# Patient Record
Sex: FEMALE | Race: BLACK OR AFRICAN AMERICAN
[De-identification: names, ages, dates, MRNs, and addresses within clinical notes are randomized per-mention and may not be internally consistent; named-entity substitution may affect disease eponyms.]

---

## 2017-10-05 LAB
ANION GAP SERPL CALC-SCNC: 10 MMOL/L (ref 5–15)
APPEARANCE UR: CLEAR
APTT BLD: 31 SEC (ref 23–33)
B-HCG SERPL QL: NEGATIVE
BACTERIA #/AREA URNS HPF: (no result) /HPF
BASOPHILS NFR BLD AUTO: 1.3 % (ref 0–2)
CALCIUM SERPL-MCNC: 9.6 MG/DL (ref 8.6–10.2)
CHLORIDE SERPL-SCNC: 101 MEQ/L (ref 98–107)
CO2 SERPL-SCNC: 27 MEQ/L (ref 20–30)
CREAT SERPL-MCNC: 0.8 MG/DL (ref 0.5–0.9)
EOSINOPHIL NFR BLD AUTO: 1.5 % (ref 0–3)
ERYTHROCYTE [DISTWIDTH] IN BLOOD BY AUTOMATED COUNT: 13.3 % (ref 11.6–14.8)
GFR SERPLBLD BASED ON 1.73 SQ M-ARVRAT: > 60 ML/MIN (ref 60–?)
HEMOLYSIS: 1
INR PPP: 1.1 (ref 0.9–1.1)
KETONES UR QL STRIP: NEGATIVE
LEUKOCYTE ESTERASE UR QL STRIP: NEGATIVE
LYMPHOCYTES NFR BLD AUTO: 31.1 % (ref 20–45)
MCH RBC QN AUTO: 31 PG (ref 27–31)
MCHC RBC AUTO-ENTMCNC: 32.7 G/DL (ref 32–36)
MCV RBC AUTO: 95 FL (ref 80–99)
MONOCYTES NFR BLD AUTO: 6.2 % (ref 1–10)
NEUTROPHILS NFR BLD AUTO: 60 % (ref 45–75)
NITRITE UR QL STRIP: NEGATIVE
PH UR STRIP: 6.5 [PH] (ref 4.5–8)
PLATELET # BLD: 251 K/UL (ref 150–450)
PMV BLD AUTO: 9.4 FL (ref 6.5–10.1)
POTASSIUM SERPL-SCNC: 4 MEQ/L (ref 3.4–4.9)
PROT UR QL STRIP: NEGATIVE
PROTHROMBIN TIME: 11.6 SEC (ref 9.3–11.5)
RBC # BLD AUTO: 4.09 M/UL (ref 4.2–5.4)
RBC #/AREA URNS HPF: (no result) /HPF (ref 0–2)
SODIUM SERPL-SCNC: 138 MEQ/L (ref 135–145)
SP GR UR STRIP: 1 (ref 1–1.03)
SQUAMOUS #/AREA URNS LPF: (no result) /LPF
UROBILINOGEN UR-MCNC: NORMAL MG/DL (ref 0–1)
WBC # BLD AUTO: 11.2 K/UL (ref 4.8–10.8)
WBC #/AREA URNS HPF: (no result) /HPF (ref 0–2)

## 2017-10-05 NOTE — DIAGNOSTIC IMAGING REPORT
Indication: Cough



Comparison:  None



2 views of the chest obtained.



Findings: Cardiomediastinal silhouette and pulmonary vascularity are within normal

limits for age. The diaphragmatic contour is smooth and costophrenic angles are

sharp. No pleural effusions are identified. The bones are unremarkable.



Impression: No acute disease

## 2017-10-11 NOTE — PRE-OP HX & PHY REPO 2 SIG
DATE OF ADMISSION:  10/12/2017



SCHEDULED FOR SURGERY:  10/12/2017



HISTORY OF PRESENT ILLNESS:  The patient is a 32-year-old 

female in overall good health with a recently noted right breast mass,

biopsy-proven invasive ductal carcinoma.  Her mother has a history of

breast cancer.  Core needle biopsies of the mass in the right upper breast

revealed both invasive ductal carcinoma and ductal carcinoma in situ.  A

mass at 3 o'clock in the right breast needle biopsy revealed sclerosing

adenosis and axillary lymph node needle biopsy was negative for metastatic

carcinoma.  The patient is scheduled to undergo right breast partial

mastectomy and right axillary lymph node biopsy.



PAST MEDICAL HISTORY:  None.



MEDICATIONS:  None.



ALLERGIES:  None.



OPERATIONS:   x2.  She is para 2,  2.



PHYSICAL EXAMINATION:

GENERAL:  The patient is well developed and well nourished.

HEENT:  Within normal limits.

LUNGS:  Clear.

HEART:  Regular rhythm.

BREASTS:  Examination of the breasts reveals left breast is unremarkable.

In the right breast, there is a 4 x 5 cm palpable mass in the right upper

breast between 11 and 2 o'clock at the site of the invasive carcinoma and

DCIS.  There is nodularity at 3 o'clock in the medial breast, which is

benign on needle biopsy.  There is no palpable axillary or supraclavicular

lymphadenopathy.

ABDOMEN:  Negative by primary care physician recently.

PELVIC:  Negative by primary care physician recently.

RECTAL:  Negative by primary care physician recently.

EXTREMITIES:  Without edema.

NEUROLOGIC:  Physiologic.



IMPRESSION:  Invasive ductal carcinoma of the right breast with associated

ductal carcinoma in situ.



PLAN:  The patient had receptor studies revealing that her HER-2/ros status

is negative.  I have had a full discussion with the patient regarding the

nature of her condition, the medical and surgical treatment options and

the planned surgery including right breast partial mastectomy with right

axillary lymph node biopsy.  I have discussed the risks including

bleeding, infection, need for additional surgery based on final pathology,

possible need for chemotherapy if lymph nodes are positive and need

for radiation to the remaining breast.  I have also discussed

postoperative changes that can occur, including scarring and alteration of

the contour or appearance of the breast, etc.  The patient understands and

agrees to proceed.









  ______________________________________________

  Sumit Herrera M.D.





DR:  DS/PM

D:  10/11/2017 10:44

T:  10/11/2017 13:08

JOB#:  [id number]

CC:



KOFFI

## 2017-10-12 ENCOUNTER — HOSPITAL ENCOUNTER (INPATIENT)
Dept: HOSPITAL 72 - SUR | Age: 32
LOS: 1 days | Discharge: HOME | DRG: 363 | End: 2017-10-13
Payer: MEDICAID

## 2017-10-12 VITALS — DIASTOLIC BLOOD PRESSURE: 93 MMHG | SYSTOLIC BLOOD PRESSURE: 136 MMHG

## 2017-10-12 VITALS — SYSTOLIC BLOOD PRESSURE: 136 MMHG | DIASTOLIC BLOOD PRESSURE: 94 MMHG

## 2017-10-12 VITALS — SYSTOLIC BLOOD PRESSURE: 139 MMHG | DIASTOLIC BLOOD PRESSURE: 85 MMHG

## 2017-10-12 VITALS — HEIGHT: 67 IN | BODY MASS INDEX: 31.39 KG/M2 | WEIGHT: 200 LBS

## 2017-10-12 VITALS — DIASTOLIC BLOOD PRESSURE: 84 MMHG | SYSTOLIC BLOOD PRESSURE: 134 MMHG

## 2017-10-12 VITALS — SYSTOLIC BLOOD PRESSURE: 148 MMHG | DIASTOLIC BLOOD PRESSURE: 67 MMHG

## 2017-10-12 VITALS — SYSTOLIC BLOOD PRESSURE: 142 MMHG | DIASTOLIC BLOOD PRESSURE: 88 MMHG

## 2017-10-12 VITALS — SYSTOLIC BLOOD PRESSURE: 116 MMHG | DIASTOLIC BLOOD PRESSURE: 86 MMHG

## 2017-10-12 DIAGNOSIS — D05.11: Primary | ICD-10-CM

## 2017-10-12 DIAGNOSIS — Z80.3: ICD-10-CM

## 2017-10-12 DIAGNOSIS — Z23: ICD-10-CM

## 2017-10-12 PROCEDURE — 85610 PROTHROMBIN TIME: CPT

## 2017-10-12 PROCEDURE — 71020: CPT

## 2017-10-12 PROCEDURE — 94003 VENT MGMT INPAT SUBQ DAY: CPT

## 2017-10-12 PROCEDURE — 84703 CHORIONIC GONADOTROPIN ASSAY: CPT

## 2017-10-12 PROCEDURE — 0HBT0ZZ EXCISION OF RIGHT BREAST, OPEN APPROACH: ICD-10-PCS

## 2017-10-12 PROCEDURE — 85730 THROMBOPLASTIN TIME PARTIAL: CPT

## 2017-10-12 PROCEDURE — 07B50ZX EXCISION OF RIGHT AXILLARY LYMPHATIC, OPEN APPROACH, DIAGNOSTIC: ICD-10-PCS

## 2017-10-12 PROCEDURE — 80048 BASIC METABOLIC PNL TOTAL CA: CPT

## 2017-10-12 PROCEDURE — 81003 URINALYSIS AUTO W/O SCOPE: CPT

## 2017-10-12 PROCEDURE — 81025 URINE PREGNANCY TEST: CPT

## 2017-10-12 PROCEDURE — 90630: CPT

## 2017-10-12 PROCEDURE — 36415 COLL VENOUS BLD VENIPUNCTURE: CPT

## 2017-10-12 PROCEDURE — 85025 COMPLETE CBC W/AUTO DIFF WBC: CPT

## 2017-10-12 PROCEDURE — 94150 VITAL CAPACITY TEST: CPT

## 2017-10-12 RX ADMIN — DIPHENHYDRAMINE HYDROCHLORIDE PRN MG: 50 INJECTION INTRAMUSCULAR; INTRAVENOUS at 18:50

## 2017-10-12 NOTE — IMMEDIATE POST-OP EVALUATION
Immediate Post-Op Evalulation


Immediate Post-Op Evalulation


Procedure:  R breast partial mastectomy and axillary l/n dissection


Date of Evaluation:  Oct 12, 2017


Time of Evaluation:  14:35


IV Fluids:  1200


Blood Products:  none


Estimated Blood Loss:  100


Urinary Output:  none


Blood Pressure Systolic:  136


Blood Pressure Diastolic:  87


Pulse Rate:  89


Respiratory Rate:  22


O2 Sat by Pulse Oximetry:  99


Temperature (Fahrenheit):  97.8


Pain Score (1-10):  2


Nausea:  No


Vomiting:  No


Complications


none


Patient Status:  reacts, patent, extubated, none


Hydration Status:  adequate











OPAL WALTON M.D. Oct 12, 2017 14:36

## 2017-10-12 NOTE — ANETHESIA PREOPERATIVE EVAL
Anesthesia Pre-op PMH/ROS


General


Date of Evaluation:  Oct 12, 2017


Time of Evaluation:  11:59


Anesthesiologist:  Misty


ASA Score:  ASA 2


Mallampati Score


Class I : Soft palate, uvula, fauces, pillars visible


Class II: Soft palate, uvula, fauces visible


Class III: Soft palate, base of uvula visible


Class IV: Only hard plate visible


Mallampati Classification:  Class II


Surgeon:  Sharon


Diagnosis:  R breast CA


Surgical Procedure:  R breast partial mastectomy and axillary l/n dissection


Anesthesia History:  none


Family History:  no anesthesia problems


Allergies:  


Coded Allergies:  


     No Known Allergies (Unverified , 10/12/17)


Medications:  see eMAR





Past Medical History


Cardiovascular:  Denies: HTN, CAD, MI, valve dz, arrhythmia, other


Pulmonary:  Denies: asthma, COPD, DOMING[place], other


Gastrointestinal/Genitourinary:  Reports: GERD - mild, 


   Denies: CRI, ESRD, other


Neurologic/Psychiatric:  Denies: dementia, CVA, depression/anxiety, TIA, other


Endocrine:  Denies: DM, hypothyroidism, steroids, other


HEENT:  Denies: cataract (L), cataract (R), glaucoma, [place] (L), [place] (R), other


Hematology/Immune:  Denies: anemia, DVT, bleeding disorder, other


Musculoskeletal/Integumentary:  Denies: OA, RA, DJD, DDD, edema, other


Other:  other - overweight


PMH Narrative:


as above


PSxH Narrative:


C section x 3





Anesthesia Pre-op Phys. Exam


Physician Exam





Last Vital Signs








  Date Time  Temp Pulse Resp B/P (MAP) Pulse Ox O2 Delivery O2 Flow Rate FiO2


 


10/12/17 10:20 97.7 71 17 116/86 98 Room Air  








Constitutional:  NAD


Neurologic:  CN 2-12 intact


Cardiovascular:  RRR, no M/R/G


Respiratory:  CTA


Gastrointestinal:  S/NT/ND





Airway Exam


Mallampati Score:  Class II


MO:  full


Neck:  flexible


ROM:  full


Teeth:  intact


Dentures:  no upper, no lower





Anesthesia Pre-op A/P


Labs


see chart


Urine Pregnancy Test











Test


  10/12/17


09:55


 


Urine HCG, Qualitative Negative  











Risk Assessment & Plan


Assessment:


ASA 2


Plan:


GA with ETT ponv prevention


Status Change Before Surgery:  No





Pre-Antibiotics


Drug:  Ancef 2 gr.


Given Within 1 Hr of Incision:  Yes


Time Given:  12:28











OPAL WALTON M.D. Oct 12, 2017 12:55

## 2017-10-12 NOTE — BRIEF OPERATIVE NOTE
Immediate Post Operative Note


Operative Note


Pre-op Diagnosis:


invasive ductal carcinoma right breast


Procedure:


right breast partial mastectomy and right axillary lymph node biopsy


Post-op Diagnosis:


same


Post-op Diagnosis:  same as pre-op


Findings:  consistent w/pre-op dx studies


Surgeon:  venice


Anesthesiologist:  rolf


Anesthesia:  general


Specimen:  yes - breast mass and enlarged axillary nodes


Complications:  none


Condition:  stable


Fluids:  see anesthesia record


Drains:  MARINA


Implant(s) used?:  No











DAVION YOU Oct 12, 2017 14:30

## 2017-10-12 NOTE — OPERATIVE NOTE - DICTATED
DATE OF OPERATION:  10/12/2017



SURGEON:  Sumit Herrera M.D.



ASSISTANT:  None.



ANESTHESIOLOGIST:  Fabian Jay M.D.



TYPE OF ANESTHESIA:  General by LMA.



PREOPERATIVE DIAGNOSIS:  Infiltrating ductal carcinoma of the right breast



POSTOPERATIVE DIAGNOSIS:  Infiltrating ductal carcinoma of the right breast



OPERATION PERFORMED:  Right breast partial mastectomy with right axillary

lymph node biopsy.



DESCRIPTION OF PROCEDURE:  The patient was taken to the operating room and

under general anesthesia with sequential compression device stockings in

place and having received intravenous antibiotics, she was prepped and

draped in the usual fashion including the right upper extremity in the field

covered by an impervious stockinette.  The mass was located in the upper

midline of the breast.  A transverse curvilinear incision was made

achieving hemostasis with cautery.  Circumferential flaps were dissected.

Tissue was dissected down to the chest wall and included the pectoralis

fascia as the deep margin.  A wide excision partial mastectomy was

performed with the specimen oriented with 3 different suture markers

anterior, superior, and medial.  The specimen was taken off the field and

inspected by pathology who felt that the margins were clear.  The field

was irrigated and hemostasis was achieved with cautery.  The incision was

closed with interrupted inverted 3-0 Vicryl subcutaneous deep dermal

sutures followed by continuous 4-0 Monocryl subcuticular suture.  Then,

attention was directed to the right axilla where a transverse curvilinear

right axillary incision was made achieving hemostasis with cautery and

incising in the clavipectoral fascia.  Initial dissection failed to reveal

any suspicious lymph nodes but there were 2 very large Anaya's nodes,

which were resected with the axillary dissection.  Hemostasis was achieved

with cautery, medium Hemoclips, and 2-0 Vicryl ties as appropriate.  The

specimen was given off the field for pathology.  The field was copiously

irrigated and hemostasis was secure.  The axillary vein, thoracodorsal

vessels and nerves were preserved.  Through a separate stab incision

inferior, a 19 mm round Gume-Nelson was placed into the axilla and

sutured to the skin with 2-0 silk.  After ascertaining that hemostasis was

secure, the clavipectoral fascia was closed with interrupted 3-0 Vicryl,

subcutaneous tissues closed with interrupted 3-0 Vicryl, and skin closed

with continuous 4-0 Monocryl subcuticular suture.  Tincture of benzoin and

half-inch Steri-Strips were applied to both sites followed by dry sterile

dressings.  Final sponge and needle counts were correct.  A post-breast

surgery vest was applied over the dressing.  The patient tolerated the

procedure well and left the operating room in good condition.









  ______________________________________________

  Sumit Herrera M.D.





DR:  LEONARD

D:  10/12/2017 15:18

T:  10/12/2017 21:41

JOB#:  [id number]

CC:



KOFFI

## 2017-10-12 NOTE — PRE-PROCEDURE NOTE/ATTESTATION
Pre-Procedure Note/Attestation


Complete Prior to Procedure


Planned Procedure:  right


Procedure Narrative:


right breast partial mastectomy and right axillary lymph node biopsy





Indications for Procedure


Pre-Operative Diagnosis:


invasive ductal carcinoma right breast





Attestation


I attest that I discussed the nature of the procedure; its benefits; risks and 

complications; and alternatives (and the risks and benefits of such alternatives

), prior to the procedure, with the patient (or the patient's legal 

representative).





I attest that, if there was a reasonable possibility of needing a blood 

transfusion, the patient (or the patient's legal representative) was given the 

[facility] standardized written summary, pursuant 

to the Florin Thoreau Blood Safety Act ([place] Health and Safety Code # 1645, as 

amended).





I attest that I re-evaluated the patient just prior to the surgery and that 

there has been no change in the patient's H&P, except as documented below:none











DAVION YOU Oct 12, 2017 10:59

## 2017-10-13 VITALS — DIASTOLIC BLOOD PRESSURE: 78 MMHG | SYSTOLIC BLOOD PRESSURE: 130 MMHG

## 2017-10-13 VITALS — DIASTOLIC BLOOD PRESSURE: 83 MMHG | SYSTOLIC BLOOD PRESSURE: 138 MMHG

## 2017-10-13 VITALS — SYSTOLIC BLOOD PRESSURE: 116 MMHG | DIASTOLIC BLOOD PRESSURE: 75 MMHG

## 2017-10-13 VITALS — SYSTOLIC BLOOD PRESSURE: 126 MMHG | DIASTOLIC BLOOD PRESSURE: 83 MMHG

## 2017-10-13 RX ADMIN — DIPHENHYDRAMINE HYDROCHLORIDE PRN MG: 50 INJECTION INTRAMUSCULAR; INTRAVENOUS at 05:38

## 2017-10-13 RX ADMIN — DIPHENHYDRAMINE HYDROCHLORIDE PRN MG: 50 INJECTION INTRAMUSCULAR; INTRAVENOUS at 10:31

## 2017-10-13 NOTE — 48 HOUR POST ANESTHESIA EVAL
Post Anesthesia Evaluation


Procedure:  R breast partial mastectomy and axillary l/n dissection


Date of Evaluation:  Oct 13, 2017


Time of Evaluation:  16:04


Blood Pressure Systolic:  116


0:  75


Pulse Rate:  68


Respiratory Rate:  20


Temperature (Fahrenheit):  97.6


O2 Sat by Pulse Oximetry:  98


Airway:  patent


Nausea:  No


Vomiting:  No


Pain Intensity:  2


Hydration Status:  adequate


Cardiopulmonary Status:


stable


Mental Status/LOC:  patient returned to baseline


Follow-up Care/Observations:


n/a


Post-Anesthesia Complications:


none


Follow-up care needed:  ready to discharge











OPAL WALTON M.D. Oct 13, 2017 16:05

## 2017-10-13 NOTE — GENERAL PROGRESS NOTE
Progress Note


Progress Note


AVSS  Required Dilaudid SQ overnight for pain relief. Better this AM.  Emotional

/crying at times


Breast and axillary incisions are clean with intact steristrips and no 

ecchymosis or swelling


MARINA 25cc serosang


Imp. doing well


Plan; discharge 


         teach re care of MARINA drain


        instructions/limitations discussed and supplies provided


        Rx Norco 5/325 #20


        F/U in [facility] on 10/20 - call DAVION Marshall Oct 13, 2017 09:09

## 2017-10-17 NOTE — DISCHARGE SUMMARY
Discharge Summary


Hospital Course


Date of Admission


Oct 12, 2017 at 15:21


Date of Discharge


Oct 13, 2017 at 11:30


Admitting Diagnosis





Invasive ductal carcinoma of the right breast with associated ductal carcinoma 

in situ.


RENETTA Varela is a 32 year old female who was admitted on Oct 12, 2017 at 15:

21 for invasive ductal carcinoma of the right breast with associated


ductal carcinoma in situ.Patient was admitted for surgery


Procedures


s/p 10/12/17 by dr Herrera


 


Right breast partial mastectomy with right axillary lymph node biopsy.


Hospital Course


s/p surgery


pain management, 


breast and axillary incisions  clean with intact steri-strips , no ecchymosis 

or swelling 


MARINA drain output closely monitored 


patient was taught care of MARINA drain  


pain addressed and controlled  


ambulated, tolerated diet, voided 


instructions/limitations/supplies proved


scripts for analgesics provided 


fup with surgeon as outpatient as advised


 

















FINAL DIAGNOSIS


Invasive ductal carcinoma of the right breast with associated ductal carcinoma 

in situ.


s/p Right breast partial mastectomy with right axillary lymph node biopsy.





Discharge Medications


Continued Medications:  


Hydrocodone/Acetaminophen 5-325* (Hydrocodone/Acetaminophen 5-325*) 1 Each 

Tablet


1 TAB ORAL Q4H PRN for For Pain, #20 TAB 0 Refills











Discharge


Condition Upon Discharge:  stable


Discharge Disposition


Patient was discharged to Home (01)


Discharge Diagnoses:  





Discharge Instructions


Discharge Instructions


Special Instructions


I have been assigned to complete a D/C Summary on this account. I was not 

involved in the patient management











Perla Bright NP (Vanchtein) Oct 17, 2017 12:50

## 2017-12-24 ENCOUNTER — HOSPITAL ENCOUNTER (EMERGENCY)
Dept: HOSPITAL 72 - EMR | Age: 32
Discharge: HOME | End: 2017-12-24
Payer: MEDICAID

## 2017-12-24 VITALS — DIASTOLIC BLOOD PRESSURE: 59 MMHG | SYSTOLIC BLOOD PRESSURE: 104 MMHG

## 2017-12-24 VITALS — BODY MASS INDEX: 29.82 KG/M2 | HEIGHT: 67 IN | WEIGHT: 190 LBS

## 2017-12-24 VITALS — SYSTOLIC BLOOD PRESSURE: 104 MMHG | DIASTOLIC BLOOD PRESSURE: 59 MMHG

## 2017-12-24 VITALS — DIASTOLIC BLOOD PRESSURE: 80 MMHG | SYSTOLIC BLOOD PRESSURE: 114 MMHG

## 2017-12-24 DIAGNOSIS — Z79.899: ICD-10-CM

## 2017-12-24 DIAGNOSIS — J04.0: ICD-10-CM

## 2017-12-24 DIAGNOSIS — C50.919: ICD-10-CM

## 2017-12-24 DIAGNOSIS — J02.9: Primary | ICD-10-CM

## 2017-12-24 DIAGNOSIS — E87.6: ICD-10-CM

## 2017-12-24 DIAGNOSIS — R11.10: ICD-10-CM

## 2017-12-24 LAB
ANION GAP SERPL CALC-SCNC: 11 MMOL/L (ref 5–15)
BASOPHILS NFR BLD AUTO: 1 % (ref 0–2)
CALCIUM SERPL-MCNC: 8.4 MG/DL (ref 8.5–10.1)
CHLORIDE SERPL-SCNC: 99 MMOL/L (ref 98–107)
CO2 SERPL-SCNC: 25 MMOL/L (ref 21–32)
CREAT SERPL-MCNC: 0.8 MG/DL (ref 0.55–1.3)
EOSINOPHIL NFR BLD AUTO: 1.4 % (ref 0–3)
ERYTHROCYTE [DISTWIDTH] IN BLOOD BY AUTOMATED COUNT: 13 % (ref 11.6–14.8)
GFR SERPLBLD BASED ON 1.73 SQ M-ARVRAT: > 60 ML/MIN (ref 60–?)
LYMPHOCYTES NFR BLD AUTO: 24 % (ref 20–45)
MCH RBC QN AUTO: 28.9 PG (ref 27–31)
MCHC RBC AUTO-ENTMCNC: 32.5 G/DL (ref 32–36)
MCV RBC AUTO: 89 FL (ref 80–99)
MONOCYTES NFR BLD AUTO: 6.4 % (ref 1–10)
NEUTROPHILS NFR BLD AUTO: 67.3 % (ref 45–75)
PLATELET # BLD: 184 K/UL (ref 150–450)
PMV BLD AUTO: 9.6 FL (ref 6.5–10.1)
POTASSIUM SERPL-SCNC: 2.9 MMOL/L (ref 3.5–5.1)
RBC # BLD AUTO: 3.97 M/UL (ref 4.2–5.4)
SODIUM SERPL-SCNC: 135 MMOL/L (ref 136–145)
WBC # BLD AUTO: 5.8 K/UL (ref 4.8–10.8)

## 2017-12-24 PROCEDURE — 80048 BASIC METABOLIC PNL TOTAL CA: CPT

## 2017-12-24 PROCEDURE — 99284 EMERGENCY DEPT VISIT MOD MDM: CPT

## 2017-12-24 PROCEDURE — 71010: CPT

## 2017-12-24 PROCEDURE — 36415 COLL VENOUS BLD VENIPUNCTURE: CPT

## 2017-12-24 PROCEDURE — 96361 HYDRATE IV INFUSION ADD-ON: CPT

## 2017-12-24 PROCEDURE — 85025 COMPLETE CBC W/AUTO DIFF WBC: CPT

## 2017-12-24 PROCEDURE — 96365 THER/PROPH/DIAG IV INF INIT: CPT

## 2017-12-24 NOTE — DIAGNOSTIC IMAGING REPORT
Indication: Dyspnea

 

Technique: XRAY Chest 1v

 

Comparison: PA and lateral views of the chest 10/5/2017

 

Findings: Heart size and mediastinal contours are within normal limits. Interval

placement of left chest wall yessica catheter, catheter tip in the region of the

cavoatrial junction. There is no focal consolidation, pneumothorax or pleural

effusion. Osseous structures demonstrate no acute abnormality. Surgical clips noted

in the right axilla. Abdominal shield in place.

 

Impression: No radiographic evidence of acute cardiopulmonary disease.

## 2017-12-24 NOTE — EMERGENCY ROOM REPORT
History of Present Illness


General


Chief Complaint:  Upper Respiratory Illness


Source:  Patient





Present Illness


HPI


Patient present with complaints of sore throat and feeling dehydrated


Patient vomited several times over the course of the night patient last had 

chemotherapy for breast cancer last Tuesday





Patient feels Congested





Had mild cough as well


Sore throat is 3/10 patient also feels that she has lost her voice and feels a 

scratchy feeling





Denies any posterior neck pain denies any photophobia


Allergies:  


Coded Allergies:  


     No Known Allergies (Unverified , 10/12/17)





Patient History


Past Medical History:  see triage record


Pertinent Family History:  none


Reviewed Nursing Documentation:  PMH: Agreed, PSxH: Agreed





Nursing Documentation-PMH


Hx Cardiac Problems:  No


Hx Pacemaker:  No


Hx Asthma:  No


Hx COPD:  No


Hx Diabetes:  No


Hx Cancer:  Yes - breast ca on chemo


Hx Gastrointestinal Problems:  No


Hx Dialysis:  No


Hx Neurological Problems:  No


Hx Cerebrovascular Accident:  No


Hx Seizures:  No





Review of Systems


All Other Systems:  negative except mentioned in HPI





Physical Exam





Vital Signs








  Date Time  Temp Pulse Resp B/P (MAP) Pulse Ox O2 Delivery O2 Flow Rate FiO2


 


12/24/17 09:54 98.1 86 16 130/8 9 Room Air  








Sp02 EP Interpretation:  reviewed, normal


General Appearance:  well appearing, no apparent distress


Head:  normocephalic, atraumatic


Eyes:  bilateral eye PERRL, bilateral eye EOMI


ENT:  hearing grossly normal, TMs + canals normal, uvula midline, pharyngeal 

erythema


Neck:  full range of motion, supple, no meningismus, no bony tend


Respiratory:  lungs clear, normal breath sounds, no rhonchi, no respiratory 

distress, no retraction, no accessory muscle use


Cardiovascular #1:  normal peripheral pulses, regular rate, rhythm, no edema, 

no gallop, no JVD, no murmur, other - Port-A-Cath in place left upper chest


Gastrointestinal:  normal bowel sounds, non tender, soft, no mass, no 

organomegaly, non-distended, no guarding, no hernia, no pulsatile mass, no 

rebound


Genitourinary:  no CVA tenderness


Musculoskeletal:  normal inspection


Neurologic:  oriented x3, responsive, CNs III-XII nml as tested, motor strength/

tone normal, sensory intact


Psychiatric:  mood/affect normal


Skin:  normal color, no rash, warm/dry, palpation normal


Lymphatic:  normal inspection, no adenopathy





Medical Decision Making


Diagnostic Impression:  


 Primary Impression:  


 Pharyngitis


 Additional Impressions:  


 Laryngitis


 Hypokalemia


 Chemotherapy-induced vomiting


ER Course


Multiple differentials considered


Patient has complex requiring blood work and imaging





Chest x-ray was negative patient's potassium level was low which was replaced 

patient was provided with hydration feel significantly better





There is likely a component of chemotherapy reaction as well however given the 

patient's resolution and improvement we will have initial conservative 

outpatient trial





Labs








Test


  12/24/17


10:53


 


White Blood Count


  5.8 K/UL


(4.8-10.8)


 


Red Blood Count


  3.97 M/UL


(4.20-5.40)


 


Hemoglobin


  11.5 G/DL


(12.0-16.0)


 


Hematocrit


  35.4 %


(37.0-47.0)


 


Mean Corpuscular Volume 89 FL (80-99) 


 


Mean Corpuscular Hemoglobin


  28.9 PG


(27.0-31.0)


 


Mean Corpuscular Hemoglobin


Concent 32.5 G/DL


(32.0-36.0)


 


Red Cell Distribution Width


  13.0 %


(11.6-14.8)


 


Platelet Count


  184 K/UL


(150-450)


 


Mean Platelet Volume


  9.6 FL


(6.5-10.1)


 


Neutrophils (%) (Auto)


  67.3 %


(45.0-75.0)


 


Lymphocytes (%) (Auto)


  24.0 %


(20.0-45.0)


 


Monocytes (%) (Auto)


  6.4 %


(1.0-10.0)


 


Eosinophils (%) (Auto)


  1.4 %


(0.0-3.0)


 


Basophils (%) (Auto)


  1.0 %


(0.0-2.0)


 


Sodium Level


  135 MMOL/L


(136-145)


 


Potassium Level


  2.9 MMOL/L


(3.5-5.1)


 


Chloride Level


  99 MMOL/L


()


 


Carbon Dioxide Level


  25 MMOL/L


(21-32)


 


Anion Gap


  11 mmol/L


(5-15)


 


Blood Urea Nitrogen 7 mg/dL (7-18) 


 


Creatinine


  0.8 MG/DL


(0.55-1.30)


 


Estimat Glomerular Filtration


Rate > 60 mL/min


(>60)


 


Glucose Level


  101 MG/DL


()


 


Calcium Level


  8.4 MG/DL


(8.5-10.1)








Rhythm Strip Diag. Results


EP Interpretation:  yes


Rate:  66


Rhythm:  NSR, no PVC's, no ectopy





Chest X-Ray Diagnostic Results


Chest X-Ray Diagnostic Results :  


   Chest X-Ray Ordered:  Yes


   # of Views/Limited/Complete:  1 View


   Indication:  Shortness of Breath


   EP Interpretation:  Yes


   Interpretation:  no consolidation, no effusion, no pneumothorax, no acute 

cardiopulmonary disease


   Impression:  No acute disease


   Electronically Signed by:  Yesy Adams DO





Last Vital Signs








  Date Time  Temp Pulse Resp B/P (MAP) Pulse Ox O2 Delivery O2 Flow Rate FiO2


 


12/24/17 09:54 98.1 86 16 130/8 9 Room Air  








Status:  improved


Disposition:  HOME, SELF-CARE


Condition:  Improved


Scripts


Acetaminophen With Codeine (T#3) (TYLENOL #3 TAB*) Y Tab


1 TAB ORAL Q8H Y for For Pain, #10 TAB


   Prov: YESY ADAMS D.O.         12/24/17 


Prednisone* (PREDNISONE*) 20 Mg Tablet


20 MG ORAL BID, #6 TAB


   Prov: YESY ADAMS D.O.         12/24/17 


Amoxicillin* (AMOXIL*) 500 Mg Capsule


500 MG ORAL THREE TIMES A DAY, #21 CAP


   Prov: YESY ADAMS D.O.         12/24/17





Additional Instructions:  


Patient is provided with the discharge instructions notified to follow up with 

primary doctor in the next 2-3 days otherwise return to the er with any 

worsening symptoms.


Please note that this report is being documented using DRAGON technology.  This 

can lead to erroneous entry secondary to incorrect interpretation by the 

dictating instrument.











YESY ADAMS D.O. Dec 24, 2017 10:19

## 2018-01-19 ENCOUNTER — HOSPITAL ENCOUNTER (INPATIENT)
Dept: HOSPITAL 72 - EMR | Age: 33
LOS: 13 days | Discharge: HOME HEALTH | DRG: 711 | End: 2018-02-01
Payer: MEDICAID

## 2018-01-19 VITALS — SYSTOLIC BLOOD PRESSURE: 133 MMHG | DIASTOLIC BLOOD PRESSURE: 83 MMHG

## 2018-01-19 VITALS — SYSTOLIC BLOOD PRESSURE: 121 MMHG | DIASTOLIC BLOOD PRESSURE: 81 MMHG

## 2018-01-19 VITALS — SYSTOLIC BLOOD PRESSURE: 118 MMHG | DIASTOLIC BLOOD PRESSURE: 86 MMHG

## 2018-01-19 VITALS — DIASTOLIC BLOOD PRESSURE: 78 MMHG | SYSTOLIC BLOOD PRESSURE: 118 MMHG

## 2018-01-19 VITALS — HEIGHT: 67 IN | WEIGHT: 200 LBS | BODY MASS INDEX: 31.39 KG/M2

## 2018-01-19 VITALS — DIASTOLIC BLOOD PRESSURE: 65 MMHG | SYSTOLIC BLOOD PRESSURE: 113 MMHG

## 2018-01-19 DIAGNOSIS — E87.6: ICD-10-CM
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
## 2018-01-19 DIAGNOSIS — R94.5: ICD-10-CM

## 2018-01-19 DIAGNOSIS — I82.C12: ICD-10-CM

## 2018-01-19 DIAGNOSIS — I82.B12: ICD-10-CM

## 2018-01-19 DIAGNOSIS — T80.211A: Primary | ICD-10-CM

## 2018-01-19 DIAGNOSIS — N39.0: ICD-10-CM

## 2018-01-19 DIAGNOSIS — T45.1X5A: ICD-10-CM

## 2018-01-19 DIAGNOSIS — D61.810: ICD-10-CM

## 2018-01-19 DIAGNOSIS — R11.2: ICD-10-CM

## 2018-01-19 DIAGNOSIS — T82.868A: ICD-10-CM

## 2018-01-19 DIAGNOSIS — D64.81: ICD-10-CM

## 2018-01-19 DIAGNOSIS — E86.0: ICD-10-CM

## 2018-01-19 DIAGNOSIS — E87.1: ICD-10-CM

## 2018-01-19 DIAGNOSIS — A41.01: ICD-10-CM

## 2018-01-19 DIAGNOSIS — R19.7: ICD-10-CM

## 2018-01-19 DIAGNOSIS — C50.911: ICD-10-CM

## 2018-01-19 DIAGNOSIS — D50.9: ICD-10-CM

## 2018-01-19 DIAGNOSIS — L27.0: ICD-10-CM

## 2018-01-19 LAB
ADD MANUAL DIFF: NO
ALBUMIN SERPL-MCNC: 3.6 G/DL (ref 3.4–5)
ALBUMIN/GLOB SERPL: 0.8 {RATIO} (ref 1–2.7)
ALP SERPL-CCNC: 192 U/L (ref 46–116)
ALT SERPL-CCNC: 71 U/L (ref 12–78)
ANION GAP SERPL CALC-SCNC: 13 MMOL/L (ref 5–15)
APPEARANCE UR: (no result)
APTT PPP: YELLOW S
AST SERPL-CCNC: 116 U/L (ref 15–37)
BILIRUB SERPL-MCNC: 0.9 MG/DL (ref 0.2–1)
BUN SERPL-MCNC: 8 MG/DL (ref 7–18)
CALCIUM SERPL-MCNC: 9.4 MG/DL (ref 8.5–10.1)
CHLORIDE SERPL-SCNC: 93 MMOL/L (ref 98–107)
CO2 SERPL-SCNC: 23 MMOL/L (ref 21–32)
CREAT SERPL-MCNC: 1.1 MG/DL (ref 0.55–1.3)
ERYTHROCYTE [DISTWIDTH] IN BLOOD BY AUTOMATED COUNT: 14.2 % (ref 11.6–14.8)
GLOBULIN SER-MCNC: 4.8 G/DL
GLUCOSE UR STRIP-MCNC: NEGATIVE MG/DL
HCT VFR BLD CALC: 34.4 % (ref 37–47)
HGB BLD-MCNC: 11.6 G/DL (ref 12–16)
KETONES UR QL STRIP: (no result)
LEUKOCYTE ESTERASE UR QL STRIP: (no result)
MCV RBC AUTO: 87 FL (ref 80–99)
NITRITE UR QL STRIP: NEGATIVE
PH UR STRIP: 5 [PH] (ref 4.5–8)
PLATELET # BLD: 141 K/UL (ref 150–450)
POTASSIUM SERPL-SCNC: 3.3 MMOL/L (ref 3.5–5.1)
PROT UR QL STRIP: (no result)
RBC # BLD AUTO: 3.96 M/UL (ref 4.2–5.4)
SODIUM SERPL-SCNC: 129 MMOL/L (ref 136–145)
SP GR UR STRIP: 1.02 (ref 1–1.03)
UROBILINOGEN UR-MCNC: 8 MG/DL (ref 0–1)
WBC # BLD AUTO: 18.9 K/UL (ref 4.8–10.8)

## 2018-01-19 PROCEDURE — 82607 VITAMIN B-12: CPT

## 2018-01-19 PROCEDURE — 87070 CULTURE OTHR SPECIMN AEROBIC: CPT

## 2018-01-19 PROCEDURE — 86710 INFLUENZA VIRUS ANTIBODY: CPT

## 2018-01-19 PROCEDURE — 87040 BLOOD CULTURE FOR BACTERIA: CPT

## 2018-01-19 PROCEDURE — 82150 ASSAY OF AMYLASE: CPT

## 2018-01-19 PROCEDURE — 76700 US EXAM ABDOM COMPLETE: CPT

## 2018-01-19 PROCEDURE — 83540 ASSAY OF IRON: CPT

## 2018-01-19 PROCEDURE — 87086 URINE CULTURE/COLONY COUNT: CPT

## 2018-01-19 PROCEDURE — 74177 CT ABD & PELVIS W/CONTRAST: CPT

## 2018-01-19 PROCEDURE — 81025 URINE PREGNANCY TEST: CPT

## 2018-01-19 PROCEDURE — 80307 DRUG TEST PRSMV CHEM ANLYZR: CPT

## 2018-01-19 PROCEDURE — 93306 TTE W/DOPPLER COMPLETE: CPT

## 2018-01-19 PROCEDURE — 84100 ASSAY OF PHOSPHORUS: CPT

## 2018-01-19 PROCEDURE — 87045 FECES CULTURE AEROBIC BACT: CPT

## 2018-01-19 PROCEDURE — 85610 PROTHROMBIN TIME: CPT

## 2018-01-19 PROCEDURE — 80202 ASSAY OF VANCOMYCIN: CPT

## 2018-01-19 PROCEDURE — 36569 INSJ PICC 5 YR+ W/O IMAGING: CPT

## 2018-01-19 PROCEDURE — 93971 EXTREMITY STUDY: CPT

## 2018-01-19 PROCEDURE — 87205 SMEAR GRAM STAIN: CPT

## 2018-01-19 PROCEDURE — 94003 VENT MGMT INPAT SUBQ DAY: CPT

## 2018-01-19 PROCEDURE — 71260 CT THORAX DX C+: CPT

## 2018-01-19 PROCEDURE — 71045 X-RAY EXAM CHEST 1 VIEW: CPT

## 2018-01-19 PROCEDURE — 85025 COMPLETE CBC W/AUTO DIFF WBC: CPT

## 2018-01-19 PROCEDURE — 80053 COMPREHEN METABOLIC PANEL: CPT

## 2018-01-19 PROCEDURE — 83735 ASSAY OF MAGNESIUM: CPT

## 2018-01-19 PROCEDURE — 36415 COLL VENOUS BLD VENIPUNCTURE: CPT

## 2018-01-19 PROCEDURE — 86140 C-REACTIVE PROTEIN: CPT

## 2018-01-19 PROCEDURE — 82746 ASSAY OF FOLIC ACID SERUM: CPT

## 2018-01-19 PROCEDURE — 82728 ASSAY OF FERRITIN: CPT

## 2018-01-19 PROCEDURE — 94150 VITAL CAPACITY TEST: CPT

## 2018-01-19 PROCEDURE — 78306 BONE IMAGING WHOLE BODY: CPT

## 2018-01-19 PROCEDURE — 87181 SC STD AGAR DILUTION PER AGT: CPT

## 2018-01-19 PROCEDURE — 81003 URINALYSIS AUTO W/O SCOPE: CPT

## 2018-01-19 PROCEDURE — 83550 IRON BINDING TEST: CPT

## 2018-01-19 PROCEDURE — 80048 BASIC METABOLIC PNL TOTAL CA: CPT

## 2018-01-19 PROCEDURE — 76937 US GUIDE VASCULAR ACCESS: CPT

## 2018-01-19 PROCEDURE — 99285 EMERGENCY DEPT VISIT HI MDM: CPT

## 2018-01-19 PROCEDURE — 82550 ASSAY OF CK (CPK): CPT

## 2018-01-19 PROCEDURE — 85007 BL SMEAR W/DIFF WBC COUNT: CPT

## 2018-01-19 PROCEDURE — 83690 ASSAY OF LIPASE: CPT

## 2018-01-19 PROCEDURE — 85730 THROMBOPLASTIN TIME PARTIAL: CPT

## 2018-01-19 RX ADMIN — DEXTROSE MONOHYDRATE SCH MLS/HR: 50 INJECTION, SOLUTION INTRAVENOUS at 14:21

## 2018-01-19 RX ADMIN — MORPHINE SULFATE PRN MG: 2 INJECTION, SOLUTION INTRAMUSCULAR; INTRAVENOUS at 07:02

## 2018-01-19 RX ADMIN — MORPHINE SULFATE PRN MG: 2 INJECTION, SOLUTION INTRAMUSCULAR; INTRAVENOUS at 19:12

## 2018-01-19 RX ADMIN — HEPARIN SODIUM SCH UNITS: 5000 INJECTION INTRAVENOUS; SUBCUTANEOUS at 21:00

## 2018-01-19 RX ADMIN — DEXTROSE AND SODIUM CHLORIDE SCH MLS/HR: 5; .45 INJECTION, SOLUTION INTRAVENOUS at 07:03

## 2018-01-19 RX ADMIN — PANTOPRAZOLE SODIUM SCH MG: 40 INJECTION, POWDER, FOR SOLUTION INTRAVENOUS at 09:16

## 2018-01-19 RX ADMIN — DEXTROSE MONOHYDRATE SCH MLS/HR: 50 INJECTION, SOLUTION INTRAVENOUS at 22:02

## 2018-01-19 RX ADMIN — DEXTROSE AND SODIUM CHLORIDE SCH MLS/HR: 5; .45 INJECTION, SOLUTION INTRAVENOUS at 19:11

## 2018-01-19 RX ADMIN — HEPARIN SODIUM SCH UNITS: 5000 INJECTION INTRAVENOUS; SUBCUTANEOUS at 09:00

## 2018-01-19 NOTE — GI INITIAL CONSULT NOTE
History of Present Illness


General


Date patient seen:  Jan 19, 2018


Time patient seen:  10:49


Reason for Hospitalization:  Generalized Weakness


Referring physician:  SYBIL VELA


Reason for Consultation:  NAUSEA





Present Illness


HPI


Patient presents with complaints of vomiting bodyache


Cough and shortness of breath


Patient had chemotherapy last week


And has a symptoms over the past few days patient had similar episode last 

month or she had again similar dehydration and pain reaction after chemotherapy


Denies any chest pain


Denies any recent travel pain is 6/10 diffuse describes as being in her bones


GI consulted for N/V.   HPI noted above.  Pt seen on floor, awake A&Ox4 NAD c/o 

of nausea with multiple episodes of vomiting x3 days.  Denies hematemesis/

coffee grounds.  Noted no nausea with no active vomiting at the moment.  

Patient coughing up alot of saliva, normal in color.  Had minimal relief from 

zofran which was given twice in the ED.  Presents today with leukocytosis and 

abnormal LFTs.  No history of endoscopy / colonoscopies.


Home Meds


Active Scripts


Acetaminophen With Codeine (T#3) (TYLENOL #3 TAB*) Y Tab, 1 TAB ORAL Q8H Y for 

For Pain, #10 TAB


   Prov:OTF ADAMS D.O.         12/24/17


Prednisone* (PREDNISONE*) 20 Mg Tablet, 20 MG ORAL BID, #6 TAB


   Prov:OTF ADAMS D.O.         12/24/17


Amoxicillin* (AMOXIL*) 500 Mg Capsule, 500 MG ORAL THREE TIMES A DAY, #21 CAP


   Prov:OTF ADAMS D.O.         12/24/17


Reported Medications


Hydrocodone Bit/Acetaminophen 7.5-325* (NORCO 7.5-325*) 1 Each Tablet, 1 TAB 

ORAL Q4H Y for For Pain, #30 TAB 0 Refills


   1/19/18


Amoxicillin* (AMOXIL*) 500 Mg Capsule, 500 MG ORAL BID, CAP


   1/19/18


Hydrocodone/Acetaminophen 5-325* (HYDROCODONE/ACETAMINOPHEN 5-325*) 1 Each 

Tablet, 1 TAB ORAL Q4H Y for For Pain, #20 TAB 0 Refills


   10/13/17


No Known Medications* (NKM - No Known Medications*)  ., 0 ., 0 Refills


   10/12/17


Med list reviewed/reconciled:  Yes


Allergies:  


Coded Allergies:  


     No Known Allergies (Unverified , 10/12/17)





Patient History


History Provided By:  Patient, Medical Record


PMH Narrative


Nursing Documentation-PMH


Hx Cardiac Problems:  No


Hx Pacemaker:  No


Hx Asthma:  No


Hx COPD:  No


Hx Diabetes:  No


Hx Cancer:  Yes - R- breast ca on chemo


Hx Gastrointestinal Problems:  No


Hx Dialysis:  No


Hx Neurological Problems:  No


Hx Cerebrovascular Accident:  No


Hx Seizures:  No


Social History:  Denies: smoking, alcohol use, drug use, other





Review of Systems


All Other Systems:  negative except mentioned in HPI





Physical Exam





Vital Signs








  Date Time  Temp Pulse Resp B/P (MAP) Pulse Ox O2 Delivery O2 Flow Rate FiO2


 


1/19/18 03:59 98.8 137 16 113/65 95 Room Air  








Sp02 EP Interpretation:  reviewed, normal


Labs





Laboratory Tests








Test


  1/19/18


04:40


 


White Blood Count


  18.9 K/UL


(4.8-10.8)  H


 


Red Blood Count


  3.96 M/UL


(4.20-5.40)  L


 


Hemoglobin


  11.6 G/DL


(12.0-16.0)  L


 


Hematocrit


  34.4 %


(37.0-47.0)  L


 


Mean Corpuscular Volume 87 FL (80-99)  


 


Mean Corpuscular Hemoglobin


  29.3 PG


(27.0-31.0)


 


Mean Corpuscular Hemoglobin


Concent 33.8 G/DL


(32.0-36.0)


 


Red Cell Distribution Width


  14.2 %


(11.6-14.8)


 


Platelet Count


  141 K/UL


(150-450)  L


 


Mean Platelet Volume


  7.8 FL


(6.5-10.1)


 


Neutrophils (%) (Auto)


  % (45.0-75.0)


 


 


Lymphocytes (%) (Auto)


  % (20.0-45.0)


 


 


Monocytes (%) (Auto)  % (1.0-10.0)  


 


Eosinophils (%) (Auto)  % (0.0-3.0)  


 


Basophils (%) (Auto)  % (0.0-2.0)  


 


Urine Color Yellow  


 


Urine Appearance Cloudy  


 


Urine pH 5 (4.5-8.0)  


 


Urine Specific Gravity


  1.025


(1.005-1.035)


 


Urine Protein


  4+ (NEGATIVE)


H


 


Urine Glucose (UA)


  Negative


(NEGATIVE)


 


Urine Ketones


  4+ (NEGATIVE)


H


 


Urine Occult Blood


  3+ (NEGATIVE)


H


 


Urine Nitrite


  Negative


(NEGATIVE)


 


Urine Bilirubin


  2+ (NEGATIVE)


H


 


Urine Ictotest Positive  


 


Urine Urobilinogen


  8 MG/DL


(0.0-1.0)  H


 


Urine Leukocyte Esterase


  1+ (NEGATIVE)


H


 


Urine RBC


  10-15 /HPF (0


- 2)  H


 


Urine WBC


  5-10 /HPF (0 -


2)  H


 


Urine Squamous Epithelial


Cells Moderate /LPF


(NONE/OCC)  H


 


Urine Amorphous Sediment


  Many /LPF


(NONE)  H


 


Urine Bacteria


  Moderate /HPF


(NONE)  H


 


Urine Coarse Granular Casts


  2-4 /LPF


(NONE)  H


 


Sodium Level


  129 MMOL/L


(136-145)  L


 


Potassium Level


  3.3 MMOL/L


(3.5-5.1)  L


 


Chloride Level


  93 MMOL/L


()  L


 


Carbon Dioxide Level


  23 MMOL/L


(21-32)


 


Anion Gap


  13 mmol/L


(5-15)


 


Blood Urea Nitrogen


  8 mg/dL (7-18)


 


 


Creatinine


  1.1 MG/DL


(0.55-1.30)


 


Estimat Glomerular Filtration


Rate > 60 mL/min


(>60)


 


Glucose Level


  130 MG/DL


()  H


 


Calcium Level


  9.4 MG/DL


(8.5-10.1)


 


Total Bilirubin


  0.9 MG/DL


(0.2-1.0)


 


Aspartate Amino Transf


(AST/SGOT) 116 U/L


(15-37)  H


 


Alanine Aminotransferase


(ALT/SGPT) 71 U/L (12-78)


 


 


Alkaline Phosphatase


  192 U/L


()  H


 


Total Protein


  8.4 G/DL


(6.4-8.2)  H


 


Albumin


  3.6 G/DL


(3.4-5.0)


 


Globulin 4.8 g/dL  


 


Albumin/Globulin Ratio


  0.8 (1.0-2.7)


L


 


Lipase


  225 U/L


()








General Appearance:  well appearing, no apparent distress, alert, obese


Head:  normocephalic


EENT:  PERRL/EOMI, normal ENT inspection


Neck:  supple


Respiratory:  normal breath sounds, no respiratory distress


Cardiovascular:  normal rate


Gastrointestinal:  normal inspection, non tender, soft, normal bowel sounds, non

-distended


Rectal:  deferred


Genitourinary:  no CVA tenderness


Musculoskeletal:  normal inspection, back normal


Neurologic:  normal inspection, alert, oriented x3, responsive


Psychiatric:  normal inspection, judgement/insight normal, memory normal


Skin:  normal inspection, normal color, no rash, warm/dry, palpation normal, 

well hydrated


Lymphatic:  normal inspection, no adenopathy


Current Medications





Current Medications








 Medications


  (Trade)  Dose


 Ordered  Sig/Indira


 Route


 PRN Reason  Start Time


 Stop Time Status Last Admin


Dose Admin


 


 Acetaminophen


  (Tylenol)  650 mg  Q4H  PRN


 ORAL


 fever  1/19/18 05:30


 2/18/18 05:29   


 


 


 Acetaminophen/


 Hydrocodone Bitart


  (Norco 5/325)  1 tab  Q4H  PRN


 ORAL


 Moderate Pain (Pain Scale 4-6)  1/19/18 05:30


 1/26/18 05:29   


 


 


 Al Hydroxide/Mg


 Hydroxide


  (Mylanta II)  30 ml  Q6H  PRN


 ORAL


 dyspepsia  1/19/18 05:30


 2/18/18 05:29   


 


 


 Dextrose


  (Dextrose 50%)    STAT  PRN


 IV


 Hypoglycemia  1/19/18 05:30


 2/18/18 05:29   


 


 


 Dextrose/Sodium


 Chloride  1,000 ml @ 


 75 mls/hr  [id number]


 IV


   1/19/18 05:19


 2/18/18 05:18  1/19/18 07:03


 


 


 Diphenhydramine


 HCl


  (Benadryl)  25 mg  Q6H  PRN


 ORAL


 Itching/Pruritis  1/19/18 05:30


 2/18/18 05:29   


 


 


 Heparin Sodium


  (Porcine)


  (Heparin 5000


 units/ml)  5,000 units  EVERY 12  HOURS


 SUBQ


   1/19/18 09:00


 2/18/18 08:59   


 


 


 Lorazepam


  (Ativan 2mg/ml


 1ml)  1 mg  Q4H  PRN


 IV


 agitation  1/19/18 05:30


 1/26/18 05:29   


 


 


 Morphine Sulfate


  (Morphine


 Sulfate)  2 mg  Q4H  PRN


 IVP


 severe  Pain (Pain Scale 7-10)  1/19/18 05:30


 1/26/18 05:29  1/19/18 07:02


 


 


 Nitroglycerin


  (Ntg)  0.4 mg  Q5M X 3 DOSES PRN


 SL


 Prn Chest Pain  1/19/18 05:30


 2/18/18 05:29   


 


 


 Ondansetron HCl


  (Zofran)  4 mg  Q6H  PRN


 IVP


 Nausea & Vomiting  1/19/18 05:30


 2/18/18 05:29   


 


 


 Pantoprazole


  (Protonix)  40 mg  DAILY


 IV


   1/19/18 09:00


 2/18/18 08:59  1/19/18 09:16


 


 


 Piperacillin Sod/


 Tazobactam Sod


 3.375 gm/Sodium


 Chloride  110 ml @ 


 27.5 mls/hr  Q8HR


 IVPB


   1/19/18 14:00


 1/26/18 13:59   


 


 


 Polyethylene


 Glycol


  (Miralax)  17 gm  HSPRN  PRN


 ORAL


 Constipation  1/19/18 05:30


 2/18/18 05:29   


 


 


 Prednisone


  (predniSONE)  20 mg  BID


 ORAL


   1/19/18 09:00


 2/18/18 08:59  1/19/18 09:16


 


 


 Promethazine HCl


  (Phenergan)  25 mg  Q8H  PRN


 IV


 refractory nausea  1/19/18 05:30


 2/18/18 05:29   


 


 


 Promethazine HCl


  (Phenergan)  25 mg  TID  PRN


 ORAL


 Nausea & Vomiting  1/19/18 10:45


 2/18/18 10:44 UNV  


 


 


 Temazepam


  (Restoril)  15 mg  HSPRN  PRN


 ORAL


 Insomnia  1/19/18 05:30


 1/26/18 05:29   


 











GI: Plan


Problems:  


(1) Chemotherapy induced nausea and vomiting


(2) Dehydration


(3) Electrolyte imbalance


(4) Abnormal LFTs


Plan


symptomatic treatment


trial CLD, adv as tolerated


zofran prn, will add Phenergan for the nausea


anemia work up


OB stool r/o GI bleed


monitor H&H, prn transfusions


PO/IV hydration and electrolyte correction


bowel regime


ppi


fu utox, abdominal U/S


fu labs





Discussed with Dr. Marie.


Thank you for this patient referral, we will follow.











Maggi Montanez N.P. Jan 19, 2018 10:49

## 2018-01-19 NOTE — CONSULTATION
DATE OF CONSULTATION:  2018



INFECTIOUS DISEASE CONSULTATION



CONSULTING PHYSICIAN:  José Mcgrath M.D



REFERRING PHYSICIAN:  Fara Bolanos M.D.



REASON FOR CONSULTATION:  Evaluation of the patient for fever, possible

sepsis, antibiotic management.



HISTORY OF PRESENT ILLNESS:  The patient is a 32-year-old female with past

medical history significant for right breast cancer, status post

lumpectomy, who is getting chemo.  The patient came to the hospital with

nausea and vomiting, and was found to be febrile.  Infectious Disease

consultation has been requested for further evaluation of the patient's

antibiotic management.



PAST MEDICAL HISTORY:  Significant for,



1. Right breast mastectomy, status post lumpectomy.

2. History of  and tubal ligation.

3. Status post Port-A-Cath placement.



ALLERGIES:  No known drug allergies.



SOCIAL HISTORY:  Ex-smoker.



FAMILY HISTORY:  Not contributing.



REVIEW OF SYSTEMS:  HEENT:  No recent change in vision or hearing.

PULMONARY:  No cough or shortness of breath.    CARDIOVASCULAR:  No chest

pain or palpitation.  GASTROINTESTINAL/ABDOMEN:  Nausea, vomiting, and

diarrhea.    GENITOURINARY:  No dysuria.  MUSCULOSKELETAL:  Pain all over

extremities.   NEUROLOGY:  No history of seizure.



PHYSICAL EXAMINATION:

VITAL SIGNS:  Temperature 101, blood pressure 133/83, pulse 100, and

respiratory rate 18.

HEENT:  No pale conjunctivae.  No icterus.

NECK:  No lymphadenopathy.

CHEST:  Clear.

HEART:  S1 and S2.

ABDOMEN:  Soft and nontender.

EXTREMITIES:  No cyanosis at this time.

NEUROLOGIC:  Awake and alert.



LABORATORY AND DIAGNOSTIC DATA:  White blood cells 18.9, hemoglobin 11, and

platelets 141.  UA, 5 to 10 white blood cells and 10 to 15 red blood

cells.  BUN 8 and creatinine 1.1.  , ALT 71, and alkaline

phosphatase 192.  Chest x-ray, no infiltrates.



ASSESSMENT:  The patient is a 32-year-old female with,



1. Nausea, vomiting/diarrhea (probably due to chemo, viral, less likely

bacterial).

2. Rule out bacteremia and line infection.

3. Leukocytosis.

4. Fever.

5. Rule out flu.

6. Abnormal liver function tests, rule out biliary disease.



PLAN:

1. We will continue the patient on Zosyn.

2. Blood culture.

3. Urine culture.

4. Ultrasound of the abdomen.

5. Flu screen.

6. Flu culture.

7. Monitor CBC and BMP.

8. Based on the patient's clinical course and labs, we will do further

recommendation.



Thank you, Dr. Bolanos for allowing me to participate in the care of

this patient.  I will follow the patient with you during this

hospitalization.









  ______________________________________________

  José Mcgrath M.D.





DR:  MAGNO

D:  2018 11:59

T:  2018 20:09

JOB#:  [id number]

CC:

## 2018-01-19 NOTE — HISTORY AND PHYSICAL REPORT
DATE OF ADMISSION:  01/19/2018



CHIEF COMPLAINT:  Nausea, vomiting, and unable to tolerate p.o. intake.



HISTORY OF PRESENT ILLNESS:  This is a 32-year-old very unfortunate 

American female with a past medical history significant for right breast

cancer stage II, status post lumpectomy in 10/2017.  Currently on

chemotherapy 3 more sessions left, presented to emergency room complaining

about the vomiting nonbilious and non-bloody reported some cough

associated with shortness of breath.  The patient had a chemotherapy last

Tuesday.  Her symptoms just started yesterday.  It got progressively

worsening.  She denies any chest pain, palpitation and she complained

about diffuse bony pain.  Shortly after initial evaluation in the

emergency, the patient was admitted to the hospital with intractable

nausea, vomiting, possible urinary tract infection and sepsis.



PAST MEDICAL HISTORY/PAST SURGICAL HISTORY:  As above.  History of right

breast cancer, status post lumpectomy, and chemotherapy.



MEDICATIONS:  Medications at home is significant for Tylenol No. 3,

amoxicillin, Norco, and prednisone 20 mg twice a day.



ALLERGIES:  No known drug allergies.



SOCIAL HISTORY:  Denies any smoking, alcohol, or drugs at this

time.



FAMILY HISTORY:  Noncontributory.



REVIEW OF SYSTEMS:  Limited secondary to the patient's status.  Complained

of nausea and vomiting.  Denies any abdominal pain.  Denies any hemoptysis

or hematochezia.  Denies any bright red blood per rectum.



PHYSICAL EXAMINATION:

VITAL SIGNS:  On admission, temperature of 101.1, pulse of 111,

respirations 20, and blood pressure 133/83.

GENERAL:  The patient is awake and responsive, in no acute

distress.

HEAD AND NECK:  Pupils are equal and reactive to light.  Extraocular

movements are intact.

NECK:  Supple.  No JVD.

LUNGS:  Clear.  No wheezing or rales.  The patient has a port on the left

side chest wall was noted.  No signs of infection around it.

HEART:  S1 and S2.  Regular rhythm.  Tachycardic.

ABDOMEN:  Soft, nondistended, and nontender.  Positive bowel

sounds.

EXTREMITIES:  No cyanosis, clubbing, or edema.

NEUROLOGIC:  Cranial nerves II through XII are grossly intact.  Motor is

5/5 in all extremities.



LABORATORY AND DIAGNOSTIC DATA:  Labs on admission, WBC of 18.9, hemoglobin

11, hematocrit 34, and platelet is 141.  Sodium 129, potassium 3.3,

chloride 93, bicarbonate 23, BUN 8, creatinine 1.1, and glucose is 130.

Calcium is 9.4.  AST of 116, ALT of 71, and alkaline phosphatase 192.

Lipase is 225.  Urinalysis many amorphous sedimentation with moderate

urine bacteria, +1 leukocytes, +4 ketones, and +4 urine protein.  The

patient's chest x-ray, no definite focal airspace consolidation and this

corresponds with interpretation as before.



ASSESSMENT:

1. Sepsis likely due to urinary tract infection.

2. Intractable nausea and vomiting.

3. Dehydration.

4. Hyponatremia.

5. Invasive ductal carcinoma, stage II status post lumpectomy.

6. Abnormal liver function.



PLAN:  Admit the patient to med/surg.  We will follow up with the septic

workup.  Broad-spectrum antibiotic was started with Zosyn and pain

medication.  IV hydration.  Clear liquid diet.  Follow up with the ID

consultation with Dr. José Mcgrath and Pulmonary consultation with Dr. Bolanos and gastrointestinal consultation with Dr. Marie.  We will

monitor laboratory in the morning.  Code status is Full Code.  DVT

prophylaxis.  Heparin subcutaneous.









  ______________________________________________

  Dave Christine M.D. DR:  ERIN

D:  01/19/2018 14:57

T:  01/19/2018 23:35

JOB#:  [id number]

CC:

## 2018-01-19 NOTE — DIAGNOSTIC IMAGING REPORT
Indication: Nausea and vomiting, fever, on chemotherapy

 

Technique: US ABD Complete

 

Comparison: None

 

Findings: Imaged portions of the pancreatic head are unremarkable in appearance. Body

and tail are not seen.

 

Imaged portions of the IVC and abdominal aorta are normal in caliber.

 

The liver is enlarged, with the right lobe measuring 20 cm in length. Hepatic

echogenicity is homogeneous. No focal hepatic mass lesion is appreciated

sonographically. The gallbladder is normal in appearance. There is no evidence of

cholelithiasis. Gallbladder wall thickening or pericholecystic fluid. Sonographic

Park sign was reported as negative. There is no intrahepatic biliary ductal

dilatation. Common bile duct measures 4.4 mm in diameter.

 

The right kidney measures 12 cm in length and the left kidney measures 13 cm in

length. Both kidneys demonstrate normal parenchymal echogenicity. No hydronephrosis

or sonographically appreciable renal stones bilaterally.

 

Spleen is enlarged, measuring 13 cm in length.

 

There is no ascites.

 

IMPRESSION:

Hepatosplenomegaly.

 

Otherwise, unremarkable abdominal sonogram.

## 2018-01-19 NOTE — CONSULTATION
History of Present Illness


General


Date patient seen:  Jan 19, 2018


Time patient seen:  09:00


Chief Complaint:  Generalized Weakness


Referring physician:  SYBIL VELA


Reason for Consultation:  inpatient management





Present Illness


HPI


32 y/old female with hx of right breast Ca, s/p lumpectomy  October 2017, 

currently on chemo ( 3 more sessions left) presented





Patient presented with complaints of vomiting, non bloody, nonbilious emesis


reported cough and shortness of breath


Had chemotherapy last week


Similar complaints last month after chemotherapy 


Denied chest pain, palpitations 


Pain described as diffuse, felt in her bones 





VS revealed tachycardia-137, no fever, stable pulse ox and BP


workup in ED revealed leukocytosis-18.9, UA with evidence of UTI


Na-129 K-3.3 


lipase WNL   


ECG-ST


CXR no acute CP pathology





patient was admitted for further management


Allergies:  


Coded Allergies:  


     No Known Allergies (Unverified , 10/12/17)





Medication History


Scheduled


Amoxicillin* (Amoxil*), 500 MG ORAL THREE TIMES A DAY


Amoxicillin* (Amoxil*), 500 MG ORAL BID, (Reported)


No Known Medications* (NKM - No Known Medications*), 0 ., (Reported)


Prednisone* (Prednisone*), 20 MG ORAL BID





Scheduled PRN


Acetaminophen With Codeine (T#3) (Tylenol #3 Tab*), 1 TAB ORAL Q8H PRN for For 

Pain


Hydrocodone Bit/Acetaminophen 7.5-325* (Norco 7.5-325*), 1 TAB ORAL Q4H PRN for 

For Pain, (Reported)


Hydrocodone/Acetaminophen 5-325* (Hydrocodone/Acetaminophen 5-325*), 1 TAB ORAL 

Q4H PRN for For Pain, (Reported)





Patient History


History Provided By:  Patient


Healthcare decision maker





Resuscitation status


Full Code


Advanced Directive on File








Past Medical/Surgical History


Past Medical/Surgical History:  


(1) Breast CA





Review of Systems


Constitutional:  Reports: malaise, weakness


Eye:  Reports: no symptoms


ENT:  Reports: no symptoms


Respiratory:  Reports: no symptoms


Cardiovascular:  Reports: no symptoms


Gastrointestinal:  Reports: see HPI


Genitourinary:  Reports: no symptoms


Musculoskeletal:  Reports: no symptoms


Skin:  Reports: no symptoms


Psychiatric:  Reports: no symptoms


Neurological:  Reports: no symptoms


Endocrine:  Reports: no symptoms


Hematologic/Lymphatic:  Reports: no symptoms





Physical Exam


General Appearance:  no apparent distress


Lines, tubes and drains:  peripheral, other - L chest PAC


HEENT:  normocephalic, atraumatic, anicteric


Neck:  non-tender, supple


Respiratory/Chest:  lungs clear, no respiratory distress, no accessory muscle 

use


Cardiovascular/Chest:  normal rate, regular rhythm, no JVD


Abdomen:  normal bowel sounds, non tender, soft


Extremities:  non-tender, no calf tenderness, normal capillary refill


Skin Exam:  warm/dry


Neurologic:  CNs II-XII grossly normal, no motor/sensory deficits, alert, 

oriented x 3, responsive


Musculoskeletal:  normal muscle bulk





Last 24 Hour Vital Signs








  Date Time  Temp Pulse Resp B/P (MAP) Pulse Ox O2 Delivery O2 Flow Rate FiO2


 


1/19/18 08:40 98.1 68 19 118/78 93   


 


1/19/18 05:52 98.8  16 113/65 95 Room Air  


 


1/19/18 05:52 98.8  16 113/65 95 Room Air  


 


1/19/18 03:59 98.8 137 16 113/65 95 Room Air  

















Intake and Output  


 


 1/18/18 1/19/18





 19:00 07:00


 


Intake Total  0 ml


 


Balance  0 ml


 


  


 


Intake Oral  0 ml











Laboratory Tests








Test


  1/19/18


04:40


 


White Blood Count


  18.9 K/UL


(4.8-10.8)  H


 


Red Blood Count


  3.96 M/UL


(4.20-5.40)  L


 


Hemoglobin


  11.6 G/DL


(12.0-16.0)  L


 


Hematocrit


  34.4 %


(37.0-47.0)  L


 


Mean Corpuscular Volume 87 FL (80-99)  


 


Mean Corpuscular Hemoglobin


  29.3 PG


(27.0-31.0)


 


Mean Corpuscular Hemoglobin


Concent 33.8 G/DL


(32.0-36.0)


 


Red Cell Distribution Width


  14.2 %


(11.6-14.8)


 


Platelet Count


  141 K/UL


(150-450)  L


 


Mean Platelet Volume


  7.8 FL


(6.5-10.1)


 


Neutrophils (%) (Auto)


  % (45.0-75.0)


 


 


Lymphocytes (%) (Auto)


  % (20.0-45.0)


 


 


Monocytes (%) (Auto)  % (1.0-10.0)  


 


Eosinophils (%) (Auto)  % (0.0-3.0)  


 


Basophils (%) (Auto)  % (0.0-2.0)  


 


Urine Color Yellow  


 


Urine Appearance Cloudy  


 


Urine pH 5 (4.5-8.0)  


 


Urine Specific Gravity


  1.025


(1.005-1.035)


 


Urine Protein


  4+ (NEGATIVE)


H


 


Urine Glucose (UA)


  Negative


(NEGATIVE)


 


Urine Ketones


  4+ (NEGATIVE)


H


 


Urine Occult Blood


  3+ (NEGATIVE)


H


 


Urine Nitrite


  Negative


(NEGATIVE)


 


Urine Bilirubin


  2+ (NEGATIVE)


H


 


Urine Ictotest Positive  


 


Urine Urobilinogen


  8 MG/DL


(0.0-1.0)  H


 


Urine Leukocyte Esterase


  1+ (NEGATIVE)


H


 


Urine RBC


  10-15 /HPF (0


- 2)  H


 


Urine WBC


  5-10 /HPF (0 -


2)  H


 


Urine Squamous Epithelial


Cells Moderate /LPF


(NONE/OCC)  H


 


Urine Amorphous Sediment


  Many /LPF


(NONE)  H


 


Urine Bacteria


  Moderate /HPF


(NONE)  H


 


Urine Coarse Granular Casts


  2-4 /LPF


(NONE)  H


 


Sodium Level


  129 MMOL/L


(136-145)  L


 


Potassium Level


  3.3 MMOL/L


(3.5-5.1)  L


 


Chloride Level


  93 MMOL/L


()  L


 


Carbon Dioxide Level


  23 MMOL/L


(21-32)


 


Anion Gap


  13 mmol/L


(5-15)


 


Blood Urea Nitrogen


  8 mg/dL (7-18)


 


 


Creatinine


  1.1 MG/DL


(0.55-1.30)


 


Estimat Glomerular Filtration


Rate > 60 mL/min


(>60)


 


Glucose Level


  130 MG/DL


()  H


 


Calcium Level


  9.4 MG/DL


(8.5-10.1)


 


Total Bilirubin


  0.9 MG/DL


(0.2-1.0)


 


Aspartate Amino Transf


(AST/SGOT) 116 U/L


(15-37)  H


 


Alanine Aminotransferase


(ALT/SGPT) 71 U/L (12-78)


 


 


Alkaline Phosphatase


  192 U/L


()  H


 


Total Protein


  8.4 G/DL


(6.4-8.2)  H


 


Albumin


  3.6 G/DL


(3.4-5.0)


 


Globulin 4.8 g/dL  


 


Albumin/Globulin Ratio


  0.8 (1.0-2.7)


L


 


Lipase


  225 U/L


()








Height (Feet):  5


Height (Inches):  7.00


Weight (Pounds):  200


Medications





Current Medications








 Medications


  (Trade)  Dose


 Ordered  Sig/Indira


 Route


 PRN Reason  Start Time


 Stop Time Status Last Admin


Dose Admin


 


 Acetaminophen


  (Tylenol)  650 mg  Q4H  PRN


 ORAL


 fever  1/19/18 05:30


 2/18/18 05:29   


 


 


 Acetaminophen/


 Hydrocodone Bitart


  (Norco 5/325)  1 tab  Q4H  PRN


 ORAL


 Moderate Pain (Pain Scale 4-6)  1/19/18 05:30


 1/26/18 05:29   


 


 


 Al Hydroxide/Mg


 Hydroxide


  (Mylanta II)  30 ml  Q6H  PRN


 ORAL


 dyspepsia  1/19/18 05:30


 2/18/18 05:29   


 


 


 Dextrose


  (Dextrose 50%)    STAT  PRN


 IV


 Hypoglycemia  1/19/18 05:30


 2/18/18 05:29   


 


 


 Dextrose/Sodium


 Chloride  1,000 ml @ 


 75 mls/hr  [id number]


 IV


   1/19/18 05:19


 2/18/18 05:18  1/19/18 07:03


 


 


 Diphenhydramine


 HCl


  (Benadryl)  25 mg  Q6H  PRN


 ORAL


 Itching/Pruritis  1/19/18 05:30


 2/18/18 05:29   


 


 


 Heparin Sodium


  (Porcine)


  (Heparin 5000


 units/ml)  5,000 units  EVERY 12  HOURS


 SUBQ


   1/19/18 09:00


 2/18/18 08:59   


 


 


 Lorazepam


  (Ativan 2mg/ml


 1ml)  1 mg  Q4H  PRN


 IV


 agitation  1/19/18 05:30


 1/26/18 05:29   


 


 


 Morphine Sulfate


  (Morphine


 Sulfate)  2 mg  Q4H  PRN


 IVP


 severe  Pain (Pain Scale 7-10)  1/19/18 05:30


 1/26/18 05:29  1/19/18 07:02


 


 


 Nitroglycerin


  (Ntg)  0.4 mg  Q5M X 3 DOSES PRN


 SL


 Prn Chest Pain  1/19/18 05:30


 2/18/18 05:29   


 


 


 Ondansetron HCl


  (Zofran)  4 mg  Q6H  PRN


 IVP


 Nausea & Vomiting  1/19/18 05:30


 2/18/18 05:29   


 


 


 Pantoprazole


  (Protonix)  40 mg  DAILY


 IV


   1/19/18 09:00


 2/18/18 08:59  1/19/18 09:16


 


 


 Piperacillin Sod/


 Tazobactam Sod


 3.375 gm/Sodium


 Chloride  110 ml @ 


 27.5 mls/hr  Q8HR


 IVPB


   1/19/18 14:00


 1/26/18 13:59   


 


 


 Polyethylene


 Glycol


  (Miralax)  17 gm  HSPRN  PRN


 ORAL


 Constipation  1/19/18 05:30


 2/18/18 05:29   


 


 


 Prednisone


  (predniSONE)  20 mg  BID


 ORAL


   1/19/18 09:00


 2/18/18 08:59  1/19/18 09:16


 


 


 Promethazine HCl


  (Phenergan)  25 mg  Q8H  PRN


 ORAL


 Nausea & Vomiting  1/19/18 10:45


 2/18/18 10:44   


 


 


 Temazepam


  (Restoril)  15 mg  HSPRN  PRN


 ORAL


 Insomnia  1/19/18 05:30


 1/26/18 05:29   


 











Assessment/Plan


Assessment/Plan


ASSESSMENT


sepsis


probably UTI


chemo induced nausea and vomiting


invasive ductal carcinoma grade III ,  s/p lumpectomy


dehydration 


e/lyte imbalance ( hypo Na, hypo K) 


abnormal LFT 





PLAN OF CARE


MS floor 


empiric abx 


ID consult 


fup with cx


pain management


O2 titrate prn


CT C/A/P


bone scan 


GI follows


 


trial CLD, and advance as tolerated


a/emetic prn


anemia work up


stool OB to  r/o GI bleed


monitor H&H, prn transfusions


push po fluids


correct electrolytes as needed


bowel regime


PPI


urine tox screen 


abdominal U/S


  


case discussed and evaluated by supervising physician











Deangelo ([facility] [place])Perla NP Jan 19, 2018 12:03

## 2018-01-19 NOTE — HISTORY & PHYSICAL
History and Physical


History & Physicial


Job # [id number]











Dave Christine MD Jan 19, 2018 14:58

## 2018-01-19 NOTE — EMERGENCY ROOM REPORT
History of Present Illness


General


Chief Complaint:  Generalized Weakness


Source:  Patient





Present Illness


HPI


Patient presents with complaints of vomiting bodyache


Cough and shortness of breath


Patient had chemotherapy last week


And has a symptoms over the past few days patient had similar episode last 

month or she had again similar dehydration and pain reaction after chemotherapy





Denies any chest pain


Denies any recent travel pain is 6/10 diffuse describes as being in her bones


Allergies:  


Coded Allergies:  


     No Known Allergies (Unverified , 10/12/17)





Patient History


Past Medical History:  see triage record


Pertinent Family History:  none


Last Menstrual Period:  dec 31


Pregnant Now:  No


Reviewed Nursing Documentation:  PMH: Agreed, PSxH: Agreed





Nursing Documentation-PMH


Hx Cardiac Problems:  No


Hx Pacemaker:  No


Hx Asthma:  No


Hx COPD:  No


Hx Diabetes:  No


Hx Cancer:  Yes - R- breast ca on chemo


Hx Gastrointestinal Problems:  No


Hx Dialysis:  No


Hx Neurological Problems:  No


Hx Cerebrovascular Accident:  No


Hx Seizures:  No





Review of Systems


All Other Systems:  negative except mentioned in HPI





Physical Exam





Vital Signs








  Date Time  Temp Pulse Resp B/P (MAP) Pulse Ox O2 Delivery O2 Flow Rate FiO2


 


1/19/18 03:59 98.8 137 16 113/65 95 Room Air  








Sp02 EP Interpretation:  reviewed, normal


General Appearance:  mild distress - appears uncomfortable


Head:  normocephalic, atraumatic


Eyes:  bilateral eye PERRL, bilateral eye EOMI


ENT:  hearing grossly normal, TMs + canals normal, uvula midline, dry mucus 

membranes


Neck:  full range of motion, supple, no meningismus, no bony tend


Respiratory:  lungs clear, normal breath sounds, no rhonchi, no respiratory 

distress, no retraction, no accessory muscle use


Cardiovascular #1:  normal peripheral pulses, regular rate, rhythm, no edema, 

no gallop, no JVD, no murmur


Gastrointestinal:  normal bowel sounds, non tender, soft, no mass, no 

organomegaly, non-distended, no guarding, no hernia, no pulsatile mass, no 

rebound


Musculoskeletal:  normal inspection


Neurologic:  oriented x3, responsive, CNs III-XII nml as tested, motor strength/

tone normal, sensory intact


Psychiatric:  mood/affect normal


Skin:  normal color, no rash, warm/dry, palpation normal


Lymphatic:  normal inspection, no adenopathy





Medical Decision Making


Diagnostic Impression:  


 Primary Impression:  


 UTI (urinary tract infection)


 Additional Impression:  


 Sepsis


ER Course


Patient's complex with multiple differentials considered


At this time patient's blood work and tests reveal significant UTI elevated 

white blood cell count showings concerns of bacteremia and sepsis patient has 

further hydration and antibiotics provided


And will require admission for further care





Labs








Test


  1/19/18


04:40


 


White Blood Count


  18.9 K/UL


(4.8-10.8)


 


Red Blood Count


  3.96 M/UL


(4.20-5.40)


 


Hemoglobin


  11.6 G/DL


(12.0-16.0)


 


Hematocrit


  34.4 %


(37.0-47.0)


 


Mean Corpuscular Volume 87 FL (80-99) 


 


Mean Corpuscular Hemoglobin


  29.3 PG


(27.0-31.0)


 


Mean Corpuscular Hemoglobin


Concent 33.8 G/DL


(32.0-36.0)


 


Red Cell Distribution Width


  14.2 %


(11.6-14.8)


 


Platelet Count


  141 K/UL


(150-450)


 


Mean Platelet Volume


  7.8 FL


(6.5-10.1)


 


Neutrophils (%) (Auto)  % (45.0-75.0) 


 


Lymphocytes (%) (Auto)  % (20.0-45.0) 


 


Monocytes (%) (Auto)  % (1.0-10.0) 


 


Eosinophils (%) (Auto)  % (0.0-3.0) 


 


Basophils (%) (Auto)  % (0.0-2.0) 


 


Urine Color Yellow 


 


Urine Appearance Cloudy 


 


Urine pH 5 (4.5-8.0) 


 


Urine Specific Gravity


  1.025


(1.005-1.035)


 


Urine Protein 4+ (NEGATIVE) 


 


Urine Glucose (UA)


  Negative


(NEGATIVE)


 


Urine Ketones 4+ (NEGATIVE) 


 


Urine Occult Blood 3+ (NEGATIVE) 


 


Urine Nitrite


  Negative


(NEGATIVE)


 


Urine Bilirubin 2+ (NEGATIVE) 


 


Urine Ictotest Positive 


 


Urine Urobilinogen


  8 MG/DL


(0.0-1.0)


 


Urine Leukocyte Esterase 1+ (NEGATIVE) 


 


Urine RBC


  10-15 /HPF (0


- 2)


 


Urine WBC


  5-10 /HPF (0 -


2)


 


Urine Squamous Epithelial


Cells Moderate /LPF


(NONE/OCC)


 


Urine Amorphous Sediment


  Many /LPF


(NONE)


 


Urine Bacteria


  Moderate /HPF


(NONE)


 


Urine Coarse Granular Casts


  2-4 /LPF


(NONE)


 


Sodium Level


  129 MMOL/L


(136-145)


 


Potassium Level


  3.3 MMOL/L


(3.5-5.1)


 


Chloride Level


  93 MMOL/L


()


 


Carbon Dioxide Level


  23 MMOL/L


(21-32)


 


Anion Gap


  13 mmol/L


(5-15)


 


Blood Urea Nitrogen 8 mg/dL (7-18) 


 


Creatinine


  1.1 MG/DL


(0.55-1.30)


 


Estimat Glomerular Filtration


Rate > 60 mL/min


(>60)


 


Glucose Level


  130 MG/DL


()


 


Calcium Level


  9.4 MG/DL


(8.5-10.1)


 


Total Bilirubin


  0.9 MG/DL


(0.2-1.0)


 


Aspartate Amino Transf


(AST/SGOT) 116 U/L


(15-37)


 


Alanine Aminotransferase


(ALT/SGPT) 71 U/L (12-78) 


 


 


Alkaline Phosphatase


  192 U/L


()


 


Total Protein


  8.4 G/DL


(6.4-8.2)


 


Albumin


  3.6 G/DL


(3.4-5.0)


 


Globulin 4.8 g/dL 


 


Albumin/Globulin Ratio 0.8 (1.0-2.7) 


 


Lipase


  225 U/L


()








Rhythm Strip Diag. Results


EP Interpretation:  yes


Rate:  112


Rhythm:  no PVC's, no ectopy, other - sinus tach





Chest X-Ray Diagnostic Results


Chest X-Ray Diagnostic Results :  


   Chest X-Ray Ordered:  Yes


   # of Views/Limited/Complete:  1 View


   Indication:  Chest Pain


   EP Interpretation:  Yes


   Interpretation:  no consolidation, no effusion, no pneumothorax, no acute 

cardiopulmonary disease


   Impression:  No acute disease


   Electronically Signed by:  Yesy Adams DO





Last Vital Signs








  Date Time  Temp Pulse Resp B/P (MAP) Pulse Ox O2 Delivery O2 Flow Rate FiO2


 


1/19/18 03:59 98.8 137 16 113/65 95 Room Air  








Status:  improved


Disposition:  ADMITTED AS INPATIENT


Condition:  Serious


Referrals:  


NOT CHOSEN IPA/MD,REFERRING (PCP)











YESY ADAMS D.O. Jan 19, 2018 04:30

## 2018-01-19 NOTE — DIAGNOSTIC IMAGING REPORT
Indication: Shortness of breath

 

Technique: XRAY Chest 1v

 

Comparison: 12/24/2017

 

Findings: Left chest Mediport unchanged in position with its catheter tip in the

lower SVC. Heart size and mediastinal contours are within normal limits given

technique. There is no focal consolidation, pneumothorax or pleural effusion. Osseous

structures demonstrate no acute abnormality.

 

Impression: No definite focal airspace consolidation.

 

This corresponds with the interpretation by the treating ER physician.

## 2018-01-20 VITALS — DIASTOLIC BLOOD PRESSURE: 85 MMHG | SYSTOLIC BLOOD PRESSURE: 121 MMHG

## 2018-01-20 VITALS — DIASTOLIC BLOOD PRESSURE: 72 MMHG | SYSTOLIC BLOOD PRESSURE: 122 MMHG

## 2018-01-20 VITALS — DIASTOLIC BLOOD PRESSURE: 93 MMHG | SYSTOLIC BLOOD PRESSURE: 131 MMHG

## 2018-01-20 VITALS — SYSTOLIC BLOOD PRESSURE: 108 MMHG | DIASTOLIC BLOOD PRESSURE: 71 MMHG

## 2018-01-20 VITALS — DIASTOLIC BLOOD PRESSURE: 88 MMHG | SYSTOLIC BLOOD PRESSURE: 137 MMHG

## 2018-01-20 VITALS — SYSTOLIC BLOOD PRESSURE: 119 MMHG | DIASTOLIC BLOOD PRESSURE: 81 MMHG

## 2018-01-20 LAB
ADD MANUAL DIFF: YES
ALBUMIN SERPL-MCNC: 3.2 G/DL (ref 3.4–5)
ALBUMIN/GLOB SERPL: 0.7 {RATIO} (ref 1–2.7)
ALP SERPL-CCNC: 187 U/L (ref 46–116)
ALT SERPL-CCNC: 68 U/L (ref 12–78)
AMYLASE SERPL-CCNC: 80 U/L (ref 25–115)
ANION GAP SERPL CALC-SCNC: 11 MMOL/L (ref 5–15)
AST SERPL-CCNC: 55 U/L (ref 15–37)
BILIRUB SERPL-MCNC: 0.4 MG/DL (ref 0.2–1)
BUN SERPL-MCNC: 7 MG/DL (ref 7–18)
CALCIUM SERPL-MCNC: 9.3 MG/DL (ref 8.5–10.1)
CHLORIDE SERPL-SCNC: 103 MMOL/L (ref 98–107)
CO2 SERPL-SCNC: 23 MMOL/L (ref 21–32)
CREAT SERPL-MCNC: 0.7 MG/DL (ref 0.55–1.3)
ERYTHROCYTE [DISTWIDTH] IN BLOOD BY AUTOMATED COUNT: 15.3 % (ref 11.6–14.8)
GLOBULIN SER-MCNC: 4.6 G/DL
HCT VFR BLD CALC: 30.4 % (ref 37–47)
HGB BLD-MCNC: 10.2 G/DL (ref 12–16)
MCV RBC AUTO: 89 FL (ref 80–99)
PLATELET # BLD: 121 K/UL (ref 150–450)
POTASSIUM SERPL-SCNC: 4 MMOL/L (ref 3.5–5.1)
RBC # BLD AUTO: 3.43 M/UL (ref 4.2–5.4)
SODIUM SERPL-SCNC: 137 MMOL/L (ref 136–145)
WBC # BLD AUTO: 21.3 K/UL (ref 4.8–10.8)

## 2018-01-20 RX ADMIN — Medication SCH MLS/HR: at 23:14

## 2018-01-20 RX ADMIN — MORPHINE SULFATE PRN MG: 4 INJECTION INTRAVENOUS at 20:28

## 2018-01-20 RX ADMIN — MORPHINE SULFATE PRN MG: 2 INJECTION, SOLUTION INTRAMUSCULAR; INTRAVENOUS at 11:38

## 2018-01-20 RX ADMIN — HEPARIN SODIUM SCH UNITS: 5000 INJECTION INTRAVENOUS; SUBCUTANEOUS at 09:00

## 2018-01-20 RX ADMIN — MORPHINE SULFATE PRN MG: 2 INJECTION, SOLUTION INTRAMUSCULAR; INTRAVENOUS at 04:29

## 2018-01-20 RX ADMIN — DEXTROSE MONOHYDRATE SCH MLS/HR: 50 INJECTION, SOLUTION INTRAVENOUS at 06:27

## 2018-01-20 RX ADMIN — HEPARIN SODIUM SCH UNITS: 5000 INJECTION INTRAVENOUS; SUBCUTANEOUS at 20:31

## 2018-01-20 RX ADMIN — DEXTROSE AND SODIUM CHLORIDE SCH MLS/HR: 5; .45 INJECTION, SOLUTION INTRAVENOUS at 23:15

## 2018-01-20 RX ADMIN — DEXTROSE AND SODIUM CHLORIDE SCH MLS/HR: 5; .45 INJECTION, SOLUTION INTRAVENOUS at 07:59

## 2018-01-20 RX ADMIN — Medication SCH MLS/HR: at 13:11

## 2018-01-20 RX ADMIN — MORPHINE SULFATE PRN MG: 2 INJECTION, SOLUTION INTRAMUSCULAR; INTRAVENOUS at 15:42

## 2018-01-20 RX ADMIN — PANTOPRAZOLE SODIUM SCH MG: 40 INJECTION, POWDER, FOR SOLUTION INTRAVENOUS at 09:00

## 2018-01-20 NOTE — GENERAL PROGRESS NOTE
Assessment/Plan


Problem List:  


(1) Breast CA


ICD Codes:  C50.919 - Malignant neoplasm of unspecified site of unspecified 

female breast


SNOMED:  483776832


(2) Abnormal LFTs


ICD Codes:  R94.5 - Abnormal results of liver function studies


SNOMED:  991729443


(3) Dehydration


ICD Codes:  E86.0 - Dehydration


SNOMED:  59779705


(4) pain


Assessment/Plan


fu CT


pain control


phenergan and zofran


fu labs





Subjective


ROS Limited/Unobtainable:  Yes


Allergies:  


Coded Allergies:  


     No Known Allergies (Unverified , 10/12/17)


Subjective


feeling better





Objective





Last 24 Hour Vital Signs








  Date Time  Temp Pulse Resp B/P (MAP) Pulse Ox O2 Delivery O2 Flow Rate FiO2


 


1/20/18 04:59 98.2       


 


1/20/18 04:00 98.2 76 18 122/72 100   


 


1/20/18 00:00 98.0 83 19 108/71 100   


 


1/19/18 20:00 97.2 74 18 118/86 100   


 


1/19/18 16:17 98.0 85 20 121/81 98   


 


1/19/18 12:50 98.9       


 


1/19/18 12:45 98.9       


 


1/19/18 11:51 101.1 111 20 133/83 100   


 


1/19/18 08:40 98.1 68 19 118/78 93   

















Intake and Output  


 


 1/19/18 1/20/18





 19:00 07:00


 


Intake Total 240 ml 


 


Balance 240 ml 


 


  


 


Intake Oral 240 ml 


 


# Voids 1 








Laboratory Tests


1/19/18 23:00: 


Urine Opiates Screen PositiveH, Urine Barbiturates Screen Negative, 

Phencyclidine (PCP) Screen Negative, Urine Amphetamines Screen Negative, Urine 

Benzodiazepines Screen Negative, Urine Cocaine Screen Negative, Urine Marijuana 

(THC) Screen PositiveH


1/20/18 06:58: 


White Blood Count [Pending], Red Blood Count [Pending], Hemoglobin [Pending], 

Hematocrit [Pending], Mean Corpuscular Volume [Pending], Mean Corpuscular 

Hemoglobin [Pending], Mean Corpuscular Hemoglobin Concent [Pending], Red Cell 

Distribution Width [Pending], Platelet Count [Pending], Mean Platelet Volume [

Pending], Neutrophils (%) (Auto) [Pending], Lymphocytes (%) (Auto) [Pending], 

Monocytes (%) (Auto) [Pending], Eosinophils (%) (Auto) [Pending], Basophils (%) 

(Auto) [Pending], Activated Partial Thromboplast Time [Pending], Sodium Level [

Pending], Potassium Level [Pending], Chloride Level [Pending], Carbon Dioxide 

Level [Pending], Blood Urea Nitrogen [Pending], Creatinine [Pending], Estimat 

Glomerular Filtration Rate [Pending], Glucose Level [Pending], Calcium Level [

Pending], Total Bilirubin [Pending], Aspartate Amino Transf (AST/SGOT) [Pending]

, Alanine Aminotransferase (ALT/SGPT) [Pending], Alkaline Phosphatase [Pending]

, Total Protein [Pending], Albumin [Pending], Globulin [Pending], Amylase Level 

[Pending], Lipase [Pending]


Height (Feet):  5


Height (Inches):  7.00


Weight (Pounds):  200


General Appearance:  alert


EENT:  normal ENT inspection


Neck:  supple


Cardiovascular:  normal rate


Respiratory/Chest:  decreased breath sounds


Abdomen:  normal bowel sounds, non tender, soft


Extremities:  non-tender











NATALIYA DAWSON Jan 20, 2018 07:49

## 2018-01-20 NOTE — PULMONOLOGY PROGRESS NOTE
Assessment/Plan


Assessment/Plan


ASSESSMENT


sepsis with bacteremia ( GPC) 


chemo induced nausea and vomiting


invasive ductal carcinoma grade III ,  s/p lumpectomy


dehydration 


e/lyte imbalance ( hypo Na, hypo K) 


abnormal LFT 





PLAN OF CARE


[facility] floor 


empiric abx 


ID follwos


blood cx + GPC, sepsis likely due to bacteremai , 


patient has PAC,  leuk trending up;  ? line infection 


urine cx +mixed GPO,


bone scan negative 


CT C/A/P today 


pain management


O2 titrate prn


GI follows 


IVF


Na and K stable , correct electrolytes as needed


tolerated CLD,   advance as tolerated, after CT scan


a/emetic prn


anemia work up


stool OB to  r/o GI bleed


monitor H&H, prn transfusions


push po fluids


bowel regimen


PPI


urine tox screen + marijuana, opiates


abdominal U/S+HSM; AST with trend down 


  


case discussed and evaluated by supervising physician





Subjective


Allergies:  


Coded Allergies:  


     No Known Allergies (Unverified , 10/12/17)


Subjective


pain better controlled, 


leukocytosis with trend up, afebrile





Objective





Last 24 Hour Vital Signs








  Date Time  Temp Pulse Resp B/P (MAP) Pulse Ox O2 Delivery O2 Flow Rate FiO2


 


1/20/18 08:06 98.5 87 18 121/85 100   


 


1/20/18 04:59 98.2       


 


1/20/18 04:00 98.2 76 18 122/72 100   


 


1/20/18 00:00 98.0 83 19 108/71 100   


 


1/19/18 20:00 97.2 74 18 118/86 100   


 


1/19/18 16:17 98.0 85 20 121/81 98   


 


1/19/18 12:50 98.9       


 


1/19/18 12:45 98.9       


 


1/19/18 11:51 101.1 111 20 133/83 100   

















Intake and Output  


 


 1/19/18 1/20/18





 19:00 07:00


 


Intake Total 240 ml 


 


Balance 240 ml 


 


  


 


Intake Oral 240 ml 


 


# Voids 1 1








Objective


General Appearance:  no apparent distress, A.AO x 3 young AA female 


Lines, tubes and drains:  peripheral,  L chest PAC


HEENT:  normocephalic, atraumatic, anicteric


Neck:  non-tender, supple


Respiratory/Chest:  lungs clear, no respiratory distress, no accessory muscle 

use


Cardiovascular/Chest:  normal rate, regular rhythm, no JVD


Abdomen:  normal bowel sounds, non tender, soft


Extremities:  non-tender, no calf tenderness, normal capillary refill


Skin Exam:  warm/dry


Neurologic:  CNs II-XII grossly normal, no motor/sensory deficits, alert, 

oriented x 3, responsive


Musculoskeletal:  normal muscle bulk





Microbiology








 Date/Time


Source Procedure


Growth Status


 


 


 1/19/18 04:40


Blood Blood Culture - Preliminary


Gram Positive Cocci Resulted


 


 1/19/18 04:20


Blood Blood Culture - Preliminary


Gram Positive Cocci Resulted


 


 1/19/18 19:50


Nasopharynx Influenza Types A,B Antigen (PALMER) - Final Complete


 


 1/19/18 04:40


Urine,Clean Catch Urine Culture - Preliminary


Mixed Gram Positive Organism Resulted








Laboratory Tests


1/19/18 23:00: 


Urine Opiates Screen PositiveH, Urine Barbiturates Screen Negative, 

Phencyclidine (PCP) Screen Negative, Urine Amphetamines Screen Negative, Urine 

Benzodiazepines Screen Negative, Urine Cocaine Screen Negative, Urine Marijuana 

(THC) Screen PositiveH


1/20/18 06:58: 


White Blood Count 21.3H, Red Blood Count 3.43L, Hemoglobin 10.2L, Hematocrit 

30.4L, Mean Corpuscular Volume 89, Mean Corpuscular Hemoglobin 29.8, Mean 

Corpuscular Hemoglobin Concent 33.6, Red Cell Distribution Width 15.3H, 

Platelet Count 121L, Mean Platelet Volume 8.5, Neutrophils (%) (Auto) , 

Lymphocytes (%) (Auto) , Monocytes (%) (Auto) , Eosinophils (%) (Auto) , 

Basophils (%) (Auto) , Differential Total Cells Counted 100, Neutrophils % (

Manual) 87H, Lymphocytes % (Manual) 5L, Monocytes % (Manual) 6, Eosinophils % (

Manual) 0, Basophils % (Manual) 0, Band Neutrophils 2, Platelet Estimate 

DecreasedL, Platelet Morphology Normal, Hypochromasia 2+, Anisocytosis 1+, 

Activated Partial Thromboplast Time 32, Sodium Level 137, Potassium Level 4.0, 

Chloride Level 103, Carbon Dioxide Level 23, Anion Gap 11, Blood Urea Nitrogen 7

, Creatinine 0.7, Estimat Glomerular Filtration Rate > 60, Glucose Level 129H, 

Calcium Level 9.3, Total Bilirubin 0.4, Aspartate Amino Transf (AST/SGOT) 55H, 

Alanine Aminotransferase (ALT/SGPT) 68, Alkaline Phosphatase 187H, Total 

Protein 7.8, Albumin 3.2L, Globulin 4.6, Albumin/Globulin Ratio 0.7L, Amylase 

Level 80, Lipase 328





Current Medications








 Medications


  (Trade)  Dose


 Ordered  Sig/Indira


 Route


 PRN Reason  Start Time


 Stop Time Status Last Admin


Dose Admin


 


 Acetaminophen


  (Tylenol)  650 mg  Q4H  PRN


 ORAL


 fever  1/19/18 05:30


 2/18/18 05:29  1/19/18 11:46


 


 


 Acetaminophen/


 Hydrocodone Bitart


  (Norco 5/325)  1 tab  Q4H  PRN


 ORAL


 Moderate Pain (Pain Scale 4-6)  1/19/18 05:30


 1/26/18 05:29   


 


 


 Al Hydroxide/Mg


 Hydroxide


  (Mylanta II)  30 ml  Q6H  PRN


 ORAL


 dyspepsia  1/19/18 05:30


 2/18/18 05:29   


 


 


 Dextrose


  (Dextrose 50%)    STAT  PRN


 IV


 Hypoglycemia  1/19/18 05:30


 2/18/18 05:29   


 


 


 Dextrose/Sodium


 Chloride  1,000 ml @ 


 75 mls/hr  [id number]


 IV


   1/19/18 05:19


 2/18/18 05:18  1/19/18 19:11


 


 


 Diphenhydramine


 HCl


  (Benadryl)  25 mg  Q6H  PRN


 ORAL


 Itching/Pruritis  1/19/18 05:30


 2/18/18 05:29   


 


 


 Heparin Sodium


  (Porcine)


  (Heparin 5000


 units/ml)  5,000 units  EVERY 12  HOURS


 SUBQ


   1/19/18 09:00


 2/18/18 08:59   


 


 


 Lorazepam


  (Ativan 2mg/ml


 1ml)  1 mg  Q4H  PRN


 IV


 agitation  1/19/18 05:30


 1/26/18 05:29   


 


 


 Morphine Sulfate


  (Morphine


 Sulfate)  2 mg  Q4H  PRN


 IVP


 severe  Pain (Pain Scale 7-10)  1/19/18 05:30


 1/26/18 05:29  1/20/18 04:29


 


 


 Nitroglycerin


  (Ntg)  0.4 mg  Q5M X 3 DOSES PRN


 SL


 Prn Chest Pain  1/19/18 05:30


 2/18/18 05:29   


 


 


 Ondansetron HCl


  (Zofran)  4 mg  Q6H  PRN


 IVP


 Nausea & Vomiting  1/19/18 05:30


 2/18/18 05:29  1/19/18 19:12


 


 


 Pantoprazole


  (Protonix)  40 mg  DAILY


 IV


   1/19/18 09:00


 2/18/18 08:59  1/19/18 09:16


 


 


 Piperacillin Sod/


 Tazobactam Sod


 3.375 gm/Sodium


 Chloride  110 ml @ 


 27.5 mls/hr  Q8HR


 IVPB


   1/19/18 14:00


 1/26/18 13:59  1/20/18 06:27


 


 


 Polyethylene


 Glycol


  (Miralax)  17 gm  HSPRN  PRN


 ORAL


 Constipation  1/19/18 05:30


 2/18/18 05:29   


 


 


 Prednisone


  (predniSONE)  20 mg  BID


 ORAL


   1/19/18 09:00


 2/18/18 08:59  1/19/18 19:13


 


 


 Promethazine HCl


  (Phenergan)  25 mg  Q8H  PRN


 ORAL


 Nausea & Vomiting  1/19/18 10:45


 2/18/18 10:44   


 


 


 Temazepam


  (Restoril)  15 mg  HSPRN  PRN


 ORAL


 Insomnia  1/19/18 05:30


 1/26/18 05:29  1/19/18 23:25


 


 


 Vancomycin HCl


  (Vanco rx to


 dose)  1 ea  DAILY  PRN


 MISC


 Per rx protocol  1/20/18 09:15


 2/19/18 09:14   


 


 


 Vancomycin HCl 2


 gm/Dextrose  550 ml @ 


 275 mls/hr  ONCE  ONCE


 IVPB


   1/20/18 11:00


 1/20/18 12:59   


 


 


 Vancomycin HCl/


 Dextrose  250 ml @ 


 125 mls/hr  Q12HR@1100,2300


 IVPB


   1/20/18 23:00


 1/25/18 22:59   


 

















Deangelo (Dioni)Perla NP Jan 20, 2018 11:12

## 2018-01-20 NOTE — INFECTIOUS DISEASES PROG NOTE
Assessment/Plan


Assessment/Plan








ASSESSMENT:  The patient is a 32-year-old female with,





1. Nausea, vomiting/diarrhea (probably due to chemo vs related to sepsis)


   -Abd US: Hepatosplenomegaly.Otherwise, unremarkable abdominal sonogram.


2. Sepsis 2ry to GPC bacteremia- Rule out line infection.


     - BCx 2/4 GPC (ID and sensi pending)


3. Leukocytosis, worsening


4. Fever, improving


    -u/a wbc 5/10


    -Influenza neg


    -CXR no focal consolidation





5.Elevated LFTs, improving





- Right breast mastectomy, status post lumpectomy.


-. History of  and tubal ligation.


-. Status post Port-A-Cath placement.








PLAN:


1. We will continue the patient on Zosyn #2 for the next 24-48hrs pending 

cultures and add IV Vancomycin for GPC bacteremia


    - SP Ceftriaxone x1


2. Repeat 2 sets of Bcx (one peripheral, one from [facility] cath)


3.. 2d Echo to evaluate for vegetation


4.. f/u cx


5.. Monitor CBC and CMP; trend LFTs and WBC


6. Based on the patient's clinical course and labs, we will do further


recommendation.





Thank you, Dr. Bolanos for allowing me to participate in the care of


this patient.  I will follow the patient with you during this


hospitalization.





Subjective


Allergies:  


Coded Allergies:  


     No Known Allergies (Unverified , 10/12/17)


Subjective


afebrile in almost 24hrs


bacteremic GPC


leukocytosis worst





Objective


Vital Signs





Last 24 Hour Vital Signs








  Date Time  Temp Pulse Resp B/P (MAP) Pulse Ox O2 Delivery O2 Flow Rate FiO2


 


18 08:06 98.5 87 18 121/85 100   


 


18 04:59 98.2       


 


18 04:00 98.2 76 18 122/72 100   


 


18 00:00 98.0 83 19 108/71 100   


 


18 20:00 97.2 74 18 118/86 100   


 


18 16:17 98.0 85 20 121/81 98   


 


18 12:50 98.9       


 


18 12:45 98.9       


 


18 11:51 101.1 111 20 133/83 100   








Height (Feet):  5


Height (Inches):  7.00


Weight (Pounds):  200


Objective


HEENT:  No pale conjunctivae.  No icterus.


NECK:  No lymphadenopathy.


CHEST:  Clear.


HEART:  S1 and S2.


ABDOMEN:  Soft and nontender.


EXTREMITIES:  No cyanosis at this time.


NEUROLOGIC:  Awake and alert.





Microbiology








 Date/Time


Source Procedure


Growth Status


 


 


 18 04:40


Blood Blood Culture - Preliminary


Gram Positive Cocci Resulted


 


 18 04:20


Blood Blood Culture - Preliminary


Gram Positive Cocci Resulted


 


 18 19:50


Nasopharynx Influenza Types A,B Antigen (PALMER) - Final Complete











Laboratory Tests








Test


  18


23:00 18


06:58


 


Urine Opiates Screen


  Positive


(NEGATIVE)  H 


 


 


Urine Barbiturates Screen


  Negative


(NEGATIVE) 


 


 


Phencyclidine (PCP) Screen


  Negative


(NEGATIVE) 


 


 


Urine Amphetamines Screen


  Negative


(NEGATIVE) 


 


 


Urine Benzodiazepines Screen


  Negative


(NEGATIVE) 


 


 


Urine Cocaine Screen


  Negative


(NEGATIVE) 


 


 


Urine Marijuana (THC) Screen


  Positive


(NEGATIVE)  H 


 


 


White Blood Count


  


  21.3 K/UL


(4.8-10.8)  H


 


Red Blood Count


  


  3.43 M/UL


(4.20-5.40)  L


 


Hemoglobin


  


  10.2 G/DL


(12.0-16.0)  L


 


Hematocrit


  


  30.4 %


(37.0-47.0)  L


 


Mean Corpuscular Volume  89 FL (80-99)  


 


Mean Corpuscular Hemoglobin


  


  29.8 PG


(27.0-31.0)


 


Mean Corpuscular Hemoglobin


Concent 


  33.6 G/DL


(32.0-36.0)


 


Red Cell Distribution Width


  


  15.3 %


(11.6-14.8)  H


 


Platelet Count


  


  121 K/UL


(150-450)  L


 


Mean Platelet Volume


  


  8.5 FL


(6.5-10.1)


 


Neutrophils (%) (Auto)


  


  % (45.0-75.0)


 


 


Lymphocytes (%) (Auto)


  


  % (20.0-45.0)


 


 


Monocytes (%) (Auto)   % (1.0-10.0)  


 


Eosinophils (%) (Auto)   % (0.0-3.0)  


 


Basophils (%) (Auto)   % (0.0-2.0)  


 


Neutrophils % (Manual)  Pending  


 


Lymphocytes % (Manual)  Pending  


 


Platelet Estimate  Pending  


 


Platelet Morphology  Pending  


 


Activated Partial


Thromboplast Time 


  32 SEC (23-33)


 


 


Sodium Level


  


  137 MMOL/L


(136-145)


 


Potassium Level


  


  4.0 MMOL/L


(3.5-5.1)


 


Chloride Level


  


  103 MMOL/L


()


 


Carbon Dioxide Level


  


  23 MMOL/L


(21-32)


 


Anion Gap


  


  11 mmol/L


(5-15)


 


Blood Urea Nitrogen


  


  7 mg/dL (7-18)


 


 


Creatinine


  


  0.7 MG/DL


(0.55-1.30)


 


Estimat Glomerular Filtration


Rate 


  > 60 mL/min


(>60)


 


Glucose Level


  


  129 MG/DL


()  H


 


Calcium Level


  


  9.3 MG/DL


(8.5-10.1)


 


Total Bilirubin


  


  0.4 MG/DL


(0.2-1.0)


 


Aspartate Amino Transf


(AST/SGOT) 


  55 U/L (15-37)


H


 


Alanine Aminotransferase


(ALT/SGPT) 


  68 U/L (12-78)


 


 


Alkaline Phosphatase


  


  187 U/L


()  H


 


Total Protein


  


  7.8 G/DL


(6.4-8.2)


 


Albumin


  


  3.2 G/DL


(3.4-5.0)  L


 


Globulin  4.6 g/dL  


 


Albumin/Globulin Ratio


  


  0.7 (1.0-2.7)


L


 


Amylase Level


  


  80 U/L


()


 


Lipase


  


  328 U/L


()











Current Medications








 Medications


  (Trade)  Dose


 Ordered  Sig/Indira


 Route


 PRN Reason  Start Time


 Stop Time Status Last Admin


Dose Admin


 


 Acetaminophen


  (Tylenol)  650 mg  Q4H  PRN


 ORAL


 fever  18 05:30


 18 05:29  18 11:46


 


 


 Acetaminophen/


 Hydrocodone Bitart


  (Norco 5/325)  1 tab  Q4H  PRN


 ORAL


 Moderate Pain (Pain Scale 4-6)  18 05:30


 18 05:29   


 


 


 Al Hydroxide/Mg


 Hydroxide


  (Mylanta II)  30 ml  Q6H  PRN


 ORAL


 dyspepsia  18 05:30


 18 05:29   


 


 


 Dextrose


  (Dextrose 50%)    STAT  PRN


 IV


 Hypoglycemia  18 05:30


 18 05:29   


 


 


 Dextrose/Sodium


 Chloride  1,000 ml @ 


 75 mls/hr  [id number]


 IV


   18 05:19


 18 05:18  18 19:11


 


 


 Diphenhydramine


 HCl


  (Benadryl)  25 mg  Q6H  PRN


 ORAL


 Itching/Pruritis  18 05:30


 18 05:29   


 


 


 Heparin Sodium


  (Porcine)


  (Heparin 5000


 units/ml)  5,000 units  EVERY 12  HOURS


 SUBQ


   18 09:00


 18 08:59   


 


 


 Lorazepam


  (Ativan 2mg/ml


 1ml)  1 mg  Q4H  PRN


 IV


 agitation  18 05:30


 18 05:29   


 


 


 Morphine Sulfate


  (Morphine


 Sulfate)  2 mg  Q4H  PRN


 IVP


 severe  Pain (Pain Scale 7-10)  18 05:30


 18 05:29  18 04:29


 


 


 Nitroglycerin


  (Ntg)  0.4 mg  Q5M X 3 DOSES PRN


 SL


 Prn Chest Pain  18 05:30


 18 05:29   


 


 


 Ondansetron HCl


  (Zofran)  4 mg  Q6H  PRN


 IVP


 Nausea & Vomiting  18 05:30


 18 05:29  18 19:12


 


 


 Pantoprazole


  (Protonix)  40 mg  DAILY


 IV


   18 09:00


 18 08:59  18 09:16


 


 


 Piperacillin Sod/


 Tazobactam Sod


 3.375 gm/Sodium


 Chloride  110 ml @ 


 27.5 mls/hr  Q8HR


 IVPB


   18 14:00


 18 13:59  18 06:27


 


 


 Polyethylene


 Glycol


  (Miralax)  17 gm  HSPRN  PRN


 ORAL


 Constipation  18 05:30


 18 05:29   


 


 


 Prednisone


  (predniSONE)  20 mg  BID


 ORAL


   18 09:00


 18 08:59  18 19:13


 


 


 Promethazine HCl


  (Phenergan)  25 mg  Q8H  PRN


 ORAL


 Nausea & Vomiting  18 10:45


 18 10:44   


 


 


 Temazepam


  (Restoril)  15 mg  HSPRN  PRN


 ORAL


 Insomnia  18 05:30


 18 05:29  18 23:25


 

















Karol Burton M.D. 2018 09:14

## 2018-01-20 NOTE — DIAGNOSTIC IMAGING REPORT
Indication: Back pain, right breast cancer

 

Technique: Whole-body bone scan performed utilizing 25 mCi of technetium MDP. Whole

body study performed from the head to the feet.

 

Comparison: None.

 

Findings: Exam demonstrates a single area of slight increased uptake in the left

posterior third rib. This is nonspecific. No other abnormal bony activity is

identified. Renal excretion is symmetric.

 

Impression: Minimal slight increased uptake in the left posterior third rib. This is

nonspecific. Correlation with rib films of this area may be helpful to determine

whether or not it may be traumatic.

 

Otherwise negative.

## 2018-01-20 NOTE — INTERNAL MED PROGRESS NOTE
Subjective


Date of Service:  Jan 20, 2018


Physician Name


Kylah Olson


Attending Physician


Dave Christine MD





Current Medications








 Medications


  (Trade)  Dose


 Ordered  Sig/Indira


 Route


 PRN Reason  Start Time


 Stop Time Status Last Admin


Dose Admin


 


 Acetaminophen


  (Tylenol)  650 mg  Q4H  PRN


 ORAL


 fever  1/19/18 05:30


 2/18/18 05:29  1/19/18 11:46


 


 


 Acetaminophen/


 Hydrocodone Bitart


  (Norco 5/325)  1 tab  Q4H  PRN


 ORAL


 Moderate Pain (Pain Scale 4-6)  1/19/18 05:30


 1/26/18 05:29   


 


 


 Al Hydroxide/Mg


 Hydroxide


  (Mylanta II)  30 ml  Q6H  PRN


 ORAL


 dyspepsia  1/19/18 05:30


 2/18/18 05:29   


 


 


 Dextrose


  (Dextrose 50%)    STAT  PRN


 IV


 Hypoglycemia  1/19/18 05:30


 2/18/18 05:29   


 


 


 Dextrose/Sodium


 Chloride  1,000 ml @ 


 75 mls/hr  [id number]


 IV


   1/19/18 05:19


 2/18/18 05:18  1/19/18 19:11


 


 


 Diphenhydramine


 HCl


  (Benadryl)  25 mg  Q6H  PRN


 ORAL


 Itching/Pruritis  1/19/18 05:30


 2/18/18 05:29   


 


 


 Heparin Sodium


  (Porcine)


  (Heparin 5000


 units/ml)  5,000 units  EVERY 12  HOURS


 SUBQ


   1/19/18 09:00


 2/18/18 08:59   


 


 


 Lorazepam


  (Ativan 2mg/ml


 1ml)  1 mg  Q4H  PRN


 IV


 agitation  1/19/18 05:30


 1/26/18 05:29   


 


 


 Morphine Sulfate


  (Morphine


 Sulfate)  2 mg  Q4H  PRN


 IVP


 severe  Pain (Pain Scale 7-10)  1/19/18 05:30


 1/26/18 05:29  1/20/18 11:38


 


 


 Nitroglycerin


  (Ntg)  0.4 mg  Q5M X 3 DOSES PRN


 SL


 Prn Chest Pain  1/19/18 05:30


 2/18/18 05:29   


 


 


 Ondansetron HCl


  (Zofran)  4 mg  Q6H  PRN


 IVP


 Nausea & Vomiting  1/19/18 05:30


 2/18/18 05:29  1/19/18 19:12


 


 


 Pantoprazole


  (Protonix)  40 mg  DAILY


 IV


   1/19/18 09:00


 2/18/18 08:59  1/19/18 09:16


 


 


 Polyethylene


 Glycol


  (Miralax)  17 gm  HSPRN  PRN


 ORAL


 Constipation  1/19/18 05:30


 2/18/18 05:29   


 


 


 Prednisone


  (predniSONE)  20 mg  BID


 ORAL


   1/19/18 09:00


 2/18/18 08:59  1/19/18 19:13


 


 


 Promethazine HCl


  (Phenergan)  25 mg  Q8H  PRN


 ORAL


 Nausea & Vomiting  1/19/18 10:45


 2/18/18 10:44   


 


 


 Temazepam


  (Restoril)  15 mg  HSPRN  PRN


 ORAL


 Insomnia  1/19/18 05:30


 1/26/18 05:29  1/19/18 23:25


 


 


 Vancomycin HCl


  (Vanco rx to


 dose)  1 ea  DAILY  PRN


 MISC


 Per rx protocol  1/20/18 09:15


 2/19/18 09:14   


 


 


 Vancomycin HCl 2


 gm/Dextrose  550 ml @ 


 275 mls/hr  ONCE  ONCE


 IVPB


   1/20/18 11:00


 1/20/18 12:59   


 


 


 Vancomycin HCl/


 Dextrose  250 ml @ 


 125 mls/hr  Q12HR@1100,2300


 IVPB


   1/20/18 23:00


 1/25/18 22:59   


 








Allergies:  


Coded Allergies:  


     No Known Allergies (Unverified , 10/12/17)


ROS Limited/Unobtainable:  No


Constitutional:  Reports: no symptoms


HEENT:  Reports: no symptoms


Cardiovascular:  Reports: no symptoms


Respiratory:  Reports: no symptoms


Gastrointestinal/Abdominal:  Reports: nausea, vomiting


Genitourinary:  Reports: no symptoms


Neurologic/Psychiatric:  Reports: no symptoms


Subjective


33 YO F admitted with nausea and vomiting.  Now sepsis.  Cover for Int Octavio-Dr Christine





Objective





Last Vital Signs








  Date Time  Temp Pulse Resp B/P (MAP) Pulse Ox O2 Delivery O2 Flow Rate FiO2


 


1/20/18 08:06 98.5 87 18 121/85 100   


 


1/19/18 05:52      Room Air  








General Appearance:  WD/WN, alert, moderate distress


EENT:  PERRL/EOMI, normal ENT inspection


Neck:  non-tender, normal alignment, supple, normal inspection


Cardiovascular:  normal peripheral pulses, normal rate, regular rhythm, no 

gallop/murmur, no JVD


Respiratory/Chest:  chest wall non-tender, lungs clear, normal breath sounds, 

no respiratory distress, no accessory muscle use


Abdomen:  normal bowel sounds, non tender, soft, no organomegaly, no mass


Extremities:  normal range of motion, non-tender


Neurologic:  CNs II-XII grossly normal, no motor/sensory deficits


Skin:  normal pigmentation, warm/dry





Laboratory Tests








Test


  1/19/18


23:00 1/20/18


06:58 1/20/18


10:30


 


Urine Opiates Screen


  Positive


(NEGATIVE)  H 


  


 


 


Urine Barbiturates Screen


  Negative


(NEGATIVE) 


  


 


 


Phencyclidine (PCP) Screen


  Negative


(NEGATIVE) 


  


 


 


Urine Amphetamines Screen


  Negative


(NEGATIVE) 


  


 


 


Urine Benzodiazepines Screen


  Negative


(NEGATIVE) 


  


 


 


Urine Cocaine Screen


  Negative


(NEGATIVE) 


  


 


 


Urine Marijuana (THC) Screen


  Positive


(NEGATIVE)  H 


  


 


 


White Blood Count


  


  21.3 K/UL


(4.8-10.8)  H 


 


 


Red Blood Count


  


  3.43 M/UL


(4.20-5.40)  L 


 


 


Hemoglobin


  


  10.2 G/DL


(12.0-16.0)  L 


 


 


Hematocrit


  


  30.4 %


(37.0-47.0)  L 


 


 


Mean Corpuscular Volume  89 FL (80-99)   


 


Mean Corpuscular Hemoglobin


  


  29.8 PG


(27.0-31.0) 


 


 


Mean Corpuscular Hemoglobin


Concent 


  33.6 G/DL


(32.0-36.0) 


 


 


Red Cell Distribution Width


  


  15.3 %


(11.6-14.8)  H 


 


 


Platelet Count


  


  121 K/UL


(150-450)  L 


 


 


Mean Platelet Volume


  


  8.5 FL


(6.5-10.1) 


 


 


Neutrophils (%) (Auto)


  


  % (45.0-75.0)


  


 


 


Lymphocytes (%) (Auto)


  


  % (20.0-45.0)


  


 


 


Monocytes (%) (Auto)   % (1.0-10.0)   


 


Eosinophils (%) (Auto)   % (0.0-3.0)   


 


Basophils (%) (Auto)   % (0.0-2.0)   


 


Differential Total Cells


Counted 


  100  


  


 


 


Neutrophils % (Manual)  87 % (45-75)  H 


 


Lymphocytes % (Manual)  5 % (20-45)  L 


 


Monocytes % (Manual)  6 % (1-10)   


 


Eosinophils % (Manual)  0 % (0-3)   


 


Basophils % (Manual)  0 % (0-2)   


 


Band Neutrophils  2 % (0-8)   


 


Platelet Estimate  Decreased  L 


 


Platelet Morphology  Normal   


 


Hypochromasia  2+   


 


Anisocytosis  1+   


 


Activated Partial


Thromboplast Time 


  32 SEC (23-33)


  


 


 


Sodium Level


  


  137 MMOL/L


(136-145) 


 


 


Potassium Level


  


  4.0 MMOL/L


(3.5-5.1) 


 


 


Chloride Level


  


  103 MMOL/L


() 


 


 


Carbon Dioxide Level


  


  23 MMOL/L


(21-32) 


 


 


Anion Gap


  


  11 mmol/L


(5-15) 


 


 


Blood Urea Nitrogen


  


  7 mg/dL (7-18)


  


 


 


Creatinine


  


  0.7 MG/DL


(0.55-1.30) 


 


 


Estimat Glomerular Filtration


Rate 


  > 60 mL/min


(>60) 


 


 


Glucose Level


  


  129 MG/DL


()  H 


 


 


Calcium Level


  


  9.3 MG/DL


(8.5-10.1) 


 


 


Total Bilirubin


  


  0.4 MG/DL


(0.2-1.0) 


 


 


Aspartate Amino Transf


(AST/SGOT) 


  55 U/L (15-37)


H 


 


 


Alanine Aminotransferase


(ALT/SGPT) 


  68 U/L (12-78)


  


 


 


Alkaline Phosphatase


  


  187 U/L


()  H 


 


 


Total Protein


  


  7.8 G/DL


(6.4-8.2) 


 


 


Albumin


  


  3.2 G/DL


(3.4-5.0)  L 


 


 


Globulin  4.6 g/dL   


 


Albumin/Globulin Ratio


  


  0.7 (1.0-2.7)


L 


 


 


Amylase Level


  


  80 U/L


() 


 


 


Lipase


  


  328 U/L


() 


 


 


Urine HCG, Qualitative   Negative  











Microbiology








 Date/Time


Source Procedure


Growth Status


 


 


 1/19/18 04:40


Blood Blood Culture - Preliminary


Gram Positive Cocci Resulted


 


 1/19/18 04:20


Blood Blood Culture - Preliminary


Gram Positive Cocci Resulted


 


 1/19/18 19:50


Nasopharynx Influenza Types A,B Antigen ([facility]) - Final Complete


 


 1/19/18 04:40


Urine,Clean Catch Urine Culture - Preliminary


Mixed Gram Positive Organism Resulted

















Intake and Output  


 


 1/19/18 1/20/18





 19:00 07:00


 


Intake Total 240 ml 


 


Balance 240 ml 


 


  


 


Intake Oral 240 ml 


 


# Voids 1 1











Assessment/Plan


Problem List:  


(1) Nausea & vomiting


Assessment & Plan:  continue zofran





(2) Leukocytosis


Assessment & Plan:  Due to sepsis.  Antibiotic per ID





(3) Bloodstream infection due to port-a-cath


Assessment & Plan:  Await surgery consult to remove port A Cath-await cultures.

  Start vanco per ID





(4) Sepsis


Assessment & Plan:  Gram pos cocci.  Start vanco per ID





(5) UTI (urinary tract infection)


(6) Breast CA


Assessment & Plan:  Currently on chemotherapy





Status:  not improved











KYLAH OLSON Jan 20, 2018 12:49

## 2018-01-20 NOTE — CONSULTATION
History of Present Illness


General


Chief Complaint:  Generalized Weakness


Referring physician:  ID


Reason for Consultation:  evaluate possibly infected port





Present Illness


HPI


32-year-old female with past medical history significant for right breast cancer

, status post lumpectomy, who is getting chemo.  The patient came to the 

hospital with nausea and vomiting, and was found to be febrile with 

leukocytosis and bacteremia.  During work up found to have UTI and complained 

of discomfort from left chest port for chemo infusion.  Surgery called to 

evaluate and possibly remove infected left chest port.  patient states she is 

well.  had left chest wall port placed in 12/2017 at outside facility prior to 

receiving chemo.


Allergies:  


Coded Allergies:  


     No Known Allergies (Unverified , 10/12/17)





Medication History


Scheduled


Amoxicillin* (Amoxil*), 500 MG ORAL THREE TIMES A DAY


Amoxicillin* (Amoxil*), 500 MG ORAL BID, (Reported)


No Known Medications* (NKM - No Known Medications*), 0 ., (Reported)


Prednisone* (Prednisone*), 20 MG ORAL BID





Scheduled PRN


Acetaminophen With Codeine (T#3) (Tylenol #3 Tab*), 1 TAB ORAL Q8H PRN for For 

Pain


Hydrocodone Bit/Acetaminophen 7.5-325* (Norco 7.5-325*), 1 TAB ORAL Q4H PRN for 

For Pain, (Reported)


Hydrocodone/Acetaminophen 5-325* (Hydrocodone/Acetaminophen 5-325*), 1 TAB ORAL 

Q4H PRN for For Pain, (Reported)





Patient History


History Provided By:  Patient


Healthcare decision maker





Resuscitation status


Full Code


Advanced Directive on File








Past Medical/Surgical History


Past Medical/Surgical History:  


(1) pain


(2) pain


(3) UTI (urinary tract infection)


(4) Chemotherapy induced nausea and vomiting


(5) Dehydration


(6) Electrolyte imbalance


(7) Abnormal LFTs


(8) Breast CA


(9) pain


(10) Leukocytosis


(11) Sepsis


(12) Nausea & vomiting


(13) Bloodstream infection due to port-a-cath





Review of Systems


Constitutional:  Reports: fever, Denies: no symptoms, see HPI, chills, sweats, 

malaise, weakness, other


Eye:  Denies: no symptoms, see HPI, eye pain, blurred vision, tearing, double 

vision, nose pain, nose congestion, acuity changes, discharge, other


ENT:  Denies: no symptoms, see HPI, ear pain, ear discharge, nose pain, nose 

congestion, throat pain, throat swelling, mouth pain, hearing loss, nasal 

discharge, other


Respiratory:  Denies: no symptoms, see HPI, cough, orthopnea, shortness of 

breath, stridor, wheezing, ANDRADE, sputum, other


Cardiovascular:  Denies: no symptoms, see HPI, chest pain, edema, palpitations, 

syncope, PND, other


Gastrointestinal:  Denies: no symptoms, see HPI, abdominal pain, constipation, 

diarrhea, nausea, vomiting, melena, hematemesis, other


Genitourinary:  Denies: no symptoms, see HPI, discharge, dysuria, frequency, 

hematuria, pain, retention, incontinence, urgency, vag bleed/dc, other


Musculoskeletal:  Denies: no symptoms, see HPI, back pain, gout, joint pain, 

joint swelling, muscle pain, muscle stiffness, other


Skin:  Denies: no symptoms, see HPI, rash, change in color, change in hair/nails

, dryness, lesions, other


Psychiatric:  Denies: no symptoms, see HPI, prior hx, anxiety, depressed 

feelings, emotional problems, SI, HI, hallucinations, other


Neurological:  Denies: no symptoms, see HPI, headache, numbness, paresthesia, 

seizure, tingling, tremors, focal weakness, syncope, dizziness, other


Endocrine:  Denies: no symptoms, see HPI, excessive sweating, flushing, 

intolerance to temperature, increased thirst, increased urine, unexplained 

weight loss, other


Hematologic/Lymphatic:  Denies: no symptoms, see HPI, anemia, blood clots, easy 

bleeding, easy bruising, swollen glands, diathesis, other





Physical Exam


General Appearance:  no apparent distress, alert


Lines, tubes and drains:  peripheral, other - left chest port


HEENT:  atraumatic


Neck:  supple


Respiratory/Chest:  lungs clear, normal breath sounds, no respiratory distress, 

no accessory muscle use


Cardiovascular/Chest:  normal peripheral pulses, normal rate, regular rhythm


Abdomen:  normal bowel sounds, non tender, soft, no organomegaly


Extremities:  normal range of motion, non-tender


Skin Exam:  normal pigmentation, warm/dry


Neurologic:  alert, oriented x 3, responsive





Last 24 Hour Vital Signs








  Date Time  Temp Pulse Resp B/P (MAP) Pulse Ox O2 Delivery O2 Flow Rate FiO2


 


1/20/18 12:00 98.5 87 18 131/93 100   


 


1/20/18 08:06 98.5 87 18 121/85 100   


 


1/20/18 04:59 98.2       


 


1/20/18 04:00 98.2 76 18 122/72 100   


 


1/20/18 00:00 98.0 83 19 108/71 100   


 


1/19/18 20:00 97.2 74 18 118/86 100   


 


1/19/18 16:17 98.0 85 20 121/81 98   

















Intake and Output  


 


 1/19/18 1/20/18





 19:00 07:00


 


Intake Total 240 ml 


 


Balance 240 ml 


 


  


 


Intake Oral 240 ml 


 


# Voids 1 1











Laboratory Tests








Test


  1/19/18


23:00 1/20/18


06:58 1/20/18


10:30


 


Urine Opiates Screen


  Positive


(NEGATIVE)  H 


  


 


 


Urine Barbiturates Screen


  Negative


(NEGATIVE) 


  


 


 


Phencyclidine (PCP) Screen


  Negative


(NEGATIVE) 


  


 


 


Urine Amphetamines Screen


  Negative


(NEGATIVE) 


  


 


 


Urine Benzodiazepines Screen


  Negative


(NEGATIVE) 


  


 


 


Urine Cocaine Screen


  Negative


(NEGATIVE) 


  


 


 


Urine Marijuana (THC) Screen


  Positive


(NEGATIVE)  H 


  


 


 


White Blood Count


  


  21.3 K/UL


(4.8-10.8)  H 


 


 


Red Blood Count


  


  3.43 M/UL


(4.20-5.40)  L 


 


 


Hemoglobin


  


  10.2 G/DL


(12.0-16.0)  L 


 


 


Hematocrit


  


  30.4 %


(37.0-47.0)  L 


 


 


Mean Corpuscular Volume  89 FL (80-99)   


 


Mean Corpuscular Hemoglobin


  


  29.8 PG


(27.0-31.0) 


 


 


Mean Corpuscular Hemoglobin


Concent 


  33.6 G/DL


(32.0-36.0) 


 


 


Red Cell Distribution Width


  


  15.3 %


(11.6-14.8)  H 


 


 


Platelet Count


  


  121 K/UL


(150-450)  L 


 


 


Mean Platelet Volume


  


  8.5 FL


(6.5-10.1) 


 


 


Neutrophils (%) (Auto)


  


  % (45.0-75.0)


  


 


 


Lymphocytes (%) (Auto)


  


  % (20.0-45.0)


  


 


 


Monocytes (%) (Auto)   % (1.0-10.0)   


 


Eosinophils (%) (Auto)   % (0.0-3.0)   


 


Basophils (%) (Auto)   % (0.0-2.0)   


 


Differential Total Cells


Counted 


  100  


  


 


 


Neutrophils % (Manual)  87 % (45-75)  H 


 


Lymphocytes % (Manual)  5 % (20-45)  L 


 


Monocytes % (Manual)  6 % (1-10)   


 


Eosinophils % (Manual)  0 % (0-3)   


 


Basophils % (Manual)  0 % (0-2)   


 


Band Neutrophils  2 % (0-8)   


 


Platelet Estimate  Decreased  L 


 


Platelet Morphology  Normal   


 


Hypochromasia  2+   


 


Anisocytosis  1+   


 


Activated Partial


Thromboplast Time 


  32 SEC (23-33)


  


 


 


Sodium Level


  


  137 MMOL/L


(136-145) 


 


 


Potassium Level


  


  4.0 MMOL/L


(3.5-5.1) 


 


 


Chloride Level


  


  103 MMOL/L


() 


 


 


Carbon Dioxide Level


  


  23 MMOL/L


(21-32) 


 


 


Anion Gap


  


  11 mmol/L


(5-15) 


 


 


Blood Urea Nitrogen


  


  7 mg/dL (7-18)


  


 


 


Creatinine


  


  0.7 MG/DL


(0.55-1.30) 


 


 


Estimat Glomerular Filtration


Rate 


  > 60 mL/min


(>60) 


 


 


Glucose Level


  


  129 MG/DL


()  H 


 


 


Calcium Level


  


  9.3 MG/DL


(8.5-10.1) 


 


 


Total Bilirubin


  


  0.4 MG/DL


(0.2-1.0) 


 


 


Aspartate Amino Transf


(AST/SGOT) 


  55 U/L (15-37)


H 


 


 


Alanine Aminotransferase


(ALT/SGPT) 


  68 U/L (12-78)


  


 


 


Alkaline Phosphatase


  


  187 U/L


()  H 


 


 


Total Protein


  


  7.8 G/DL


(6.4-8.2) 


 


 


Albumin


  


  3.2 G/DL


(3.4-5.0)  L 


 


 


Globulin  4.6 g/dL   


 


Albumin/Globulin Ratio


  


  0.7 (1.0-2.7)


L 


 


 


Amylase Level


  


  80 U/L


() 


 


 


Lipase


  


  328 U/L


() 


 


 


Urine HCG, Qualitative   Negative  











Microbiology








 Date/Time


Source Procedure


Growth Status


 


 


 1/19/18 19:50


Nasopharynx Influenza Types A,B Antigen (PALMER) - Final Complete








Height (Feet):  5


Height (Inches):  7.00


Weight (Pounds):  200


Medications





Current Medications








 Medications


  (Trade)  Dose


 Ordered  Sig/Indira


 Route


 PRN Reason  Start Time


 Stop Time Status Last Admin


Dose Admin


 


 Acetaminophen


  (Tylenol)  650 mg  Q4H  PRN


 ORAL


 fever  1/19/18 05:30


 2/18/18 05:29  1/19/18 11:46


 


 


 Acetaminophen/


 Hydrocodone Bitart


  (Norco 5/325)  1 tab  Q4H  PRN


 ORAL


 Moderate Pain (Pain Scale 4-6)  1/19/18 05:30


 1/26/18 05:29   


 


 


 Al Hydroxide/Mg


 Hydroxide


  (Mylanta II)  30 ml  Q6H  PRN


 ORAL


 dyspepsia  1/19/18 05:30


 2/18/18 05:29   


 


 


 Dextrose


  (Dextrose 50%)    STAT  PRN


 IV


 Hypoglycemia  1/19/18 05:30


 2/18/18 05:29   


 


 


 Dextrose/Sodium


 Chloride  1,000 ml @ 


 75 mls/hr  [id number]


 IV


   1/19/18 05:19


 2/18/18 05:18  1/19/18 19:11


 


 


 Diphenhydramine


 HCl


  (Benadryl)  25 mg  Q6H  PRN


 ORAL


 Itching/Pruritis  1/19/18 05:30


 2/18/18 05:29   


 


 


 Heparin Sodium


  (Porcine)


  (Heparin 5000


 units/ml)  5,000 units  EVERY 12  HOURS


 SUBQ


   1/19/18 09:00


 2/18/18 08:59   


 


 


 Lorazepam


  (Ativan 2mg/ml


 1ml)  1 mg  Q4H  PRN


 IV


 agitation  1/19/18 05:30


 1/26/18 05:29   


 


 


 Morphine Sulfate


  (Morphine


 Sulfate)  2 mg  Q4H  PRN


 IVP


 severe  Pain (Pain Scale 7-10)  1/19/18 05:30


 1/26/18 05:29  1/20/18 11:38


 


 


 Nitroglycerin


  (Ntg)  0.4 mg  Q5M X 3 DOSES PRN


 SL


 Prn Chest Pain  1/19/18 05:30


 2/18/18 05:29   


 


 


 Ondansetron HCl


  (Zofran)  4 mg  Q6H  PRN


 IVP


 Nausea & Vomiting  1/19/18 05:30


 2/18/18 05:29  1/19/18 19:12


 


 


 Pantoprazole


  (Protonix)  40 mg  DAILY


 IV


   1/19/18 09:00


 2/18/18 08:59  1/19/18 09:16


 


 


 Polyethylene


 Glycol


  (Miralax)  17 gm  HSPRN  PRN


 ORAL


 Constipation  1/19/18 05:30


 2/18/18 05:29   


 


 


 Prednisone


  (predniSONE)  20 mg  DAILY


 ORAL


   1/21/18 09:00


 2/18/18 08:59   


 


 


 Promethazine HCl


  (Phenergan)  25 mg  Q8H  PRN


 ORAL


 Nausea & Vomiting  1/19/18 10:45


 2/18/18 10:44   


 


 


 Temazepam


  (Restoril)  15 mg  HSPRN  PRN


 ORAL


 Insomnia  1/19/18 05:30


 1/26/18 05:29  1/19/18 23:25


 


 


 Vancomycin HCl


  (Vanco rx to


 dose)  1 ea  DAILY  PRN


 MISC


 Per rx protocol  1/20/18 09:15


 2/19/18 09:14   


 


 


 Vancomycin HCl/


 Dextrose  250 ml @ 


 125 mls/hr  Q12HR@1100,2300


 IVPB


   1/20/18 23:00


 1/25/18 22:59   


 











Assessment/Plan


Problem List:  


(1) Sepsis


ICD Codes:  A41.9 - Sepsis, unspecified organism


SNOMED:  48135701


(2) Bloodstream infection due to port-a-cath


Assessment & Plan:  Patient seen and port evaluated.  port entry site on left 

chest clean without issues.  at area of clavicle there is an open wound over 

the port which is likely site of needle entry or tunnel entry for port 

insertion. when palpated some sebum can be evacuated.  no erythema, edema, or 

active drainage.  





source of sepsis can possibly be from port.  there is open area of non healing 

tissue around tunnel site.  will need to first ensure no other etiology of 

sepsis prior to removing port given that no gross signs of infection.  port was 

placed some time ago and may be harboring subacute infectious process and will 

need to be removed.  but if other etiology of bacteremia can be identified as 

source would not benefit from port removal.  will monitor for the next few 

days.  await final blood culture.


ICD Codes:  T80.211A - Bloodstream infection due to central venous catheter, 

initial encounter


SNOMED:  143442611


Status:  stable











Christoph Cason Jan 20, 2018 14:09

## 2018-01-21 VITALS — SYSTOLIC BLOOD PRESSURE: 121 MMHG | DIASTOLIC BLOOD PRESSURE: 84 MMHG

## 2018-01-21 VITALS — SYSTOLIC BLOOD PRESSURE: 120 MMHG | DIASTOLIC BLOOD PRESSURE: 82 MMHG

## 2018-01-21 VITALS — SYSTOLIC BLOOD PRESSURE: 133 MMHG | DIASTOLIC BLOOD PRESSURE: 86 MMHG

## 2018-01-21 VITALS — DIASTOLIC BLOOD PRESSURE: 79 MMHG | SYSTOLIC BLOOD PRESSURE: 128 MMHG

## 2018-01-21 VITALS — SYSTOLIC BLOOD PRESSURE: 118 MMHG | DIASTOLIC BLOOD PRESSURE: 80 MMHG

## 2018-01-21 VITALS — DIASTOLIC BLOOD PRESSURE: 79 MMHG | SYSTOLIC BLOOD PRESSURE: 116 MMHG

## 2018-01-21 LAB
% IRON SATURATION: 8 % (ref 15–50)
ADD MANUAL DIFF: NO
BASOPHILS NFR BLD AUTO: 0.6 % (ref 0–2)
EOSINOPHIL NFR BLD AUTO: 0 % (ref 0–3)
ERYTHROCYTE [DISTWIDTH] IN BLOOD BY AUTOMATED COUNT: 15.5 % (ref 11.6–14.8)
FERRITIN SERPL-MCNC: 715 NG/ML (ref 8–388)
HCT VFR BLD CALC: 31.6 % (ref 37–47)
HGB BLD-MCNC: 10.1 G/DL (ref 12–16)
IRON SERPL-MCNC: 18 UG/DL (ref 50–175)
LYMPHOCYTES NFR BLD AUTO: 11.9 % (ref 20–45)
MCV RBC AUTO: 89 FL (ref 80–99)
MONOCYTES NFR BLD AUTO: 6.1 % (ref 1–10)
NEUTROPHILS NFR BLD AUTO: 81.4 % (ref 45–75)
PLATELET # BLD: 112 K/UL (ref 150–450)
RBC # BLD AUTO: 3.55 M/UL (ref 4.2–5.4)
TIBC SERPL-MCNC: 229 UG/DL (ref 250–450)
UNSATURATED IRON BINDING: 211 UG/DL (ref 112–346)
WBC # BLD AUTO: 13.3 K/UL (ref 4.8–10.8)

## 2018-01-21 RX ADMIN — Medication SCH MLS/HR: at 13:24

## 2018-01-21 RX ADMIN — MORPHINE SULFATE PRN MG: 4 INJECTION INTRAVENOUS at 20:36

## 2018-01-21 RX ADMIN — HEPARIN SODIUM SCH UNITS: 5000 INJECTION INTRAVENOUS; SUBCUTANEOUS at 20:37

## 2018-01-21 RX ADMIN — HEPARIN SODIUM SCH UNITS: 5000 INJECTION INTRAVENOUS; SUBCUTANEOUS at 08:45

## 2018-01-21 RX ADMIN — MORPHINE SULFATE PRN MG: 4 INJECTION INTRAVENOUS at 00:47

## 2018-01-21 RX ADMIN — Medication SCH MLS/HR: at 23:13

## 2018-01-21 RX ADMIN — DEXTROSE AND SODIUM CHLORIDE SCH MLS/HR: 5; .45 INJECTION, SOLUTION INTRAVENOUS at 13:24

## 2018-01-21 RX ADMIN — MORPHINE SULFATE PRN MG: 4 INJECTION INTRAVENOUS at 05:40

## 2018-01-21 RX ADMIN — PANTOPRAZOLE SODIUM SCH MG: 40 INJECTION, POWDER, FOR SOLUTION INTRAVENOUS at 08:44

## 2018-01-21 RX ADMIN — MORPHINE SULFATE PRN MG: 4 INJECTION INTRAVENOUS at 10:03

## 2018-01-21 RX ADMIN — MORPHINE SULFATE PRN MG: 4 INJECTION INTRAVENOUS at 16:15

## 2018-01-21 RX ADMIN — DEXTROSE AND SODIUM CHLORIDE SCH MLS/HR: 5; .45 INJECTION, SOLUTION INTRAVENOUS at 10:39

## 2018-01-21 NOTE — GENERAL SURGERY PROGRESS NOTE
General Surgery-Progress Note


Subjective


Symptoms:  improved


Additional Comments


doing well.  no complaints.





Objective





Last 24 Hour Vital Signs








  Date Time  Temp Pulse Resp B/P (MAP) Pulse Ox O2 Delivery O2 Flow Rate FiO2


 


1/21/18 12:19 99.6 90 20 128/79 96 Room Air  


 


1/21/18 10:33 98.0       


 


1/21/18 08:00 98.0 87 20 121/84 98 Room Air  


 


1/21/18 04:00 98.0 92 19 120/82 98 Room Air  


 


1/21/18 00:00 98.0 91 19 116/79 97 Room Air  


 


1/20/18 20:00 98.0 93 19 119/81 98 Room Air  


 


1/20/18 16:00 98.1 91 20 137/88 97 Room Air  








I&O











Intake and Output  


 


 1/20/18 1/21/18





 19:00 07:00


 


Intake Total 850 ml 75 ml


 


Balance 850 ml 75 ml


 


  


 


Intake Oral 400 ml 


 


IV Total 450 ml 75 ml


 


# Voids 3 3


 


# Bowel Movements 1 1








Dressing:  dry


Wound:  clean


Drains:  none


Cardiovascular:  RSR


Respiratory:  clear


Abdomen:  soft, non-tender, present bowel sounds


Extremities:  no tenderness





Laboratory Tests








Test


  1/21/18


07:25 1/21/18


11:55


 


White Blood Count


  13.3 K/UL


(4.8-10.8)  H 


 


 


Red Blood Count


  3.55 M/UL


(4.20-5.40)  L 


 


 


Hemoglobin


  10.1 G/DL


(12.0-16.0)  L 


 


 


Hematocrit


  31.6 %


(37.0-47.0)  L 


 


 


Mean Corpuscular Volume 89 FL (80-99)   


 


Mean Corpuscular Hemoglobin


  28.4 PG


(27.0-31.0) 


 


 


Mean Corpuscular Hemoglobin


Concent 31.9 G/DL


(32.0-36.0)  L 


 


 


Red Cell Distribution Width


  15.5 %


(11.6-14.8)  H 


 


 


Platelet Count


  112 K/UL


(150-450)  L 


 


 


Mean Platelet Volume


  10.3 FL


(6.5-10.1)  H 


 


 


Neutrophils (%) (Auto)


  81.4 %


(45.0-75.0)  H 


 


 


Lymphocytes (%) (Auto)


  11.9 %


(20.0-45.0)  L 


 


 


Monocytes (%) (Auto)


  6.1 %


(1.0-10.0) 


 


 


Eosinophils (%) (Auto)


  0.0 %


(0.0-3.0) 


 


 


Basophils (%) (Auto)


  0.6 %


(0.0-2.0) 


 


 


Iron Level


  18 ug/dL


()  L 


 


 


Total Iron Binding Capacity


  229 ug/dL


(250-450)  L 


 


 


Percent Iron Saturation 8 % (15-50)  L 


 


Unsaturated Iron Binding


  211 ug/dL


(112-346) 


 


 


Ferritin


  715 NG/ML


(8-388)  H 


 


 


Vitamin B12 Level


  1926 PG/ML


(193-986)  H 


 


 


Folate


  6.5 NG/ML


(8.6-58.9)  L 


 


 


Vancomycin Level Trough


  


  5.3 ug/mL


(5.0-12.0)











Plan


Problems:  


(1) Sepsis


(2) Bloodstream infection due to port-a-cath


Assessment & Plan:  Patient seen and port evaluated.  port entry site on left 

chest clean without issues.  at area of clavicle there is an open wound over 

the port which is likely site of needle entry or tunnel entry for port 

insertion. when palpated some sebum can be evacuated.  no erythema, edema, or 

active drainage.  dressings from yesterday dry.  





Afebrile, HD stable, leukocytosis improved.  cultures reviewed.  





source of sepsis can possibly be from port.  there is open area of non healing 

tissue around tunnel site.  will need to first ensure no other etiology of 

sepsis prior to removing port given that no gross signs of infection.  port was 

placed some time ago and may be harboring subacute infectious process and will 

need to be removed.  but if other etiology of bacteremia can be identified as 

source would not benefit from port removal.  will monitor for the next few 

days.  await final blood culture.  





Will discuss with ID over next few days about port removal. 














Christoph Cason Jan 21, 2018 13:32

## 2018-01-21 NOTE — INTERNAL MED PROGRESS NOTE
Subjective


Date of Service:  Jan 21, 2018


Physician Name


Kylah Olson


Attending Physician


Dave Christine MD





Current Medications








 Medications


  (Trade)  Dose


 Ordered  Sig/Indira


 Route


 PRN Reason  Start Time


 Stop Time Status Last Admin


Dose Admin


 


 Acetaminophen


  (Tylenol)  650 mg  Q4H  PRN


 ORAL


 fever  1/19/18 05:30


 2/18/18 05:29  1/19/18 11:46


 


 


 Acetaminophen/


 Hydrocodone Bitart


  (Norco 5/325)  1 tab  Q4H  PRN


 ORAL


 Moderate Pain (Pain Scale 4-6)  1/19/18 05:30


 1/26/18 05:29   


 


 


 Al Hydroxide/Mg


 Hydroxide


  (Mylanta II)  30 ml  Q6H  PRN


 ORAL


 dyspepsia  1/19/18 05:30


 2/18/18 05:29   


 


 


 Dextrose


  (Dextrose 50%)    STAT  PRN


 IV


 Hypoglycemia  1/19/18 05:30


 2/18/18 05:29   


 


 


 Dextrose/Sodium


 Chloride  1,000 ml @ 


 75 mls/hr  [id number]


 IV


   1/19/18 05:19


 2/18/18 05:18  1/21/18 13:24


 


 


 Diphenhydramine


 HCl


  (Benadryl)  25 mg  Q6H  PRN


 ORAL


 Itching/Pruritis  1/19/18 05:30


 2/18/18 05:29   


 


 


 Folic Acid


  (Folate)  1 mg  DAILY


 ORAL


   1/22/18 09:00


 2/21/18 08:59   


 


 


 Heparin Sodium


  (Porcine)


  (Heparin 5000


 units/ml)  5,000 units  EVERY 12  HOURS


 SUBQ


   1/19/18 09:00


 2/18/18 08:59   


 


 


 Lorazepam


  (Ativan 2mg/ml


 1ml)  1 mg  Q4H  PRN


 IV


 agitation  1/19/18 05:30


 1/26/18 05:29   


 


 


 Morphine Sulfate


  (Morphine


 Sulfate)  4 mg  Q4H  PRN


 IVP


 Severe Pain (Pain Scale 7-10)  1/20/18 19:45


 1/27/18 19:44  1/21/18 10:03


 


 


 Nitroglycerin


  (Ntg)  0.4 mg  Q5M X 3 DOSES PRN


 SL


 Prn Chest Pain  1/19/18 05:30


 2/18/18 05:29   


 


 


 Ondansetron HCl


  (Zofran)  4 mg  Q6H  PRN


 IVP


 Nausea & Vomiting  1/19/18 05:30


 2/18/18 05:29  1/21/18 05:40


 


 


 Pantoprazole


  (Protonix)  40 mg  DAILY


 IV


   1/19/18 09:00


 2/18/18 08:59  1/21/18 08:44


 


 


 Polyethylene


 Glycol


  (Miralax)  17 gm  HSPRN  PRN


 ORAL


 Constipation  1/19/18 05:30


 2/18/18 05:29   


 


 


 Promethazine HCl


  (Phenergan)  25 mg  Q8H  PRN


 ORAL


 Nausea & Vomiting  1/19/18 10:45


 2/18/18 10:44   


 


 


 Temazepam


  (Restoril)  15 mg  HSPRN  PRN


 ORAL


 Insomnia  1/19/18 05:30


 1/26/18 05:29  1/19/18 23:25


 


 


 Vancomycin HCl


  (Vanco rx to


 dose)  1 ea  DAILY  PRN


 MISC


 Per rx protocol  1/20/18 09:15


 2/19/18 09:14   


 


 


 Vancomycin HCl/


 Dextrose  250 ml @ 


 125 mls/hr  Q12HR@1100,2300


 IVPB


   1/20/18 23:00


 1/25/18 22:59  1/21/18 13:24


 








Allergies:  


Coded Allergies:  


     No Known Allergies (Unverified , 10/12/17)


ROS Limited/Unobtainable:  Yes


Subjective


31 YO F admitted with nausea and vomiting.  Now sepsis.  Cover for Int Octavio-Dr Christine





Objective





Last Vital Signs








  Date Time  Temp Pulse Resp B/P (MAP) Pulse Ox O2 Delivery O2 Flow Rate FiO2


 


1/21/18 12:19 99.6 90 20 128/79 96 Room Air  











Laboratory Tests








Test


  1/21/18


07:25 1/21/18


11:55


 


White Blood Count


  13.3 K/UL


(4.8-10.8)  H 


 


 


Red Blood Count


  3.55 M/UL


(4.20-5.40)  L 


 


 


Hemoglobin


  10.1 G/DL


(12.0-16.0)  L 


 


 


Hematocrit


  31.6 %


(37.0-47.0)  L 


 


 


Mean Corpuscular Volume 89 FL (80-99)   


 


Mean Corpuscular Hemoglobin


  28.4 PG


(27.0-31.0) 


 


 


Mean Corpuscular Hemoglobin


Concent 31.9 G/DL


(32.0-36.0)  L 


 


 


Red Cell Distribution Width


  15.5 %


(11.6-14.8)  H 


 


 


Platelet Count


  112 K/UL


(150-450)  L 


 


 


Mean Platelet Volume


  10.3 FL


(6.5-10.1)  H 


 


 


Neutrophils (%) (Auto)


  81.4 %


(45.0-75.0)  H 


 


 


Lymphocytes (%) (Auto)


  11.9 %


(20.0-45.0)  L 


 


 


Monocytes (%) (Auto)


  6.1 %


(1.0-10.0) 


 


 


Eosinophils (%) (Auto)


  0.0 %


(0.0-3.0) 


 


 


Basophils (%) (Auto)


  0.6 %


(0.0-2.0) 


 


 


Iron Level


  18 ug/dL


()  L 


 


 


Total Iron Binding Capacity


  229 ug/dL


(250-450)  L 


 


 


Percent Iron Saturation 8 % (15-50)  L 


 


Unsaturated Iron Binding


  211 ug/dL


(112-346) 


 


 


Ferritin


  715 NG/ML


(8-388)  H 


 


 


Vitamin B12 Level


  1926 PG/ML


(193-986)  H 


 


 


Folate


  6.5 NG/ML


(8.6-58.9)  L 


 


 


C-Reactive Protein,


Quantitative 


  Pending  


 


 


Vancomycin Level Trough


  


  5.3 ug/mL


(5.0-12.0)











Microbiology








 Date/Time


Source Procedure


Growth Status


 


 


 1/20/18 10:05


Blood Blood Culture - Preliminary Resulted


 


 1/20/18 10:00


Blood Blood Culture - Preliminary Resulted


 


 1/19/18 04:40


Blood Blood Culture - Preliminary


Staphylococcus Aureus Resulted


 


 1/19/18 04:20


Blood Blood Culture - Preliminary


Staphylococcus Aureus Resulted





 1/20/18 11:59


Wound Gram Stain - Final Resulted


 


 1/20/18 11:59


Wound Wound Culture


Pending Resulted


 


 1/19/18 19:50


Nasopharynx Influenza Types A,B Antigen (PALMER) - Final Complete


 


 1/19/18 06:28


Stool Stool Culture - Preliminary


NORMAL FECAL MASOUD. Resulted


 


 1/19/18 04:40


Urine,Clean Catch Urine Culture - Final


Mixed Gram Positive Organism Complete

















Intake and Output  


 


 1/20/18 1/21/18





 19:00 07:00


 


Intake Total 850 ml 75 ml


 


Balance 850 ml 75 ml


 


  


 


Intake Oral 400 ml 


 


IV Total 450 ml 75 ml


 


# Voids 3 3


 


# Bowel Movements 1 1








Objective


General Appearance:  WD/WN, alert, moderate distress


EENT:  PERRL/EOMI, normal ENT inspection


Neck:  non-tender, normal alignment, supple, normal inspection


Cardiovascular:  normal peripheral pulses, normal rate, regular rhythm, no 

gallop/murmur, no JVD


Respiratory/Chest:  chest wall non-tender, lungs clear, normal breath sounds, 

no respiratory distress, no accessory muscle use


Abdomen:  normal bowel sounds, non tender, soft, no organomegaly, no mass


Extremities:  normal range of motion, non-tender


Neurologic:  CNs II-XII grossly normal, no motor/sensory deficits


Skin:  normal pigmentation, warm/dry





Assessment/Plan


Problem List:  


(1) Nausea & vomiting


Assessment & Plan:  continue zofran





(2) Leukocytosis


Assessment & Plan:  Due to sepsis.  Antibiotic per ID





(3) Bloodstream infection due to port-a-cath


Assessment & Plan:  See surgery consult reguarding removal port A Cath..  Start 

vanco per ID





(4) Sepsis


Assessment & Plan:  Staph aureus.  Continue vanco per ID





(5) UTI (urinary tract infection)


(6) Breast CA


Assessment & Plan:  Currently on chemotherapy





Status:  not improved











KYLAH OLSON Jan 21, 2018 14:00

## 2018-01-21 NOTE — DIAGNOSTIC IMAGING REPORT
Indication: Breast cancer, pain

 

Technique: CT scanning of the chest, abdomen, and pelvis performed with intravenous

and oral contrast material. Axial, coronal, and sagittal images were generated.

 

Dose:

Total Dose Length Product - DLP 1398 mGycm.

Volume CT Dose Index - CTDIvol(s) 16.95, 17.31 mGy.

Automated exposure control was utilized for dose reduction.

 

Comparison: None

 

Findings: 

 

CHEST:

 

There is an implanted port in the left chest. The catheter appears to enter the left

jugular vein and traverses to the right atrium. No contrast enhancement is noted in

the left jugular vein or left brachiocephalic vein. There is also stranding around

the left brachiocephalic vein and the superior mediastinum. The remainder of the

mediastinum is unremarkable. Pulmonary ruben are normal. There is a small left pleural

effusion. Atelectasis is noted in the left lung base. The lungs are otherwise

unremarkable. The heart is normal in size. There is a small pericardial effusion or

pericardial thickening.

 

Abdomen and pelvis:

 

The liver is unremarkable. The gallbladder is normal. The spleen is normal. The

pancreas is unremarkable. Adrenal glands are normal. The kidneys are normal. Aorta

and inferior vena cava are normal caliber. Retroperitoneum is free of adenopathy. The

bowel is normal. The bladder is collapsed. Uterus is normal. Ovaries are

unremarkable. There is some degenerative change in the lower cervical/upper thoracic

spine.

 

Impression: Implanted infusion port in the left chest. No definite fluid collection

around the port. However, there appears to be occlusion of the left jugular vein as

well as the left brachiocephalic vein with stranding around the brachiocephalic vein.

This suggests thrombophlebitis.

 

Small left pleural effusion with some atelectasis in left base.

 

Tiny pericardial effusion versus pericardial thickening.

 

Otherwise negative.

 

 

 

The CT scanner at [facility] is accredited by the [employer]

Radiology and the scans are performed using protocols designed to limit radiation

exposure to as low as reasonably achievable to attain images of sufficient resolution

adequate for diagnostic evaluation.

## 2018-01-21 NOTE — PULMONOLOGY PROGRESS NOTE
Assessment/Plan


Assessment/Plan


ASSESSMENT


sepsis with bacteremia (Staph aureus) 


possible PAC infection 


chemo induced nausea and vomiting


invasive ductal carcinoma grade III ,  s/p lumpectomy


dehydration 


e/lyte imbalance ( hypo Na, hypo K) 


abnormal LFT 


anemia of chronic disease


Folate deficiency anemia 





PLAN OF CARE


[facility] floor 


empiric abx 


ID follows


blood cx + Staph aureus , sepsis likely due to bacteremia , 


patient has PAC,  leuk trending up; 


possible port infection  , wound cx pending 


surgery follows  


 


bone scan negative 


CT C/A/P negative





pain management


O2 titrate prn


GI follows 


IVF


correct electrolytes as needed


tolerated CLD,   advance as tolerated, after CT scan


a/emetic prn


anemia work up c/w folic deficiency and anemia of chronic disease


stool OB to  r/o GI bleed


monitor H&H, prn transfusions


push po fluids


bowel regimen


PPI


urine tox screen + marijuana, opiates


abdominal U/S+HSM; AST with trend down 


  


case discussed and evaluated by supervising physician





Subjective


Allergies:  


Coded Allergies:  


     No Known Allergies (Unverified , 10/12/17)


Subjective


pain better controlled, 


leukocytosis with trend down,  afebrile





Objective





Last 24 Hour Vital Signs








  Date Time  Temp Pulse Resp B/P (MAP) Pulse Ox O2 Delivery O2 Flow Rate FiO2


 


1/21/18 12:19 99.6 90 20 128/79 96 Room Air  


 


1/21/18 10:33 98.0       


 


1/21/18 08:00 98.0 87 20 121/84 98 Room Air  


 


1/21/18 04:00 98.0 92 19 120/82 98 Room Air  


 


1/21/18 00:00 98.0 91 19 116/79 97 Room Air  


 


1/20/18 20:00 98.0 93 19 119/81 98 Room Air  


 


1/20/18 16:00 98.1 91 20 137/88 97 Room Air  

















Intake and Output  


 


 1/20/18 1/21/18





 19:00 07:00


 


Intake Total 850 ml 75 ml


 


Balance 850 ml 75 ml


 


  


 


Intake Oral 400 ml 


 


IV Total 450 ml 75 ml


 


# Voids 3 3


 


# Bowel Movements 1 1








Objective


General Appearance:  no apparent distress, A.AO x 3 young AA female 


Lines, tubes and drains:  peripheral,  L chest PAC


HEENT:  normocephalic, atraumatic, anicteric


Neck:  non-tender, supple


Respiratory/Chest:  lungs clear, no respiratory distress, no accessory muscle 

use


Cardiovascular/Chest:  normal rate, regular rhythm, no JVD


Abdomen:  normal bowel sounds, non tender, soft


Extremities:  non-tender, no calf tenderness, normal capillary refill


Skin Exam:  warm/dry


Neurologic:  CNs II-XII grossly normal, no motor/sensory deficits, alert, 

oriented x 3, responsive


Musculoskeletal:  normal muscle bulk





Microbiology








 Date/Time


Source Procedure


Growth Status


 


 


 1/20/18 10:05


Blood Blood Culture - Preliminary Resulted


 


 1/20/18 10:00


Blood Blood Culture - Preliminary Resulted


 


 1/19/18 04:40


Blood Blood Culture - Preliminary


Staphylococcus Aureus Resulted


 


 1/19/18 04:20


Blood Blood Culture - Preliminary


Staphylococcus Aureus Resulted





 1/20/18 11:59


Wound Gram Stain - Final Resulted


 


 1/20/18 11:59


Wound Wound Culture


Pending Resulted


 


 1/19/18 19:50


Nasopharynx Influenza Types A,B Antigen (PALMER) - Final Complete


 


 1/19/18 06:28


Stool Stool Culture - Preliminary


NORMAL FECAL MASOUD. Resulted


 


 1/19/18 04:40


Urine,Clean Catch Urine Culture - Final


Mixed Gram Positive Organism Complete








Laboratory Tests


1/21/18 07:25: 


White Blood Count 13.3H, Red Blood Count 3.55L, Hemoglobin 10.1L, Hematocrit 

31.6L, Mean Corpuscular Volume 89, Mean Corpuscular Hemoglobin 28.4, Mean 

Corpuscular Hemoglobin Concent 31.9L, Red Cell Distribution Width 15.5H, 

Platelet Count 112L, Mean Platelet Volume 10.3H, Neutrophils (%) (Auto) 81.4H, 

Lymphocytes (%) (Auto) 11.9L, Monocytes (%) (Auto) 6.1, Eosinophils (%) (Auto) 

0.0, Basophils (%) (Auto) 0.6, Iron Level 18L, Total Iron Binding Capacity 229L

, Percent Iron Saturation 8L, Unsaturated Iron Binding 211, Ferritin 715H, 

Vitamin B12 Level 1926H, Folate 6.5L





Current Medications








 Medications


  (Trade)  Dose


 Ordered  Sig/Indira


 Route


 PRN Reason  Start Time


 Stop Time Status Last Admin


Dose Admin


 


 Acetaminophen


  (Tylenol)  650 mg  Q4H  PRN


 ORAL


 fever  1/19/18 05:30


 2/18/18 05:29  1/19/18 11:46


 


 


 Acetaminophen/


 Hydrocodone Bitart


  (Norco 5/325)  1 tab  Q4H  PRN


 ORAL


 Moderate Pain (Pain Scale 4-6)  1/19/18 05:30


 1/26/18 05:29   


 


 


 Al Hydroxide/Mg


 Hydroxide


  (Mylanta II)  30 ml  Q6H  PRN


 ORAL


 dyspepsia  1/19/18 05:30


 2/18/18 05:29   


 


 


 Dextrose


  (Dextrose 50%)    STAT  PRN


 IV


 Hypoglycemia  1/19/18 05:30


 2/18/18 05:29   


 


 


 Dextrose/Sodium


 Chloride  1,000 ml @ 


 75 mls/hr  [id number]


 IV


   1/19/18 05:19


 2/18/18 05:18  1/20/18 23:15


 


 


 Diphenhydramine


 HCl


  (Benadryl)  25 mg  Q6H  PRN


 ORAL


 Itching/Pruritis  1/19/18 05:30


 2/18/18 05:29   


 


 


 Heparin Sodium


  (Porcine)


  (Heparin 5000


 units/ml)  5,000 units  EVERY 12  HOURS


 SUBQ


   1/19/18 09:00


 2/18/18 08:59   


 


 


 Lorazepam


  (Ativan 2mg/ml


 1ml)  1 mg  Q4H  PRN


 IV


 agitation  1/19/18 05:30


 1/26/18 05:29   


 


 


 Morphine Sulfate


  (Morphine


 Sulfate)  4 mg  Q4H  PRN


 IVP


 Severe Pain (Pain Scale 7-10)  1/20/18 19:45


 1/27/18 19:44  1/21/18 10:03


 


 


 Nitroglycerin


  (Ntg)  0.4 mg  Q5M X 3 DOSES PRN


 SL


 Prn Chest Pain  1/19/18 05:30


 2/18/18 05:29   


 


 


 Ondansetron HCl


  (Zofran)  4 mg  Q6H  PRN


 IVP


 Nausea & Vomiting  1/19/18 05:30


 2/18/18 05:29  1/21/18 05:40


 


 


 Pantoprazole


  (Protonix)  40 mg  DAILY


 IV


   1/19/18 09:00


 2/18/18 08:59  1/21/18 08:44


 


 


 Polyethylene


 Glycol


  (Miralax)  17 gm  HSPRN  PRN


 ORAL


 Constipation  1/19/18 05:30


 2/18/18 05:29   


 


 


 Prednisone


  (predniSONE)  20 mg  DAILY


 ORAL


   1/21/18 09:00


 2/18/18 08:59  1/21/18 08:44


 


 


 Promethazine HCl


  (Phenergan)  25 mg  Q8H  PRN


 ORAL


 Nausea & Vomiting  1/19/18 10:45


 2/18/18 10:44   


 


 


 Temazepam


  (Restoril)  15 mg  HSPRN  PRN


 ORAL


 Insomnia  1/19/18 05:30


 1/26/18 05:29  1/19/18 23:25


 


 


 Vancomycin HCl


  (Vanco rx to


 dose)  1 ea  DAILY  PRN


 MISC


 Per rx protocol  1/20/18 09:15


 2/19/18 09:14   


 


 


 Vancomycin HCl/


 Dextrose  250 ml @ 


 125 mls/hr  Q12HR@1100,2300


 IVPB


   1/20/18 23:00


 1/25/18 22:59  1/20/18 23:14


 

















Deangelo (Dioni)Perla NP Jan 21, 2018 12:45

## 2018-01-21 NOTE — GENERAL PROGRESS NOTE
Assessment/Plan


Problem List:  


(1) Breast CA


ICD Codes:  C50.919 - Malignant neoplasm of unspecified site of unspecified 

female breast


SNOMED:  702745457


(2) Abnormal LFTs


ICD Codes:  R94.5 - Abnormal results of liver function studies


SNOMED:  184047288


(3) Dehydration


ICD Codes:  E86.0 - Dehydration


SNOMED:  69058126


(4) pain


Assessment/Plan


fu CT


pain control


phenergan and zofran


fu labs





Subjective


ROS Limited/Unobtainable:  Yes


Allergies:  


Coded Allergies:  


     No Known Allergies (Unverified , 10/12/17)


Subjective


feeling better


had diarrhea with soft diet


back to full liquid diet





Objective





Last 24 Hour Vital Signs








  Date Time  Temp Pulse Resp B/P (MAP) Pulse Ox O2 Delivery O2 Flow Rate FiO2


 


1/21/18 06:10 98.0       


 


1/21/18 04:00 98.0 92 19 120/82 98 Room Air  


 


1/21/18 00:00 98.0 91 19 116/79 97 Room Air  


 


1/20/18 20:00 98.0 93 19 119/81 98 Room Air  


 


1/20/18 16:00 98.1 91 20 137/88 97 Room Air  


 


1/20/18 12:00 98.5 87 18 131/93 100   

















Intake and Output  


 


 1/20/18 1/21/18





 19:00 07:00


 


Intake Total 850 ml 75 ml


 


Balance 850 ml 75 ml


 


  


 


Intake Oral 400 ml 


 


IV Total 450 ml 75 ml


 


# Voids 3 3


 


# Bowel Movements 1 1








Laboratory Tests


1/20/18 10:30: Urine HCG, Qualitative Negative


Height (Feet):  5


Height (Inches):  7.00


Weight (Pounds):  200


General Appearance:  alert


EENT:  PERRL/EOMI


Neck:  supple


Cardiovascular:  normal rate


Respiratory/Chest:  decreased breath sounds


Abdomen:  normal bowel sounds, non tender, soft


Extremities:  non-tender











NATALIYA DAWSON Jan 21, 2018 08:28

## 2018-01-22 VITALS — DIASTOLIC BLOOD PRESSURE: 78 MMHG | SYSTOLIC BLOOD PRESSURE: 124 MMHG

## 2018-01-22 VITALS — DIASTOLIC BLOOD PRESSURE: 87 MMHG | SYSTOLIC BLOOD PRESSURE: 125 MMHG

## 2018-01-22 VITALS — SYSTOLIC BLOOD PRESSURE: 125 MMHG | DIASTOLIC BLOOD PRESSURE: 82 MMHG

## 2018-01-22 VITALS — SYSTOLIC BLOOD PRESSURE: 143 MMHG | DIASTOLIC BLOOD PRESSURE: 78 MMHG

## 2018-01-22 VITALS — SYSTOLIC BLOOD PRESSURE: 116 MMHG | DIASTOLIC BLOOD PRESSURE: 74 MMHG

## 2018-01-22 VITALS — SYSTOLIC BLOOD PRESSURE: 123 MMHG | DIASTOLIC BLOOD PRESSURE: 79 MMHG

## 2018-01-22 VITALS — DIASTOLIC BLOOD PRESSURE: 82 MMHG | SYSTOLIC BLOOD PRESSURE: 118 MMHG

## 2018-01-22 VITALS — SYSTOLIC BLOOD PRESSURE: 136 MMHG | DIASTOLIC BLOOD PRESSURE: 70 MMHG

## 2018-01-22 VITALS — SYSTOLIC BLOOD PRESSURE: 105 MMHG | DIASTOLIC BLOOD PRESSURE: 73 MMHG

## 2018-01-22 VITALS — SYSTOLIC BLOOD PRESSURE: 126 MMHG | DIASTOLIC BLOOD PRESSURE: 87 MMHG

## 2018-01-22 LAB
ADD MANUAL DIFF: NO
ANION GAP SERPL CALC-SCNC: 10 MMOL/L (ref 5–15)
BASOPHILS NFR BLD AUTO: 0.5 % (ref 0–2)
BUN SERPL-MCNC: 3 MG/DL (ref 7–18)
CALCIUM SERPL-MCNC: 9.1 MG/DL (ref 8.5–10.1)
CHLORIDE SERPL-SCNC: 101 MMOL/L (ref 98–107)
CO2 SERPL-SCNC: 28 MMOL/L (ref 21–32)
CREAT SERPL-MCNC: 0.7 MG/DL (ref 0.55–1.3)
EOSINOPHIL NFR BLD AUTO: 0.1 % (ref 0–3)
ERYTHROCYTE [DISTWIDTH] IN BLOOD BY AUTOMATED COUNT: 15.9 % (ref 11.6–14.8)
HCT VFR BLD CALC: 29.9 % (ref 37–47)
HGB BLD-MCNC: 9.7 G/DL (ref 12–16)
LYMPHOCYTES NFR BLD AUTO: 16.6 % (ref 20–45)
MCV RBC AUTO: 89 FL (ref 80–99)
MONOCYTES NFR BLD AUTO: 7 % (ref 1–10)
NEUTROPHILS NFR BLD AUTO: 75.8 % (ref 45–75)
PLATELET # BLD: 106 K/UL (ref 150–450)
POTASSIUM SERPL-SCNC: 2.9 MMOL/L (ref 3.5–5.1)
RBC # BLD AUTO: 3.36 M/UL (ref 4.2–5.4)
SODIUM SERPL-SCNC: 139 MMOL/L (ref 136–145)
WBC # BLD AUTO: 13.4 K/UL (ref 4.8–10.8)

## 2018-01-22 PROCEDURE — 0JPT0WZ REMOVAL OF TOTALLY IMPLANTABLE VASCULAR ACCESS DEVICE FROM TRUNK SUBCUTANEOUS TISSUE AND FASCIA, OPEN APPROACH: ICD-10-PCS

## 2018-01-22 PROCEDURE — 05PY03Z REMOVAL OF INFUSION DEVICE FROM UPPER VEIN, OPEN APPROACH: ICD-10-PCS

## 2018-01-22 RX ADMIN — Medication SCH MLS/HR: at 20:18

## 2018-01-22 RX ADMIN — HEPARIN SODIUM SCH UNITS: 5000 INJECTION INTRAVENOUS; SUBCUTANEOUS at 21:00

## 2018-01-22 RX ADMIN — DEXTROSE AND SODIUM CHLORIDE SCH MLS/HR: 5; .45 INJECTION, SOLUTION INTRAVENOUS at 13:19

## 2018-01-22 RX ADMIN — MORPHINE SULFATE PRN MG: 4 INJECTION INTRAVENOUS at 10:04

## 2018-01-22 RX ADMIN — Medication SCH MLS/HR: at 11:38

## 2018-01-22 RX ADMIN — MORPHINE SULFATE PRN MG: 4 INJECTION INTRAVENOUS at 17:35

## 2018-01-22 RX ADMIN — MORPHINE SULFATE PRN MG: 4 INJECTION INTRAVENOUS at 23:48

## 2018-01-22 RX ADMIN — HEPARIN SODIUM SCH UNITS: 5000 INJECTION INTRAVENOUS; SUBCUTANEOUS at 08:58

## 2018-01-22 RX ADMIN — SODIUM CHLORIDE SCH MLS/HR: 0.9 INJECTION INTRAVENOUS at 22:10

## 2018-01-22 RX ADMIN — PANTOPRAZOLE SODIUM SCH MG: 40 INJECTION, POWDER, FOR SOLUTION INTRAVENOUS at 08:55

## 2018-01-22 RX ADMIN — MORPHINE SULFATE PRN MG: 4 INJECTION INTRAVENOUS at 03:28

## 2018-01-22 RX ADMIN — DEXTROSE AND SODIUM CHLORIDE SCH MLS/HR: 5; .45 INJECTION, SOLUTION INTRAVENOUS at 00:18

## 2018-01-22 NOTE — OPERATIVE NOTE - DICTATED
DATE OF OPERATION:  01/22/2018



PREOPERATIVE DIAGNOSIS:  Infected left chest wall central venous catheter

with port.



POSTOPERATIVE DIAGNOSIS:  Infected left chest wall central venous catheter

with port.



OPERATION PERFORMED:  Removal of infected left chest wall port and central

venous catheter.



ATTENDING SURGEON:  Christoph Cason M.D.



ASSISTANT:  None.



ANESTHESIOLOGIST:  Florin Rivera M.D.



ANESTHESIA:  Moderate sedation and local.



ESTIMATED BLOOD LOSS:  Minimal.



IV FLUIDS:  Please see anesthesia records.



COMPLICATIONS:  None.



WOUND CLASSIFICATION:  Class 3.



ANTIBIOTICS:  The patient is on scheduled intravenous antibiotics as per ID

prior to entering the operating room.



SPECIMENS:  Catheter and port sent to pathology and purulent exudate sent

for cultures.



COUNTS:  Sponge and needle count correct x2.



ESTIMATED BLOOD LOSS:  Minimal.



COMPLICATIONS:  None.



INDICATIONS FOR PROCEDURE:  This is a 32-year-old female with history of

breast cancer, who is currently receiving chemotherapy.  The patient had a

left chest port placed in December 2017, which has been used for

chemotherapy since.  Unfortunately recently, the patient presented with

leukocytosis and was found to have an infected left chest wall port and

catheter.  Removal of port and catheter was indicated and recommended.

Risks, benefits, and alternatives were discussed with the patient in

detail.  The patient expressed understanding and consented to surgery.



OPERATIVE NOTE:  The patient was taken to the operating and made

comfortable in the bed.  Preoperative time-out was taken in identifying

the patient, procedure, operative staff, and surgical staff.  Prior to

entering the room, the patient had SCDs present.  Moderate sedation was

given and the patient was made comfortable.  Left chest wall was prepped

and draped in standard surgical fashion.  The prior incision of the port

placement was identified with a small keloid scar.  Excision of the keloid

scar was performed using an elliptical incision with a fresh #15

scalpel.  Once the scar was removed, subcutaneous tissue was reached.

Electrocautery and blunt dissection were used to identify the port.  The

port had a capsule around it and a fluid collection within the capsule.

Fluid collection was evacuated and fibrinous purulent exudate was taken

and sent to pathology for review.  The port was then removed from the

pocket and exteriorized.  Following this, pressure was held in the left IJ

and the catheter was removed.  The catheter and port were sent to

pathology for review.  Following a holding pressure and good hemostasis

being noted, the remainder of the capsule around the port was excised with

electrocautery and blunt dissection.  The cavity was then cauterized for

hemostasis.  Local anesthetic was infiltrated throughout the area as

necessary.  Once this was complete, the port cavity was reapproximated

using a 3-0 Vicryl suture.  Following this, the skin incision was

reapproximated in a two-layer fashion beginning with a 3-0 Vicryl

interrupted dermal sutures followed by 4-0 Monocryl subcuticular running

suture.  The prior insertion site and tunneling site in the left

clavicle/neck was identified and noted to be not healing with a small

ulceration and also potential site of infection and therefore, an

elliptical incision was used to excise this wound as well.  Once this was

completed, electrocautery was used to obtain hemostasis and a 4-0 Monocryl

subcuticular interrupted suture was used to reapproximate the skin.  The

wounds were then cleansed and following this, a dressing of Dermabond and

Steri-Strips were applied.  The patient tolerated the procedure well and

was taken to the postanesthetic care unit in stable condition.









  ______________________________________________

  Christoph Cason M.D.





DR:  JOSE

D:  01/22/2018 16:49

T:  01/22/2018 17:14

JOB#:  [id number]

CC:



KOFFI

## 2018-01-22 NOTE — INFECTIOUS DISEASES PROG NOTE
Assessment/Plan


Assessment/Plan


ASSESSMENT:  The patient is a 32-year-old female with,





Nausea, vomiting/diarrhea (probably due to chemo vs related to sepsis)


   -Abd US: Hepatosplenomegaly.Otherwise, unremarkable abdominal sonogram.


Sepsis 


Bacteremia Staph A


   Suggestive of  line infection


  CT: Implanted infusion port in the left chest. No definite fluid collection 

around the port. However, there appears to be occlusion of the left jugular 

vein as well as the left brachiocephalic vein with stranding around the 

brachiocephalic vein. This suggests thrombophlebitis.


  - BCx 2/4 GPC (ID and sensi pending)


Leukocytosis, worsening


Fever, improving


    -u/a wbc 5/10


    -Influenza neg


    -CXR no focal consolidation


Elevated LFTs, improving





- Right breast mastectomy, status post lumpectomy.


-. History of  and tubal ligation.


-. Status post Port-A-Cath placement.








PLAN:











continue  IV Vancomycin d# 3 


    -  SP  Zosyn # 4 


    - SP Ceftriaxone x1


Repeat 2 sets of Bcx (one peripheral, one from yessica cath)


2d Echo to evaluate for vegetation


f/u cx


Monitor CBC and CMP; trend LFTs and WBC








need Port cath removal





Subjective


Allergies:  


Coded Allergies:  


     No Known Allergies (Unverified , 10/12/17)


Subjective


 comfortable





Objective


Vital Signs





Last 24 Hour Vital Signs








  Date Time  Temp Pulse Resp B/P (MAP) Pulse Ox O2 Delivery O2 Flow Rate FiO2


 


18 08:00 98.1 90 18 125/87 98   


 


18 04:17 98.4 81 20 116/74 100   


 


18 00:23 97.9 86 19 124/78 100   


 


18 20:09 97.4 80 18 133/86 95   


 


18 16:45 98.8       


 


18 16:00 98.8 74 20 118/80 100 Room Air  


 


18 12:19 99.6 90 20 128/79 96 Room Air  








Height (Feet):  5


Height (Inches):  7.00


Weight (Pounds):  200


HEENT:  atraumatic


Respiratory/Chest:  no respiratory distress


Cardiovascular:  no gallop/murmur


Abdomen:  no scars





Microbiology








 Date/Time


Source Procedure


Growth Status


 


 


 18 10:05


Blood Blood Culture - Preliminary


Staphylococcus Aureus Resulted


 


 18 10:00


Blood Blood Culture - Preliminary


Staphylococcus Aureus Resulted





 18 11:59


Wound Gram Stain - Final Resulted


 


 18 11:59


Wound Wound Culture


Pending Resulted


 


 18 19:50


Nasopharynx Influenza Types A,B Antigen (PALMER) - Final Complete











Laboratory Tests








Test


  18


11:55 18


05:50 18


10:15


 


C-Reactive Protein,


Quantitative 21.4 mg/dL


(0.00-0.90)  H 


  


 


 


Vancomycin Level Trough


  5.3 ug/mL


(5.0-12.0) 


  Pending  


 


 


White Blood Count


  


  13.4 K/UL


(4.8-10.8)  H 


 


 


Red Blood Count


  


  3.36 M/UL


(4.20-5.40)  L 


 


 


Hemoglobin


  


  9.7 G/DL


(12.0-16.0)  L 


 


 


Hematocrit


  


  29.9 %


(37.0-47.0)  L 


 


 


Mean Corpuscular Volume  89 FL (80-99)   


 


Mean Corpuscular Hemoglobin


  


  29.0 PG


(27.0-31.0) 


 


 


Mean Corpuscular Hemoglobin


Concent 


  32.6 G/DL


(32.0-36.0) 


 


 


Red Cell Distribution Width


  


  15.9 %


(11.6-14.8)  H 


 


 


Platelet Count


  


  106 K/UL


(150-450)  L 


 


 


Mean Platelet Volume


  


  9.0 FL


(6.5-10.1) 


 


 


Neutrophils (%) (Auto)


  


  75.8 %


(45.0-75.0)  H 


 


 


Lymphocytes (%) (Auto)


  


  16.6 %


(20.0-45.0)  L 


 


 


Monocytes (%) (Auto)


  


  7.0 %


(1.0-10.0) 


 


 


Eosinophils (%) (Auto)


  


  0.1 %


(0.0-3.0) 


 


 


Basophils (%) (Auto)


  


  0.5 %


(0.0-2.0) 


 


 


Sodium Level


  


  139 MMOL/L


(136-145) 


 


 


Potassium Level


  


  2.9 MMOL/L


(3.5-5.1)  L 


 


 


Chloride Level


  


  101 MMOL/L


() 


 


 


Carbon Dioxide Level


  


  28 MMOL/L


(21-32) 


 


 


Anion Gap


  


  10 mmol/L


(5-15) 


 


 


Blood Urea Nitrogen


  


  3 mg/dL (7-18)


L 


 


 


Creatinine


  


  0.7 MG/DL


(0.55-1.30) 


 


 


Estimat Glomerular Filtration


Rate 


  > 60 mL/min


(>60) 


 


 


Glucose Level


  


  113 MG/DL


()  H 


 


 


Calcium Level


  


  9.1 MG/DL


(8.5-10.1) 


 











Current Medications








 Medications


  (Trade)  Dose


 Ordered  Sig/Indira


 Route


 PRN Reason  Start Time


 Stop Time Status Last Admin


Dose Admin


 


 Acetaminophen


  (Tylenol)  650 mg  Q4H  PRN


 ORAL


 fever  18 05:30


 18 05:29  18 11:46


 


 


 Acetaminophen/


 Hydrocodone Bitart


  (Norco 5/325)  1 tab  Q4H  PRN


 ORAL


 Moderate Pain (Pain Scale 4-6)  18 05:30


 18 05:29  18 08:57


 


 


 Al Hydroxide/Mg


 Hydroxide


  (Mylanta II)  30 ml  Q6H  PRN


 ORAL


 dyspepsia  18 05:30


 18 05:29   


 


 


 Dextrose


  (Dextrose 50%)    STAT  PRN


 IV


 Hypoglycemia  18 05:30


 18 05:29   


 


 


 Dextrose/Sodium


 Chloride  1,000 ml @ 


 75 mls/hr  [id number]


 IV


   18 05:19


 18 05:18  18 00:18


 


 


 Diphenhydramine


 HCl


  (Benadryl)  25 mg  Q6H  PRN


 ORAL


 Itching/Pruritis  18 05:30


 18 05:29   


 


 


 Folic Acid


  (Folate)  1 mg  DAILY


 ORAL


   18 09:00


 18 08:59  18 08:55


 


 


 Heparin Sodium


  (Porcine)


  (Heparin 5000


 units/ml)  5,000 units  EVERY 12  HOURS


 SUBQ


   18 09:00


 18 08:59   


 


 


 Iron Sucrose 100


 mg/Sodium Chloride  60 ml @ 


 240 mls/hr  BEDTIME


 IVPB


   18 21:00


 18 21:14   


 


 


 Lorazepam


  (Ativan 2mg/ml


 1ml)  1 mg  Q4H  PRN


 IV


 agitation  18 05:30


 18 05:29   


 


 


 Morphine Sulfate


  (Morphine


 Sulfate)  4 mg  Q4H  PRN


 IVP


 Severe Pain (Pain Scale 7-10)  18 19:45


 18 19:44  18 10:04


 


 


 Nitroglycerin


  (Ntg)  0.4 mg  Q5M X 3 DOSES PRN


 SL


 Prn Chest Pain  18 05:30


 18 05:29   


 


 


 Ondansetron HCl


  (Zofran)  4 mg  Q6H  PRN


 IVP


 Nausea & Vomiting  18 05:30


 18 05:29  18 05:40


 


 


 Pantoprazole


  (Protonix)  40 mg  DAILY


 IV


   18 09:00


 18 08:59  18 08:55


 


 


 Polyethylene


 Glycol


  (Miralax)  17 gm  HSPRN  PRN


 ORAL


 Constipation  18 05:30


 18 05:29   


 


 


 Promethazine HCl


  (Phenergan)  25 mg  Q8H  PRN


 ORAL


 Nausea & Vomiting  18 10:45


 18 10:44   


 


 


 Temazepam


  (Restoril)  15 mg  HSPRN  PRN


 ORAL


 Insomnia  18 05:30


 18 05:29  18 23:25


 


 


 Vancomycin HCl


  (Vanco rx to


 dose)  1 ea  DAILY  PRN


 MISC


 Per rx protocol  18 09:15


 18 09:14   


 


 


 Vancomycin HCl/


 Dextrose  250 ml @ 


 125 mls/hr  Q12HR@1100,2300


 IVPB


   18 23:00


 18 22:59  18 23:13


 

















ERIC GARCIA M.D. 2018 10:37

## 2018-01-22 NOTE — ANETHESIA PREOPERATIVE EVAL
Anesthesia Pre-op PMH/ROS


General


Date of Evaluation:  Jan 22, 2018


Time of Evaluation:  15:45


Anesthesiologist:  jenae


ASA Score:  ASA 3


Mallampati Score


Class I : Soft palate, uvula, fauces, pillars visible


Class II: Soft palate, uvula, fauces visible


Class III: Soft palate, base of uvula visible


Class IV: Only hard plate visible


Mallampati Classification:  Class II


Surgeon:  Angi


Diagnosis:  Infected Portacath


Surgical Procedure:  Portacath removal


Family History:  no anesthesia problems


Allergies:  


Coded Allergies:  


     No Known Allergies (Unverified , 10/12/17)





Past Medical History


Cardiovascular:  Denies: HTN, CAD, MI, valve dz, arrhythmia, other


Pulmonary:  Denies: asthma, COPD, [place], other


Gastrointestinal/Genitourinary:  Denies: GERD, CRI, ESRD, other


Neurologic/Psychiatric:  Denies: dementia, CVA, depression/anxiety, TIA, other


Endocrine:  Denies: DM, hypothyroidism, steroids, other


HEENT:  Denies: cataract (L), cataract (R), glaucoma, [place] (L), [place] (R), other


Hematology/Immune:  Denies: anemia, DVT, bleeding disorder, other


Musculoskeletal/Integumentary:  Denies: OA, RA, DJD, DDD, edema, other


PMH Narrative:


Breast CA


PSxH Narrative:


Breast surgery





Anesthesia Pre-op Phys. Exam


Physician Exam





Last Vital Signs








  Date Time  Temp Pulse Resp B/P (MAP) Pulse Ox O2 Delivery O2 Flow Rate FiO2


 


1/22/18 16:00 98.0 81 17 105/73 97   


 


1/21/18 16:00      Room Air  








Constitutional:  NAD


Neurologic:  CN 2-12 intact


Cardiovascular:  RRR, no M/R/G


Respiratory:  CTA


Gastrointestinal:  S/NT/ND





Airway Exam


Mallampati Score:  Class II


MO:  full


ROM:  full


Teeth:  intact





Anesthesia Pre-op A/P


Labs





Hematology








Test


  1/22/18


05:50


 


White Blood Count


  13.4 K/UL


(4.8-10.8)  H


 


Red Blood Count


  3.36 M/UL


(4.20-5.40)  L


 


Hemoglobin


  9.7 G/DL


(12.0-16.0)  L


 


Hematocrit


  29.9 %


(37.0-47.0)  L


 


Mean Corpuscular Volume 89 FL (80-99)  


 


Mean Corpuscular Hemoglobin


  29.0 PG


(27.0-31.0)


 


Mean Corpuscular Hemoglobin


Concent 32.6 G/DL


(32.0-36.0)


 


Red Cell Distribution Width


  15.9 %


(11.6-14.8)  H


 


Platelet Count


  106 K/UL


(150-450)  L


 


Mean Platelet Volume


  9.0 FL


(6.5-10.1)


 


Neutrophils (%) (Auto)


  75.8 %


(45.0-75.0)  H


 


Lymphocytes (%) (Auto)


  16.6 %


(20.0-45.0)  L


 


Monocytes (%) (Auto)


  7.0 %


(1.0-10.0)


 


Eosinophils (%) (Auto)


  0.1 %


(0.0-3.0)


 


Basophils (%) (Auto)


  0.5 %


(0.0-2.0)








Chemistry








Test


  1/22/18


05:50


 


Sodium Level


  139 MMOL/L


(136-145)


 


Potassium Level


  2.9 MMOL/L


(3.5-5.1)  L


 


Chloride Level


  101 MMOL/L


()


 


Carbon Dioxide Level


  28 MMOL/L


(21-32)


 


Anion Gap


  10 mmol/L


(5-15)


 


Blood Urea Nitrogen


  3 mg/dL (7-18)


L


 


Creatinine


  0.7 MG/DL


(0.55-1.30)


 


Estimat Glomerular Filtration


Rate > 60 mL/min


(>60)


 


Glucose Level


  113 MG/DL


()  H


 


Calcium Level


  9.1 MG/DL


(8.5-10.1)











Risk Assessment & Plan


Assessment:


Healthy female with breast CA now for removal of portacath secondary to 

infection


Plan:


MAC


Status Change Before Surgery:  No





Pre-Antibiotics


Drug:  Patient on KEESHA Estrella M.D. Jan 22, 2018 16:36

## 2018-01-22 NOTE — IMMEDIATE POST-OP EVALUATION
Immediate Post-Op Evalulation


Immediate Post-Op Evalulation


Procedure:  Removal or portacath


Date of Evaluation:  Jan 22, 2018


Time of Evaluation:  16:55


IV Fluids:  200


Blood Pressure Systolic:  125


Blood Pressure Diastolic:  90


Pulse Rate:  83


Respiratory Rate:  16


Temperature (Fahrenheit):  98.8


Pain Score (1-10):  0


Nausea:  No


Vomiting:  No


Complications


No complication


Patient Status:  awake, patent, none


Hydration Status:  adequate


Drug:  Patient is on KEESHA Estrella M.D. Jan 22, 2018 16:49

## 2018-01-22 NOTE — PULMONOLOGY PROGRESS NOTE
Assessment/Plan


Problems:  


(1) Sepsis


(2) Chemotherapy induced nausea and vomiting


(3) Breast CA


(4) Bloodstream infection due to port-a-cath


Assessment/Plan


improving


iv abx


check cultures


all ntoes reviewed


Festus cath needs to be removed.





Subjective


ROS Limited/Unobtainable:  No


Interval Events:  no new complains


Allergies:  


Coded Allergies:  


     No Known Allergies (Unverified , 10/12/17)





Objective





Last 24 Hour Vital Signs








  Date Time  Temp Pulse Resp B/P (MAP) Pulse Ox O2 Delivery O2 Flow Rate FiO2


 


1/22/18 12:00 97.6 74 19 123/79 97   


 


1/22/18 08:00 98.1 90 18 125/87 98   


 


1/22/18 04:17 98.4 81 20 116/74 100   


 


1/22/18 00:23 97.9 86 19 124/78 100   


 


1/21/18 20:09 97.4 80 18 133/86 95   


 


1/21/18 16:45 98.8       


 


1/21/18 16:00 98.8 74 20 118/80 100 Room Air  

















Intake and Output  


 


 1/21/18 1/22/18





 19:00 07:00


 


Intake Total 1900.0 ml 240 ml


 


Balance 1900.0 ml 240 ml


 


  


 


Intake Oral 900 ml 240 ml


 


IV Total 1000.0 ml 


 


# Voids 2 2








Objective


General Appearance:  no acute distress, other - A/A/O x 3 morbidly obese AA 

male in NAD


HEENT:  normocephalic, atraumatic, anicteric, mucous membranes moist


Respiratory/Chest:  lungs clear -with moderate air exchange  no respiratory 

distress, no accessory muscle use


Cardiovascular:  normal rate 


Abdomen:  normal bowel sounds, soft, non tender , obese


Extremities:  pedal pulses normal, ot


Neurologic/Psychiatric:  no motor/sensory deficits, alert, oriented x 3, 

responsive


Musculoskeletal:  normal muscle bulk





Microbiology








 Date/Time


Source Procedure


Growth Status


 


 


 1/20/18 10:05


Blood Blood Culture - Preliminary


Staphylococcus Aureus Resulted


 


 1/20/18 10:00


Blood Blood Culture - Preliminary


Staphylococcus Aureus Resulted





 1/20/18 11:59


Wound Gram Stain - Final Resulted


 


 1/20/18 11:59 Wound Culture - Preliminary


Staphylococcus Aureus Resulted


 


 1/19/18 19:50


Nasopharynx Influenza Types A,B Antigen ([facility]MER) - Final Complete








Laboratory Tests


1/22/18 05:50: 


White Blood Count 13.4H, Red Blood Count 3.36L, Hemoglobin 9.7L, Hematocrit 

29.9L, Mean Corpuscular Volume 89, Mean Corpuscular Hemoglobin 29.0, Mean 

Corpuscular Hemoglobin Concent 32.6, Red Cell Distribution Width 15.9H, 

Platelet Count 106L, Mean Platelet Volume 9.0, Neutrophils (%) (Auto) 75.8H, 

Lymphocytes (%) (Auto) 16.6L, Monocytes (%) (Auto) 7.0, Eosinophils (%) (Auto) 

0.1, Basophils (%) (Auto) 0.5, Sodium Level 139, Potassium Level 2.9L, Chloride 

Level 101, Carbon Dioxide Level 28, Anion Gap 10, Blood Urea Nitrogen 3L, 

Creatinine 0.7, Estimat Glomerular Filtration Rate > 60, Glucose Level 113H, 

Calcium Level 9.1


1/22/18 10:15: Vancomycin Level Trough 6.7





Current Medications








 Medications


  (Trade)  Dose


 Ordered  Sig/Indira


 Route


 PRN Reason  Start Time


 Stop Time Status Last Admin


Dose Admin


 


 Acetaminophen


  (Tylenol)  650 mg  Q4H  PRN


 ORAL


 fever  1/19/18 05:30


 2/18/18 05:29  1/19/18 11:46


 


 


 Acetaminophen/


 Hydrocodone Bitart


  (Norco 5/325)  1 tab  Q4H  PRN


 ORAL


 Moderate Pain (Pain Scale 4-6)  1/19/18 05:30


 1/26/18 05:29  1/22/18 08:57


 


 


 Al Hydroxide/Mg


 Hydroxide


  (Mylanta II)  30 ml  Q6H  PRN


 ORAL


 dyspepsia  1/19/18 05:30


 2/18/18 05:29   


 


 


 Dextrose


  (Dextrose 50%)    STAT  PRN


 IV


 Hypoglycemia  1/19/18 05:30


 2/18/18 05:29   


 


 


 Dextrose/Sodium


 Chloride  1,000 ml @ 


 75 mls/hr  [id number]


 IV


   1/19/18 05:19


 2/18/18 05:18  1/22/18 00:18


 


 


 Diphenhydramine


 HCl


  (Benadryl)  25 mg  Q6H  PRN


 ORAL


 Itching/Pruritis  1/19/18 05:30


 2/18/18 05:29   


 


 


 Folic Acid


  (Folate)  1 mg  DAILY


 ORAL


   1/22/18 09:00


 2/21/18 08:59  1/22/18 08:55


 


 


 Heparin Sodium


  (Porcine)


  (Heparin 5000


 units/ml)  5,000 units  EVERY 12  HOURS


 SUBQ


   1/19/18 09:00


 2/18/18 08:59   


 


 


 Iron Sucrose 100


 mg/Sodium Chloride  60 ml @ 


 240 mls/hr  BEDTIME


 IVPB


   1/22/18 21:00


 1/24/18 21:14   


 


 


 Lorazepam


  (Ativan 2mg/ml


 1ml)  1 mg  Q4H  PRN


 IV


 agitation  1/19/18 05:30


 1/26/18 05:29   


 


 


 Morphine Sulfate


  (Morphine


 Sulfate)  4 mg  Q4H  PRN


 IVP


 Severe Pain (Pain Scale 7-10)  1/20/18 19:45


 1/27/18 19:44  1/22/18 10:04


 


 


 Nitroglycerin


  (Ntg)  0.4 mg  Q5M X 3 DOSES PRN


 SL


 Prn Chest Pain  1/19/18 05:30


 2/18/18 05:29   


 


 


 Ondansetron HCl


  (Zofran)  4 mg  Q6H  PRN


 IVP


 Nausea & Vomiting  1/19/18 05:30


 2/18/18 05:29  1/22/18 11:51


 


 


 Pantoprazole


  (Protonix)  40 mg  DAILY


 IV


   1/19/18 09:00


 2/18/18 08:59  1/22/18 08:55


 


 


 Polyethylene


 Glycol


  (Miralax)  17 gm  HSPRN  PRN


 ORAL


 Constipation  1/19/18 05:30


 2/18/18 05:29   


 


 


 Promethazine HCl


  (Phenergan)  25 mg  Q8H  PRN


 ORAL


 Nausea & Vomiting  1/19/18 10:45


 2/18/18 10:44   


 


 


 Temazepam


  (Restoril)  15 mg  HSPRN  PRN


 ORAL


 Insomnia  1/19/18 05:30


 1/26/18 05:29  1/19/18 23:25


 


 


 Vancomycin HCl


  (Vanco rx to


 dose)  1 ea  DAILY  PRN


 MISC


 Per rx protocol  1/20/18 09:15


 2/19/18 09:14   


 


 


 Vancomycin HCl/


 Dextrose  250 ml @ 


 125 mls/hr  Q8HR@0330,1130,1930


 IVPB


   1/22/18 11:30


 1/25/18 22:59  1/22/18 11:38


 

















JOE PRITCHETT Jan 22, 2018 15:13

## 2018-01-22 NOTE — GI PROGRESS NOTE
Assessment/Plan


Problems:  


(1) Bloodstream infection due to port-a-cath


ICD Codes:  T80.211A - Bloodstream infection due to central venous catheter, 

initial encounter


SNOMED:  183295809


(2) Nausea & vomiting


ICD Codes:  R11.2 - Nausea with vomiting, unspecified


SNOMED:  65526515


(3) Breast CA


ICD Codes:  C50.919 - Malignant neoplasm of unspecified site of unspecified 

female breast


SNOMED:  089242307


(4) Abnormal LFTs


ICD Codes:  R94.5 - Abnormal results of liver function studies


SNOMED:  679358609


(5) Chemotherapy induced nausea and vomiting


ICD Codes:  R11.2 - Nausea with vomiting, unspecified; T45.1X5A - Adverse 

effect of antineoplastic and immunosuppressive drugs, initial encounter


SNOMED:  018839531


(6) Dehydration


ICD Codes:  E86.0 - Dehydration


SNOMED:  71635650


(7) Electrolyte imbalance


ICD Codes:  E87.8 - Other disorders of electrolyte and fluid balance, not 

elsewhere classified


SNOMED:  882485572


Status:  progressing


Status Narrative


Discussed with Dr. Marie.


Assessment/Plan


fu CT, see full report >>  suggests thrombophlebitis.


iron deficiency >> venofer





fu surgical recs


adv to regular diet


pain control


phenergan and zofran


fu labs





Subjective


Subjective


nausea improved


diarrhea improved





Objective





Last 24 Hour Vital Signs








  Date Time  Temp Pulse Resp B/P (MAP) Pulse Ox O2 Delivery O2 Flow Rate FiO2


 


1/22/18 12:00 97.6 74 19 123/79 97   


 


1/22/18 08:00 98.1 90 18 125/87 98   


 


1/22/18 04:17 98.4 81 20 116/74 100   


 


1/22/18 00:23 97.9 86 19 124/78 100   


 


1/21/18 20:09 97.4 80 18 133/86 95   


 


1/21/18 16:45 98.8       


 


1/21/18 16:00 98.8 74 20 118/80 100 Room Air  

















Intake and Output  


 


 1/21/18 1/22/18





 19:00 07:00


 


Intake Total 1900.0 ml 240 ml


 


Balance 1900.0 ml 240 ml


 


  


 


Intake Oral 900 ml 240 ml


 


IV Total 1000.0 ml 


 


# Voids 2 2











Laboratory Tests








Test


  1/22/18


05:50 1/22/18


10:15


 


White Blood Count


  13.4 K/UL


(4.8-10.8)  H 


 


 


Red Blood Count


  3.36 M/UL


(4.20-5.40)  L 


 


 


Hemoglobin


  9.7 G/DL


(12.0-16.0)  L 


 


 


Hematocrit


  29.9 %


(37.0-47.0)  L 


 


 


Mean Corpuscular Volume 89 FL (80-99)   


 


Mean Corpuscular Hemoglobin


  29.0 PG


(27.0-31.0) 


 


 


Mean Corpuscular Hemoglobin


Concent 32.6 G/DL


(32.0-36.0) 


 


 


Red Cell Distribution Width


  15.9 %


(11.6-14.8)  H 


 


 


Platelet Count


  106 K/UL


(150-450)  L 


 


 


Mean Platelet Volume


  9.0 FL


(6.5-10.1) 


 


 


Neutrophils (%) (Auto)


  75.8 %


(45.0-75.0)  H 


 


 


Lymphocytes (%) (Auto)


  16.6 %


(20.0-45.0)  L 


 


 


Monocytes (%) (Auto)


  7.0 %


(1.0-10.0) 


 


 


Eosinophils (%) (Auto)


  0.1 %


(0.0-3.0) 


 


 


Basophils (%) (Auto)


  0.5 %


(0.0-2.0) 


 


 


Sodium Level


  139 MMOL/L


(136-145) 


 


 


Potassium Level


  2.9 MMOL/L


(3.5-5.1)  L 


 


 


Chloride Level


  101 MMOL/L


() 


 


 


Carbon Dioxide Level


  28 MMOL/L


(21-32) 


 


 


Anion Gap


  10 mmol/L


(5-15) 


 


 


Blood Urea Nitrogen


  3 mg/dL (7-18)


L 


 


 


Creatinine


  0.7 MG/DL


(0.55-1.30) 


 


 


Estimat Glomerular Filtration


Rate > 60 mL/min


(>60) 


 


 


Glucose Level


  113 MG/DL


()  H 


 


 


Calcium Level


  9.1 MG/DL


(8.5-10.1) 


 


 


Vancomycin Level Trough


  


  6.7 ug/mL


(5.0-12.0)








Height (Feet):  5


Height (Inches):  7.00


Weight (Pounds):  200


General Appearance:  WD/WN, no apparent distress, alert, overweight


Cardiovascular:  normal rate


Respiratory/Chest:  normal breath sounds, no respiratory distress


Abdominal Exam:  normal bowel sounds, non tender, soft


Extremities:  normal range of motion, non-tender











Montanez,Maggi Richy N.P. Jan 22, 2018 15:45

## 2018-01-22 NOTE — BRIEF OPERATIVE NOTE
Immediate Post Operative Note


Operative Note


Pre-op Diagnosis:


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


Procedure:


removal of infected left chest wall venous port and catheter


Post-op Diagnosis:  same as pre-op


Surgeon:  wilfred


Anesthesiologist:  meka


Anesthesia:  local, moderate sedation


Specimen:  yes - catheter and port with cultures


Complications:  none


Condition:  stable


Fluids:  n/a


Drains:  none


Implant(s) used?:  No











Christoph Cason Jan 22, 2018 16:44

## 2018-01-22 NOTE — INTERNAL MED PROGRESS NOTE
Subjective


Date of Service:  Jan 22, 2018


Physician Name


Kylah Olson


Attending Physician


Dave Christine MD





Current Medications








 Medications


  (Trade)  Dose


 Ordered  Sig/Indira


 Route


 PRN Reason  Start Time


 Stop Time Status Last Admin


Dose Admin


 


 Acetaminophen


  (Tylenol)  650 mg  Q4H  PRN


 ORAL


 fever  1/19/18 05:30


 2/18/18 05:29  1/19/18 11:46


 


 


 Acetaminophen/


 Hydrocodone Bitart


  (Norco 5/325)  1 tab  Q4H  PRN


 ORAL


 Moderate Pain (Pain Scale 4-6)  1/19/18 05:30


 1/26/18 05:29  1/22/18 08:57


 


 


 Al Hydroxide/Mg


 Hydroxide


  (Mylanta II)  30 ml  Q6H  PRN


 ORAL


 dyspepsia  1/19/18 05:30


 2/18/18 05:29   


 


 


 Dextrose


  (Dextrose 50%)    STAT  PRN


 IV


 Hypoglycemia  1/19/18 05:30


 2/18/18 05:29   


 


 


 Dextrose/Sodium


 Chloride  1,000 ml @ 


 75 mls/hr  [id number]


 IV


   1/19/18 05:19


 2/18/18 05:18  1/22/18 00:18


 


 


 Diphenhydramine


 HCl


  (Benadryl)  25 mg  Q6H  PRN


 ORAL


 Itching/Pruritis  1/19/18 05:30


 2/18/18 05:29   


 


 


 Folic Acid


  (Folate)  1 mg  DAILY


 ORAL


   1/22/18 09:00


 2/21/18 08:59  1/22/18 08:55


 


 


 Heparin Sodium


  (Porcine)


  (Heparin 5000


 units/ml)  5,000 units  EVERY 12  HOURS


 SUBQ


   1/19/18 09:00


 2/18/18 08:59   


 


 


 Iron Sucrose 100


 mg/Sodium Chloride  60 ml @ 


 240 mls/hr  BEDTIME


 IVPB


   1/22/18 21:00


 1/24/18 21:14   


 


 


 Lorazepam


  (Ativan 2mg/ml


 1ml)  1 mg  Q4H  PRN


 IV


 agitation  1/19/18 05:30


 1/26/18 05:29   


 


 


 Morphine Sulfate


  (Morphine


 Sulfate)  4 mg  Q4H  PRN


 IVP


 Severe Pain (Pain Scale 7-10)  1/20/18 19:45


 1/27/18 19:44  1/22/18 10:04


 


 


 Nitroglycerin


  (Ntg)  0.4 mg  Q5M X 3 DOSES PRN


 SL


 Prn Chest Pain  1/19/18 05:30


 2/18/18 05:29   


 


 


 Ondansetron HCl


  (Zofran)  4 mg  Q6H  PRN


 IVP


 Nausea & Vomiting  1/19/18 05:30


 2/18/18 05:29  1/22/18 11:51


 


 


 Pantoprazole


  (Protonix)  40 mg  DAILY


 IV


   1/19/18 09:00


 2/18/18 08:59  1/22/18 08:55


 


 


 Polyethylene


 Glycol


  (Miralax)  17 gm  HSPRN  PRN


 ORAL


 Constipation  1/19/18 05:30


 2/18/18 05:29   


 


 


 Promethazine HCl


  (Phenergan)  25 mg  Q8H  PRN


 ORAL


 Nausea & Vomiting  1/19/18 10:45


 2/18/18 10:44   


 


 


 Temazepam


  (Restoril)  15 mg  HSPRN  PRN


 ORAL


 Insomnia  1/19/18 05:30


 1/26/18 05:29  1/19/18 23:25


 


 


 Vancomycin HCl


  (Vanco rx to


 dose)  1 ea  DAILY  PRN


 MISC


 Per rx protocol  1/20/18 09:15


 2/19/18 09:14   


 


 


 Vancomycin HCl/


 Dextrose  250 ml @ 


 125 mls/hr  Q8HR@0330,1130,1930


 IVPB


   1/22/18 11:30


 1/25/18 22:59  1/22/18 11:38


 








Allergies:  


Coded Allergies:  


     No Known Allergies (Unverified , 10/12/17)


ROS Limited/Unobtainable:  No


Constitutional:  Reports: no symptoms


HEENT:  Reports: no symptoms


Cardiovascular:  Reports: no symptoms


Respiratory:  Reports: no symptoms


Gastrointestinal/Abdominal:  Reports: no symptoms


Genitourinary:  Reports: no symptoms


Neurologic/Psychiatric:  Reports: no symptoms


Subjective


31 YO F admitted with nausea and vomiting.  Now sepsis.  Cover for Int Octavio-Dr Christine





Objective





Last Vital Signs








  Date Time  Temp Pulse Resp B/P (MAP) Pulse Ox O2 Delivery O2 Flow Rate FiO2


 


1/22/18 08:00 98.1 90 18 125/87 98   


 


1/21/18 16:00      Room Air  











Laboratory Tests








Test


  1/22/18


05:50 1/22/18


10:15


 


White Blood Count


  13.4 K/UL


(4.8-10.8)  H 


 


 


Red Blood Count


  3.36 M/UL


(4.20-5.40)  L 


 


 


Hemoglobin


  9.7 G/DL


(12.0-16.0)  L 


 


 


Hematocrit


  29.9 %


(37.0-47.0)  L 


 


 


Mean Corpuscular Volume 89 FL (80-99)   


 


Mean Corpuscular Hemoglobin


  29.0 PG


(27.0-31.0) 


 


 


Mean Corpuscular Hemoglobin


Concent 32.6 G/DL


(32.0-36.0) 


 


 


Red Cell Distribution Width


  15.9 %


(11.6-14.8)  H 


 


 


Platelet Count


  106 K/UL


(150-450)  L 


 


 


Mean Platelet Volume


  9.0 FL


(6.5-10.1) 


 


 


Neutrophils (%) (Auto)


  75.8 %


(45.0-75.0)  H 


 


 


Lymphocytes (%) (Auto)


  16.6 %


(20.0-45.0)  L 


 


 


Monocytes (%) (Auto)


  7.0 %


(1.0-10.0) 


 


 


Eosinophils (%) (Auto)


  0.1 %


(0.0-3.0) 


 


 


Basophils (%) (Auto)


  0.5 %


(0.0-2.0) 


 


 


Sodium Level


  139 MMOL/L


(136-145) 


 


 


Potassium Level


  2.9 MMOL/L


(3.5-5.1)  L 


 


 


Chloride Level


  101 MMOL/L


() 


 


 


Carbon Dioxide Level


  28 MMOL/L


(21-32) 


 


 


Anion Gap


  10 mmol/L


(5-15) 


 


 


Blood Urea Nitrogen


  3 mg/dL (7-18)


L 


 


 


Creatinine


  0.7 MG/DL


(0.55-1.30) 


 


 


Estimat Glomerular Filtration


Rate > 60 mL/min


(>60) 


 


 


Glucose Level


  113 MG/DL


()  H 


 


 


Calcium Level


  9.1 MG/DL


(8.5-10.1) 


 


 


Vancomycin Level Trough


  


  6.7 ug/mL


(5.0-12.0)











Microbiology








 Date/Time


Source Procedure


Growth Status


 


 


 1/20/18 10:05


Blood Blood Culture - Preliminary


Staphylococcus Aureus Resulted


 


 1/20/18 10:00


Blood Blood Culture - Preliminary


Staphylococcus Aureus Resulted





 1/20/18 11:59


Wound Gram Stain - Final Resulted


 


 1/20/18 11:59 Wound Culture - Preliminary


Staphylococcus Aureus Resulted


 


 1/19/18 19:50


Nasopharynx Influenza Types A,B Antigen ([facility]) - Final Complete

















Intake and Output  


 


 1/21/18 1/22/18





 19:00 07:00


 


Intake Total 1900.0 ml 240 ml


 


Balance 1900.0 ml 240 ml


 


  


 


Intake Oral 900 ml 240 ml


 


IV Total 1000.0 ml 


 


# Voids 2 2








Objective


General Appearance:  WD/WN, alert, moderate distress


EENT:  PERRL/EOMI, normal ENT inspection


Neck:  non-tender, normal alignment, supple, normal inspection


Cardiovascular:  normal peripheral pulses, normal rate, regular rhythm, no 

gallop/murmur, no JVD


Respiratory/Chest:  chest wall non-tender, lungs clear, normal breath sounds, 

no respiratory distress, no accessory muscle use


Abdomen:  normal bowel sounds, non tender, soft, no organomegaly, no mass


Extremities:  normal range of motion, non-tender


Neurologic:  CNs II-XII grossly normal, no motor/sensory deficits


Skin:  normal pigmentation, warm/dry





Assessment/Plan


Problem List:  


(1) Nausea & vomiting


Assessment & Plan:  continue zofran





(2) Leukocytosis


Assessment & Plan:  Due to sepsis.  Antibiotic per ID





(3) Bloodstream infection due to port-a-cath


Assessment & Plan:  See surgery consult reguarding removal port A Cath..  Start 

vanco per ID





(4) Sepsis


Assessment & Plan:  Staph aureus.  Continue vanco per ID





(5) UTI (urinary tract infection)


(6) Breast CA


Assessment & Plan:  Currently on chemotherapy





Status:  not improved











KYLAH OLSON Jan 22, 2018 12:03

## 2018-01-22 NOTE — IMMEDIATE POST-OP EVALUATION
Immediate Post-Op Evalulation


Immediate Post-Op Evalulation


Procedure:  Removal or portacath


Date of Evaluation:  Jan 22, 2018


Nausea:  No


Vomiting:  No


Complications


No complication


Patient Status:  awake, patent, none


Hydration Status:  adequate


Drug:  Patient on KEESHA Estrella M.D. Jan 22, 2018 16:37

## 2018-01-22 NOTE — PRE-PROCEDURE NOTE/ATTESTATION
Pre-Procedure Note/Attestation


Complete Prior to Procedure


Planned Procedure:  left


Procedure Narrative:


removal of infected left chest wall central venous port and catheter





Indications for Procedure


Pre-Operative Diagnosis:


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





Attestation


I attest that I discussed the nature of the procedure; its benefits; risks and 

complications; and alternatives (and the risks and benefits of such alternatives

), prior to the procedure, with the patient (or the patient's legal 

representative).





I attest that, if there was a reasonable possibility of needing a blood 

transfusion, the patient (or the patient's legal representative) was given the 

[facility] standardized written summary, pursuant 

to the [place] Blood Safety Act ([place] Health and Safety Code # 1645, as 

amended).





I attest that I re-evaluated the patient just prior to the surgery and that 

there has been no change in the patient's H&P, except as documented below:











Christoph Cason Jan 22, 2018 11:14

## 2018-01-22 NOTE — 48 HOUR POST ANESTHESIA EVAL
Post Anesthesia Evaluation


Procedure:  Removal or portacath


Date of Evaluation:  Jan 22, 2018


Time of Evaluation:  17:15


Blood Pressure Systolic:  127


0:  88


Pulse Rate:  85


Respiratory Rate:  14


O2 Sat by Pulse Oximetry:  100


Airway:  patent


Nausea:  No


Vomiting:  No


Pain Intensity:  1


Hydration Status:  adequate


Cardiopulmonary Status:


Stable


Mental Status/LOC:  patient returned to baseline


Follow-up Care/Observations:


As per surgery


Post-Anesthesia Complications:


No anesthetic complication


Follow-up care needed:  N/A











KEESHA HEDRICK M.D. Jan 22, 2018 16:50

## 2018-01-23 VITALS — SYSTOLIC BLOOD PRESSURE: 135 MMHG | DIASTOLIC BLOOD PRESSURE: 92 MMHG

## 2018-01-23 VITALS — DIASTOLIC BLOOD PRESSURE: 75 MMHG | SYSTOLIC BLOOD PRESSURE: 125 MMHG

## 2018-01-23 VITALS — SYSTOLIC BLOOD PRESSURE: 118 MMHG | DIASTOLIC BLOOD PRESSURE: 76 MMHG

## 2018-01-23 VITALS — SYSTOLIC BLOOD PRESSURE: 119 MMHG | DIASTOLIC BLOOD PRESSURE: 81 MMHG

## 2018-01-23 VITALS — DIASTOLIC BLOOD PRESSURE: 87 MMHG | SYSTOLIC BLOOD PRESSURE: 119 MMHG

## 2018-01-23 VITALS — SYSTOLIC BLOOD PRESSURE: 116 MMHG | DIASTOLIC BLOOD PRESSURE: 75 MMHG

## 2018-01-23 LAB
ADD MANUAL DIFF: NO
ALBUMIN SERPL-MCNC: 2.7 G/DL (ref 3.4–5)
ALBUMIN/GLOB SERPL: 0.6 {RATIO} (ref 1–2.7)
ALP SERPL-CCNC: 191 U/L (ref 46–116)
ALT SERPL-CCNC: 103 U/L (ref 12–78)
ANION GAP SERPL CALC-SCNC: 10 MMOL/L (ref 5–15)
ANION GAP SERPL CALC-SCNC: 8 MMOL/L (ref 5–15)
APTT BLD: 28 SEC (ref 23–33)
AST SERPL-CCNC: 53 U/L (ref 15–37)
BASOPHILS NFR BLD AUTO: 0.6 % (ref 0–2)
BILIRUB SERPL-MCNC: 0.5 MG/DL (ref 0.2–1)
BUN SERPL-MCNC: 3 MG/DL (ref 7–18)
BUN SERPL-MCNC: 3 MG/DL (ref 7–18)
CALCIUM SERPL-MCNC: 8.8 MG/DL (ref 8.5–10.1)
CALCIUM SERPL-MCNC: 9 MG/DL (ref 8.5–10.1)
CHLORIDE SERPL-SCNC: 100 MMOL/L (ref 98–107)
CHLORIDE SERPL-SCNC: 100 MMOL/L (ref 98–107)
CO2 SERPL-SCNC: 27 MMOL/L (ref 21–32)
CO2 SERPL-SCNC: 29 MMOL/L (ref 21–32)
CREAT SERPL-MCNC: 0.7 MG/DL (ref 0.55–1.3)
CREAT SERPL-MCNC: 0.8 MG/DL (ref 0.55–1.3)
EOSINOPHIL NFR BLD AUTO: 0.2 % (ref 0–3)
ERYTHROCYTE [DISTWIDTH] IN BLOOD BY AUTOMATED COUNT: 16.1 % (ref 11.6–14.8)
GLOBULIN SER-MCNC: 4.4 G/DL
HCT VFR BLD CALC: 27.7 % (ref 37–47)
HGB BLD-MCNC: 9.4 G/DL (ref 12–16)
INR PPP: 1.1 (ref 0.9–1.1)
LYMPHOCYTES NFR BLD AUTO: 17.7 % (ref 20–45)
MCV RBC AUTO: 88 FL (ref 80–99)
MONOCYTES NFR BLD AUTO: 5.8 % (ref 1–10)
NEUTROPHILS NFR BLD AUTO: 75.7 % (ref 45–75)
PLATELET # BLD: 123 K/UL (ref 150–450)
POTASSIUM SERPL-SCNC: 3.1 MMOL/L (ref 3.5–5.1)
POTASSIUM SERPL-SCNC: 3.1 MMOL/L (ref 3.5–5.1)
RBC # BLD AUTO: 3.15 M/UL (ref 4.2–5.4)
SODIUM SERPL-SCNC: 137 MMOL/L (ref 136–145)
SODIUM SERPL-SCNC: 137 MMOL/L (ref 136–145)
WBC # BLD AUTO: 13.3 K/UL (ref 4.8–10.8)

## 2018-01-23 RX ADMIN — SODIUM CHLORIDE SCH MLS/HR: 0.9 INJECTION INTRAVENOUS at 20:26

## 2018-01-23 RX ADMIN — PANTOPRAZOLE SODIUM SCH MG: 40 INJECTION, POWDER, FOR SOLUTION INTRAVENOUS at 09:19

## 2018-01-23 RX ADMIN — MORPHINE SULFATE PRN MG: 4 INJECTION INTRAVENOUS at 09:20

## 2018-01-23 RX ADMIN — MORPHINE SULFATE PRN MG: 4 INJECTION INTRAVENOUS at 23:55

## 2018-01-23 RX ADMIN — Medication SCH MLS/HR: at 04:16

## 2018-01-23 RX ADMIN — MORPHINE SULFATE PRN MG: 4 INJECTION INTRAVENOUS at 18:38

## 2018-01-23 RX ADMIN — VANCOMYCIN HCL-SODIUM CHLORIDE IV SOLN 1.25 GM/250ML-0.9% SCH MLS/HR: 1.25-0.9/25 SOLUTION at 23:55

## 2018-01-23 RX ADMIN — HEPARIN SODIUM SCH UNITS: 5000 INJECTION INTRAVENOUS; SUBCUTANEOUS at 09:00

## 2018-01-23 RX ADMIN — VANCOMYCIN HCL-SODIUM CHLORIDE IV SOLN 1.25 GM/250ML-0.9% SCH MLS/HR: 1.25-0.9/25 SOLUTION at 15:23

## 2018-01-23 RX ADMIN — MORPHINE SULFATE PRN MG: 4 INJECTION INTRAVENOUS at 05:17

## 2018-01-23 RX ADMIN — MORPHINE SULFATE PRN MG: 4 INJECTION INTRAVENOUS at 13:38

## 2018-01-23 NOTE — INFECTIOUS DISEASES PROG NOTE
Assessment/Plan


Assessment/Plan


ASSESSMENT:  The patient is a 32-year-old female with,





Nausea, vomiting/diarrhea (probably due to chemo vs related to sepsis)


   -Abd US: Hepatosplenomegaly.Otherwise, unremarkable abdominal sonogram.


Sepsis 


Bacteremia Staph A /  line infection, / septic thrombophlebitis 


  wnd Cx ( over the port) : MSSA 


  SP removal or portacath  


  CT: Implanted infusion port in the left chest. No definite fluid collection 

around the port. However, there appears to be occlusion of the left jugular 

vein as well as the left brachiocephalic vein with stranding around the 

brachiocephalic vein. This suggests thrombophlebitis.


  - BCx 2/4 GPC (ID and sensi pending)


Leukocytosis, worsening


Fever, improving


    -u/a wbc /10


    -Influenza neg


    -CXR no focal consolidation


Elevated LFTs, improving





- Right breast mastectomy, status post lumpectomy.


-. History of  and tubal ligation.


-. Status post Port-A-Cath placement.








PLAN:











continue  IV Vancomycin d# /  ( need long term AB, 2/2 septic 

thrombophlebitis )


    -  SP  Zosyn # 4 


    - SP Ceftriaxone x1


2d Echo to evaluate for vegetation


f/u cx ( Bl ) 


Monitor CBC and CMP;





Subjective


Allergies:  


Coded Allergies:  


     No Known Allergies (Unverified , 10/12/17)


Subjective








SP removal or portacath





Objective


Vital Signs





Last 24 Hour Vital Signs








  Date Time  Temp Pulse Resp B/P (MAP) Pulse Ox O2 Delivery O2 Flow Rate FiO2


 


18 08:00 97.8 84 18 119/87 95   


 


18 04:43 97.9 94 18 118/76 96   


 


18 00:51 98.2 100 19 116/75 100   


 


18 20:17 98.2 90 18 143/78 98   


 


18 17:10 98.6 88 24 118/82 96 Room Air  


 


18 16:55  93 18 126/87 98 Room Air  


 


18 16:50  87 20 136/70 96 Room Air  


 


18 16:50  85 14  100   


 


18 16:49  83 16     


 


18 16:45 98.8 89 21 125/82 95 Room Air  


 


18 12:00 97.6 74 19 123/79 97   








Height (Feet):  5


Height (Inches):  7.00


Weight (Pounds):  200


HEENT:  mucous membranes moist


Respiratory/Chest:  normal breath sounds


Cardiovascular:  normal rate





Microbiology








 Date/Time


Source Procedure


Growth Status


 


 


 18 14:22


Blood Blood Culture - Preliminary


NO GROWTH AFTER 24 HOURS Resulted


 


 18 14:18


Blood Blood Culture - Preliminary


NO GROWTH AFTER 24 HOURS Resulted





 18 11:59


Wound Gram Stain - Final Resulted


 


 18 11:59 Wound Culture - Preliminary


Staphylococcus Aureus Resulted











Laboratory Tests








Test


  18


04:40


 


White Blood Count


  13.3 K/UL


(4.8-10.8)  H


 


Red Blood Count


  3.15 M/UL


(4.20-5.40)  L


 


Hemoglobin


  9.4 G/DL


(12.0-16.0)  L


 


Hematocrit


  27.7 %


(37.0-47.0)  L


 


Mean Corpuscular Volume 88 FL (80-99)  


 


Mean Corpuscular Hemoglobin


  29.8 PG


(27.0-31.0)


 


Mean Corpuscular Hemoglobin


Concent 33.9 G/DL


(32.0-36.0)


 


Red Cell Distribution Width


  16.1 %


(11.6-14.8)  H


 


Platelet Count


  123 K/UL


(150-450)  L


 


Mean Platelet Volume


  8.8 FL


(6.5-10.1)


 


Neutrophils (%) (Auto)


  75.7 %


(45.0-75.0)  H


 


Lymphocytes (%) (Auto)


  17.7 %


(20.0-45.0)  L


 


Monocytes (%) (Auto)


  5.8 %


(1.0-10.0)


 


Eosinophils (%) (Auto)


  0.2 %


(0.0-3.0)


 


Basophils (%) (Auto)


  0.6 %


(0.0-2.0)


 


Prothrombin Time


  12.0 SEC


(9.30-11.50)  H


 


Prothromb Time International


Ratio 1.1 (0.9-1.1)  


 


 


Activated Partial


Thromboplast Time 28 SEC (23-33)


 


 


Sodium Level


  137 MMOL/L


(136-145)


 


Potassium Level


  3.1 MMOL/L


(3.5-5.1)  L


 


Chloride Level


  100 MMOL/L


()


 


Carbon Dioxide Level


  29 MMOL/L


(21-32)


 


Anion Gap


  8 mmol/L


(5-15)


 


Blood Urea Nitrogen


  3 mg/dL (7-18)


L


 


Creatinine


  0.8 MG/DL


(0.55-1.30)


 


Estimat Glomerular Filtration


Rate > 60 mL/min


(>60)


 


Glucose Level


  125 MG/DL


()  H


 


Calcium Level


  8.8 MG/DL


(8.5-10.1)











Current Medications








 Medications


  (Trade)  Dose


 Ordered  Sig/Indira


 Route


 PRN Reason  Start Time


 Stop Time Status Last Admin


Dose Admin


 


 Acetaminophen


  (Tylenol)  650 mg  Q4H  PRN


 ORAL


 fever  18 05:30


 18 05:29  18 11:46


 


 


 Acetaminophen/


 Hydrocodone Bitart


  (Norco 5/325)  1 tab  Q4H  PRN


 ORAL


 Moderate Pain (Pain Scale 4-6)  18 05:30


 18 05:29  18 08:57


 


 


 Al Hydroxide/Mg


 Hydroxide


  (Mylanta II)  30 ml  Q6H  PRN


 ORAL


 dyspepsia  18 05:30


 18 05:29   


 


 


 Dextrose


  (Dextrose 50%)    STAT  PRN


 IV


 Hypoglycemia  18 05:30


 18 05:29   


 


 


 Diphenhydramine


 HCl


  (Benadryl)  25 mg  Q6H  PRN


 ORAL


 Itching/Pruritis  18 05:30


 18 05:29   


 


 


 Folic Acid


  (Folate)  1 mg  DAILY


 ORAL


   18 09:00


 18 08:59  18 09:19


 


 


 Iron Sucrose 100


 mg/Sodium Chloride  60 ml @ 


 240 mls/hr  BEDTIME


 IVPB


   18 21:00


 18 21:14  18 22:10


 


 


 Lorazepam


  (Ativan 2mg/ml


 1ml)  1 mg  Q4H  PRN


 IV


 agitation  18 05:30


 18 05:29   


 


 


 Morphine Sulfate


  (Morphine


 Sulfate)  4 mg  Q4H  PRN


 IVP


 Severe Pain (Pain Scale 7-10)  18 19:45


 18 19:44  18 09:20


 


 


 Nitroglycerin


  (Ntg)  0.4 mg  Q5M X 3 DOSES PRN


 SL


 Prn Chest Pain  18 05:30


 18 05:29   


 


 


 Ondansetron HCl


  (Zofran)  4 mg  Q6H  PRN


 IVP


 Nausea & Vomiting  18 05:30


 18 05:29  18 20:18


 


 


 Pantoprazole


  (Protonix)  40 mg  DAILY


 IV


   18 09:00


 18 08:59  18 09:19


 


 


 Polyethylene


 Glycol


  (Miralax)  17 gm  HSPRN  PRN


 ORAL


 Constipation  18 05:30


 18 05:29   


 


 


 Promethazine HCl


  (Phenergan)  25 mg  Q8H  PRN


 ORAL


 Nausea & Vomiting  18 10:45


 18 10:44   


 


 


 Temazepam


  (Restoril)  15 mg  HSPRN  PRN


 ORAL


 Insomnia  18 05:30


 18 05:29  18 20:18


 


 


 Vancomycin HCl


  (Vanco rx to


 dose)  1 ea  DAILY  PRN


 MISC


 Per rx protocol  18 09:15


 18 09:14   


 


 


 Vancomycin HCl/


 Dextrose  250 ml @ 


 125 mls/hr  Q8HR@0330,1130,1930


 IVPB


   18 11:30


 18 22:59  18 04:16


 

















ERIC GARCIA M.D. 2018 10:28

## 2018-01-23 NOTE — GENERAL PROGRESS NOTE
Progress Note


Progress Note


Surgery:





POD #1 s/p removal of infect left chest wall central venous catheter and port.  


doing well.  no issues


afebrile, HD stable, labs improved. 


wound c/d/i





okay to shower


cont abx


will need picc line before next chemo











Christoph Cason Jan 23, 2018 13:22

## 2018-01-23 NOTE — GI PROGRESS NOTE
Assessment/Plan


Problems:  


(1) Bloodstream infection due to port-a-cath


ICD Codes:  T80.211A - Bloodstream infection due to central venous catheter, 

initial encounter


SNOMED:  202980973


(2) Nausea & vomiting


ICD Codes:  R11.2 - Nausea with vomiting, unspecified


SNOMED:  61351120


(3) Breast CA


ICD Codes:  C50.919 - Malignant neoplasm of unspecified site of unspecified 

female breast


SNOMED:  894982547


(4) Abnormal LFTs


ICD Codes:  R94.5 - Abnormal results of liver function studies


SNOMED:  761100112


(5) Chemotherapy induced nausea and vomiting


ICD Codes:  R11.2 - Nausea with vomiting, unspecified; T45.1X5A - Adverse 

effect of antineoplastic and immunosuppressive drugs, initial encounter


SNOMED:  637053446


(6) Dehydration


ICD Codes:  E86.0 - Dehydration


SNOMED:  58181401


(7) Electrolyte imbalance


ICD Codes:  E87.8 - Other disorders of electrolyte and fluid balance, not 

elsewhere classified


SNOMED:  007048005


Status:  progressing


Status Narrative


Discussed with Dr. Marie.


Assessment/Plan


CT reviewed, see full report >>  suggests thrombophlebitis.


iron deficiency >> venofer





fu surgical recs


adv to regular diet, tolerating


pain control


Phenergan and zofran


fu labs





Subjective


Gastrointestinal/Abdominal:  Reports: no symptoms


Subjective


N/V/D resolved





Objective





Last 24 Hour Vital Signs








  Date Time  Temp Pulse Resp B/P (MAP) Pulse Ox O2 Delivery O2 Flow Rate FiO2


 


1/23/18 12:00 97.0  18 135/92 97   


 


1/23/18 08:00 97.8 84 18 119/87 95   


 


1/23/18 04:43 97.9 94 18 118/76 96   


 


1/23/18 00:51 98.2 100 19 116/75 100   


 


1/22/18 20:17 98.2 90 18 143/78 98   


 


1/22/18 17:10 98.6 88 24 118/82 96 Room Air  


 


1/22/18 16:55  93 18 126/87 98 Room Air  


 


1/22/18 16:50  87 20 136/70 96 Room Air  


 


1/22/18 16:50  85 14  100   


 


1/22/18 16:49  83 16     


 


1/22/18 16:45 98.8 89 21 125/82 95 Room Air  

















Intake and Output  


 


 1/22/18 1/23/18





 19:00 07:00


 


Intake Total 1000 ml 


 


Balance 1000 ml 


 


  


 


Intake Oral 250 ml 


 


IV Total 750 ml 


 


# Voids 1 2











Laboratory Tests








Test


  1/23/18


04:40 1/23/18


12:00


 


White Blood Count


  13.3 K/UL


(4.8-10.8)  H 


 


 


Red Blood Count


  3.15 M/UL


(4.20-5.40)  L 


 


 


Hemoglobin


  9.4 G/DL


(12.0-16.0)  L 


 


 


Hematocrit


  27.7 %


(37.0-47.0)  L 


 


 


Mean Corpuscular Volume 88 FL (80-99)   


 


Mean Corpuscular Hemoglobin


  29.8 PG


(27.0-31.0) 


 


 


Mean Corpuscular Hemoglobin


Concent 33.9 G/DL


(32.0-36.0) 


 


 


Red Cell Distribution Width


  16.1 %


(11.6-14.8)  H 


 


 


Platelet Count


  123 K/UL


(150-450)  L 


 


 


Mean Platelet Volume


  8.8 FL


(6.5-10.1) 


 


 


Neutrophils (%) (Auto)


  75.7 %


(45.0-75.0)  H 


 


 


Lymphocytes (%) (Auto)


  17.7 %


(20.0-45.0)  L 


 


 


Monocytes (%) (Auto)


  5.8 %


(1.0-10.0) 


 


 


Eosinophils (%) (Auto)


  0.2 %


(0.0-3.0) 


 


 


Basophils (%) (Auto)


  0.6 %


(0.0-2.0) 


 


 


Prothrombin Time


  12.0 SEC


(9.30-11.50)  H 


 


 


Prothromb Time International


Ratio 1.1 (0.9-1.1)  


  


 


 


Activated Partial


Thromboplast Time 28 SEC (23-33)


  


 


 


Sodium Level


  137 MMOL/L


(136-145) 


 


 


Potassium Level


  3.1 MMOL/L


(3.5-5.1)  L 


 


 


Chloride Level


  100 MMOL/L


() 


 


 


Carbon Dioxide Level


  29 MMOL/L


(21-32) 


 


 


Anion Gap


  8 mmol/L


(5-15) 


 


 


Blood Urea Nitrogen


  3 mg/dL (7-18)


L 


 


 


Creatinine


  0.8 MG/DL


(0.55-1.30) 


 


 


Estimat Glomerular Filtration


Rate > 60 mL/min


(>60) 


 


 


Glucose Level


  125 MG/DL


()  H 


 


 


Calcium Level


  8.8 MG/DL


(8.5-10.1) 


 


 


Total Bilirubin


  0.5 MG/DL


(0.2-1.0) 


 


 


Aspartate Amino Transf


(AST/SGOT) 53 U/L (15-37)


H 


 


 


Alanine Aminotransferase


(ALT/SGPT) 103 U/L


(12-78)  H 


 


 


Alkaline Phosphatase


  191 U/L


()  H 


 


 


Total Protein


  7.1 G/DL


(6.4-8.2) 


 


 


Albumin


  2.7 G/DL


(3.4-5.0)  L 


 


 


Globulin 4.4 g/dL   


 


Albumin/Globulin Ratio


  0.6 (1.0-2.7)


L 


 


 


Vancomycin Level Trough


  64.0 ug/mL


(5.0-12.0)  H 10.8 ug/mL


(5.0-12.0)











Microbiology








 Date/Time


Source Procedure


Growth Status


 


 


 1/22/18 16:35


Chest Gram Stain - Final Resulted


 


 1/22/18 16:35


Chest Surgical Biopsy Culture


Pending Resulted








Height (Feet):  5


Height (Inches):  7.00


Weight (Pounds):  200


General Appearance:  WD/WN, no apparent distress, alert, overweight


Cardiovascular:  normal rate


Respiratory/Chest:  normal breath sounds, no respiratory distress


Abdominal Exam:  normal bowel sounds, non tender, soft


Extremities:  normal range of motion, non-tender











Maggi Montanez N.P. Jan 23, 2018 15:09

## 2018-01-23 NOTE — INTERNAL MED PROGRESS NOTE
Subjective


Date of Service:  Jan 23, 2018


Physician Name


Kylah Valdovinos


Attending Physician


Dave Christine MD





Current Medications








 Medications


  (Trade)  Dose


 Ordered  Sig/Indira


 Route


 PRN Reason  Start Time


 Stop Time Status Last Admin


Dose Admin


 


 Acetaminophen


  (Tylenol)  650 mg  Q4H  PRN


 ORAL


 fever  1/19/18 05:30


 2/18/18 05:29  1/19/18 11:46


 


 


 Acetaminophen/


 Hydrocodone Bitart


  (Norco 5/325)  1 tab  Q4H  PRN


 ORAL


 Moderate Pain (Pain Scale 4-6)  1/19/18 05:30


 1/26/18 05:29  1/22/18 08:57


 


 


 Al Hydroxide/Mg


 Hydroxide


  (Mylanta II)  30 ml  Q6H  PRN


 ORAL


 dyspepsia  1/19/18 05:30


 2/18/18 05:29   


 


 


 Dextrose


  (Dextrose 50%)    STAT  PRN


 IV


 Hypoglycemia  1/19/18 05:30


 2/18/18 05:29   


 


 


 Diphenhydramine


 HCl


  (Benadryl)  25 mg  Q6H  PRN


 ORAL


 Itching/Pruritis  1/19/18 05:30


 2/18/18 05:29   


 


 


 Folic Acid


  (Folate)  1 mg  DAILY


 ORAL


   1/22/18 09:00


 2/21/18 08:59  1/23/18 09:19


 


 


 Iron Sucrose 100


 mg/Sodium Chloride  60 ml @ 


 240 mls/hr  BEDTIME


 IVPB


   1/22/18 21:00


 1/24/18 21:14  1/22/18 22:10


 


 


 Lorazepam


  (Ativan 2mg/ml


 1ml)  1 mg  Q4H  PRN


 IV


 agitation  1/19/18 05:30


 1/26/18 05:29   


 


 


 Morphine Sulfate


  (Morphine


 Sulfate)  4 mg  Q4H  PRN


 IVP


 Severe Pain (Pain Scale 7-10)  1/20/18 19:45


 1/27/18 19:44  1/23/18 13:38


 


 


 Nitroglycerin


  (Ntg)  0.4 mg  Q5M X 3 DOSES PRN


 SL


 Prn Chest Pain  1/19/18 05:30


 2/18/18 05:29   


 


 


 Ondansetron HCl


  (Zofran)  4 mg  Q6H  PRN


 IVP


 Nausea & Vomiting  1/19/18 05:30


 2/18/18 05:29  1/22/18 20:18


 


 


 Pantoprazole


  (Protonix)  40 mg  DAILY


 IV


   1/19/18 09:00


 2/18/18 08:59  1/23/18 09:19


 


 


 Polyethylene


 Glycol


  (Miralax)  17 gm  HSPRN  PRN


 ORAL


 Constipation  1/19/18 05:30


 2/18/18 05:29   


 


 


 Promethazine HCl


  (Phenergan)  25 mg  Q8H  PRN


 ORAL


 Nausea & Vomiting  1/19/18 10:45


 2/18/18 10:44   


 


 


 Temazepam


  (Restoril)  15 mg  HSPRN  PRN


 ORAL


 Insomnia  1/19/18 05:30


 1/26/18 05:29  1/22/18 20:18


 


 


 Vancomycin HCl


  (Vanco rx to


 dose)  1 ea  DAILY  PRN


 MISC


 Per rx protocol  1/20/18 09:15


 2/19/18 09:14   


 


 


 Vancomycin HCl/


 Dextrose  250 ml @ 


 166.667


 mls/hr  Q6HR


 IVPB


   1/23/18 15:00


 1/28/18 14:59  1/23/18 15:23


 








Allergies:  


Coded Allergies:  


     No Known Allergies (Unverified , 10/12/17)


ROS Limited/Unobtainable:  No


Constitutional:  Reports: no symptoms


HEENT:  Reports: no symptoms


Cardiovascular:  Reports: no symptoms


Respiratory:  Reports: no symptoms


Gastrointestinal/Abdominal:  Reports: no symptoms


Genitourinary:  Reports: no symptoms


Neurologic/Psychiatric:  Reports: no symptoms


Subjective


33 YO F admitted with nausea and vomiting.  Now sepsis.  S/P Port A Cath 

removal 1/22/17.  Cover for Int Octavio-Dr Christine





Objective





Last Vital Signs








  Date Time  Temp Pulse Resp B/P (MAP) Pulse Ox O2 Delivery O2 Flow Rate FiO2


 


1/23/18 16:00 97.5 87 18 125/75 96   


 


1/22/18 17:10      Room Air  











Laboratory Tests








Test


  1/23/18


04:40 1/23/18


12:00


 


White Blood Count


  13.3 K/UL


(4.8-10.8)  H 


 


 


Red Blood Count


  3.15 M/UL


(4.20-5.40)  L 


 


 


Hemoglobin


  9.4 G/DL


(12.0-16.0)  L 


 


 


Hematocrit


  27.7 %


(37.0-47.0)  L 


 


 


Mean Corpuscular Volume 88 FL (80-99)   


 


Mean Corpuscular Hemoglobin


  29.8 PG


(27.0-31.0) 


 


 


Mean Corpuscular Hemoglobin


Concent 33.9 G/DL


(32.0-36.0) 


 


 


Red Cell Distribution Width


  16.1 %


(11.6-14.8)  H 


 


 


Platelet Count


  123 K/UL


(150-450)  L 


 


 


Mean Platelet Volume


  8.8 FL


(6.5-10.1) 


 


 


Neutrophils (%) (Auto)


  75.7 %


(45.0-75.0)  H 


 


 


Lymphocytes (%) (Auto)


  17.7 %


(20.0-45.0)  L 


 


 


Monocytes (%) (Auto)


  5.8 %


(1.0-10.0) 


 


 


Eosinophils (%) (Auto)


  0.2 %


(0.0-3.0) 


 


 


Basophils (%) (Auto)


  0.6 %


(0.0-2.0) 


 


 


Prothrombin Time


  12.0 SEC


(9.30-11.50)  H 


 


 


Prothromb Time International


Ratio 1.1 (0.9-1.1)  


  


 


 


Activated Partial


Thromboplast Time 28 SEC (23-33)


  


 


 


Sodium Level


  137 MMOL/L


(136-145) 


 


 


Potassium Level


  3.1 MMOL/L


(3.5-5.1)  L 


 


 


Chloride Level


  100 MMOL/L


() 


 


 


Carbon Dioxide Level


  29 MMOL/L


(21-32) 


 


 


Anion Gap


  8 mmol/L


(5-15) 


 


 


Blood Urea Nitrogen


  3 mg/dL (7-18)


L 


 


 


Creatinine


  0.8 MG/DL


(0.55-1.30) 


 


 


Estimat Glomerular Filtration


Rate > 60 mL/min


(>60) 


 


 


Glucose Level


  125 MG/DL


()  H 


 


 


Calcium Level


  8.8 MG/DL


(8.5-10.1) 


 


 


Total Bilirubin


  0.5 MG/DL


(0.2-1.0) 


 


 


Aspartate Amino Transf


(AST/SGOT) 53 U/L (15-37)


H 


 


 


Alanine Aminotransferase


(ALT/SGPT) 103 U/L


(12-78)  H 


 


 


Alkaline Phosphatase


  191 U/L


()  H 


 


 


Total Protein


  7.1 G/DL


(6.4-8.2) 


 


 


Albumin


  2.7 G/DL


(3.4-5.0)  L 


 


 


Globulin 4.4 g/dL   


 


Albumin/Globulin Ratio


  0.6 (1.0-2.7)


L 


 


 


Vancomycin Level Trough


  64.0 ug/mL


(5.0-12.0)  H 10.8 ug/mL


(5.0-12.0)











Microbiology








 Date/Time


Source Procedure


Growth Status


 


 


 1/21/18 14:22


Blood Blood Culture - Preliminary Resulted


 


 1/21/18 14:18


Blood Blood Culture - Preliminary


NO GROWTH AFTER 24 HOURS Resulted





 1/22/18 16:35


Chest Gram Stain - Final Resulted


 


 1/22/18 16:35


Chest Surgical Biopsy Culture


Pending Resulted

















Intake and Output  


 


 1/22/18 1/23/18





 19:00 07:00


 


Intake Total 1000 ml 


 


Balance 1000 ml 


 


  


 


Intake Oral 250 ml 


 


IV Total 750 ml 


 


# Voids 1 2








Objective


General Appearance:  WD/WN, alert, moderate distress


EENT:  PERRL/EOMI, normal ENT inspection


Neck:  non-tender, normal alignment, supple, normal inspection


Cardiovascular:  normal peripheral pulses, normal rate, regular rhythm, no 

gallop/murmur, no JVD


Respiratory/Chest:  chest wall non-tender, lungs clear, normal breath sounds, 

no respiratory distress, no accessory muscle use


Abdomen:  normal bowel sounds, non tender, soft, no organomegaly, no mass


Extremities:  normal range of motion, non-tender


Neurologic:  CNs II-XII grossly normal, no motor/sensory deficits


Skin:  normal pigmentation, warm/dry





Assessment/Plan


Problem List:  


(1) Nausea & vomiting


Assessment & Plan:  continue zofran





(2) Leukocytosis


Assessment & Plan:  Due to sepsis.  Antibiotic per ID





(3) Bloodstream infection due to port-a-cath


Assessment & Plan:  S/P removal port A Cath on 1/22/18.  See Surgery note.  

Vanco day #4/28 per ID





(4) Sepsis


Assessment & Plan:  Staph aureus.  Continue vanco Day # 4/28 per ID





(5) UTI (urinary tract infection)


(6) Breast CA


Assessment & Plan:  Currently on chemotherapy





Status:  KYLAH Bishop Jan 23, 2018 17:47

## 2018-01-23 NOTE — CARDIOLOGY REPORT
--------------- APPROVED REPORT --------------





EXAM: Two-dimensional and M-mode echocardiogram with Doppler and color 

Doppler.



INDICATION

R/O Endocarditis



M-Mode DIMENSIONS 

IVSd1.2 (0.7-1.1cm)Left Atrium (MM)3.3 (1.6-4.0cm)

LVDd4.3 (3.5-5.6cm)Aortic Root2.7 (2.0-3.7cm)

PWd1.4 (0.7-1.1cm)Aortic Cusp Exc.1.8 (1.5-2.0cm)



LVDs2.4 (2.5-4.0cm)

PWs1.9 cm





Normal left ventricular chamber size, systolic function and wall motion.

Left ventricular ejection fraction estimated to be 60 %.

Mild left ventricular hypertrophy.

No evidence of pericardial effusion.

Mild left atrial enlargement.

Right cardiac chamber sizes are within normal limits.

Normal appearing aortic, mitral, pulmonic and tricuspid valves.

Mild mitral annulus and aortic root calcification.

IVC is at normal size with physiological collapse. 

No discrete vegetations seen, however SBE may not be excluded by transthoracic 2-D echo. 

Consider RICHARD if clinically indicated.



A  color flow and spectral Doppler study was performed and revealed:

No aortic insufficiency.

Mild mitral regurgitation.

Normal left ventricular diastolic function. 

Mild tricuspid regurgitation.

Tricuspid  systolic velocities suggests peak right ventricular systolic pressure of 32 

mmHg.

Trace pulmonic regurgitation present.

## 2018-01-24 VITALS — DIASTOLIC BLOOD PRESSURE: 80 MMHG | SYSTOLIC BLOOD PRESSURE: 130 MMHG

## 2018-01-24 VITALS — DIASTOLIC BLOOD PRESSURE: 79 MMHG | SYSTOLIC BLOOD PRESSURE: 118 MMHG

## 2018-01-24 VITALS — DIASTOLIC BLOOD PRESSURE: 83 MMHG | SYSTOLIC BLOOD PRESSURE: 120 MMHG

## 2018-01-24 VITALS — DIASTOLIC BLOOD PRESSURE: 75 MMHG | SYSTOLIC BLOOD PRESSURE: 114 MMHG

## 2018-01-24 VITALS — DIASTOLIC BLOOD PRESSURE: 82 MMHG | SYSTOLIC BLOOD PRESSURE: 117 MMHG

## 2018-01-24 VITALS — SYSTOLIC BLOOD PRESSURE: 132 MMHG | DIASTOLIC BLOOD PRESSURE: 76 MMHG

## 2018-01-24 LAB
ADD MANUAL DIFF: NO
ANION GAP SERPL CALC-SCNC: 11 MMOL/L (ref 5–15)
BASOPHILS NFR BLD AUTO: 0.7 % (ref 0–2)
BUN SERPL-MCNC: 4 MG/DL (ref 7–18)
CALCIUM SERPL-MCNC: 9.5 MG/DL (ref 8.5–10.1)
CHLORIDE SERPL-SCNC: 101 MMOL/L (ref 98–107)
CO2 SERPL-SCNC: 27 MMOL/L (ref 21–32)
CREAT SERPL-MCNC: 0.7 MG/DL (ref 0.55–1.3)
EOSINOPHIL NFR BLD AUTO: 0.1 % (ref 0–3)
ERYTHROCYTE [DISTWIDTH] IN BLOOD BY AUTOMATED COUNT: 15.7 % (ref 11.6–14.8)
HCT VFR BLD CALC: 29.1 % (ref 37–47)
HGB BLD-MCNC: 9.5 G/DL (ref 12–16)
LYMPHOCYTES NFR BLD AUTO: 16.2 % (ref 20–45)
MCV RBC AUTO: 89 FL (ref 80–99)
MONOCYTES NFR BLD AUTO: 5.9 % (ref 1–10)
NEUTROPHILS NFR BLD AUTO: 77.2 % (ref 45–75)
PLATELET # BLD: 192 K/UL (ref 150–450)
POTASSIUM SERPL-SCNC: 3.8 MMOL/L (ref 3.5–5.1)
RBC # BLD AUTO: 3.26 M/UL (ref 4.2–5.4)
SODIUM SERPL-SCNC: 139 MMOL/L (ref 136–145)
WBC # BLD AUTO: 12.6 K/UL (ref 4.8–10.8)

## 2018-01-24 RX ADMIN — SODIUM CHLORIDE SCH MLS/HR: 0.9 INJECTION INTRAVENOUS at 20:22

## 2018-01-24 RX ADMIN — MORPHINE SULFATE PRN MG: 4 INJECTION INTRAVENOUS at 13:39

## 2018-01-24 RX ADMIN — VANCOMYCIN HCL-SODIUM CHLORIDE IV SOLN 1.25 GM/250ML-0.9% SCH MLS/HR: 1.25-0.9/25 SOLUTION at 18:19

## 2018-01-24 RX ADMIN — VANCOMYCIN HCL-SODIUM CHLORIDE IV SOLN 1.25 GM/250ML-0.9% SCH MLS/HR: 1.25-0.9/25 SOLUTION at 11:48

## 2018-01-24 RX ADMIN — VANCOMYCIN HCL-SODIUM CHLORIDE IV SOLN 1.25 GM/250ML-0.9% SCH MLS/HR: 1.25-0.9/25 SOLUTION at 05:20

## 2018-01-24 RX ADMIN — MORPHINE SULFATE PRN MG: 4 INJECTION INTRAVENOUS at 18:26

## 2018-01-24 RX ADMIN — MORPHINE SULFATE PRN MG: 4 INJECTION INTRAVENOUS at 09:40

## 2018-01-24 RX ADMIN — PANTOPRAZOLE SODIUM SCH MG: 40 INJECTION, POWDER, FOR SOLUTION INTRAVENOUS at 08:29

## 2018-01-24 RX ADMIN — MORPHINE SULFATE PRN MG: 4 INJECTION INTRAVENOUS at 05:20

## 2018-01-24 NOTE — GI PROGRESS NOTE
Assessment/Plan


Problems:  


(1) Bloodstream infection due to port-a-cath


ICD Codes:  T80.211A - Bloodstream infection due to central venous catheter, 

initial encounter


SNOMED:  284034125


(2) Nausea & vomiting


ICD Codes:  R11.2 - Nausea with vomiting, unspecified


SNOMED:  36898889


(3) Breast CA


ICD Codes:  C50.919 - Malignant neoplasm of unspecified site of unspecified 

female breast


SNOMED:  733219846


(4) Abnormal LFTs


ICD Codes:  R94.5 - Abnormal results of liver function studies


SNOMED:  434876819


(5) Chemotherapy induced nausea and vomiting


ICD Codes:  R11.2 - Nausea with vomiting, unspecified; T45.1X5A - Adverse 

effect of antineoplastic and immunosuppressive drugs, initial encounter


SNOMED:  705135915


(6) Dehydration


ICD Codes:  E86.0 - Dehydration


SNOMED:  23053967


(7) Electrolyte imbalance


ICD Codes:  E87.8 - Other disorders of electrolyte and fluid balance, not 

elsewhere classified


SNOMED:  286805211


Status:  stable


Status Narrative


Discussed with Dr. Marie.


Assessment/Plan


CT reviewed, see full report >>  suggests thrombophlebitis.


iron deficiency >> venofer





fu surgical recs


adv to regular diet, tolerating


pain control


Phenergan and zofran


fu labs





Subjective


Gastrointestinal/Abdominal:  Reports: no symptoms


Subjective


N/V/D resolved





Objective





Last 24 Hour Vital Signs








  Date Time  Temp Pulse Resp B/P (MAP) Pulse Ox O2 Delivery O2 Flow Rate FiO2


 


1/24/18 10:10 97.4       


 


1/24/18 08:50     95 Room Air  


 


1/24/18 08:50 97.4 98 20 117/82 95 Room Air  


 


1/24/18 04:00 97.7 75 18 118/79 95 Room Air  


 


1/24/18 00:00 97.8 95 18 132/76 98 Room Air  


 


1/23/18 20:00 97.7 92 18 119/81 95   


 


1/23/18 16:00 97.5 87 18 125/75 96   

















Intake and Output  


 


 1/23/18 1/24/18





 19:00 07:00


 


Intake Total 335.000 ml 360 ml


 


Balance 335.000 ml 360 ml


 


  


 


Intake Oral 85 ml 360 ml


 


IV Total 250.000 ml 


 


# Voids  1











Laboratory Tests








Test


  1/24/18


05:05


 


White Blood Count


  12.6 K/UL


(4.8-10.8)  H


 


Red Blood Count


  3.26 M/UL


(4.20-5.40)  L


 


Hemoglobin


  9.5 G/DL


(12.0-16.0)  L


 


Hematocrit


  29.1 %


(37.0-47.0)  L


 


Mean Corpuscular Volume 89 FL (80-99)  


 


Mean Corpuscular Hemoglobin


  29.2 PG


(27.0-31.0)


 


Mean Corpuscular Hemoglobin


Concent 32.7 G/DL


(32.0-36.0)


 


Red Cell Distribution Width


  15.7 %


(11.6-14.8)  H


 


Platelet Count


  192 K/UL


(150-450)  #


 


Mean Platelet Volume


  7.8 FL


(6.5-10.1)


 


Neutrophils (%) (Auto)


  77.2 %


(45.0-75.0)  H


 


Lymphocytes (%) (Auto)


  16.2 %


(20.0-45.0)  L


 


Monocytes (%) (Auto)


  5.9 %


(1.0-10.0)


 


Eosinophils (%) (Auto)


  0.1 %


(0.0-3.0)


 


Basophils (%) (Auto)


  0.7 %


(0.0-2.0)


 


Sodium Level


  139 MMOL/L


(136-145)


 


Potassium Level


  3.8 MMOL/L


(3.5-5.1)


 


Chloride Level


  101 MMOL/L


()


 


Carbon Dioxide Level


  27 MMOL/L


(21-32)


 


Anion Gap


  11 mmol/L


(5-15)


 


Blood Urea Nitrogen


  4 mg/dL (7-18)


L


 


Creatinine


  0.7 MG/DL


(0.55-1.30)


 


Estimat Glomerular Filtration


Rate > 60 mL/min


(>60)


 


Glucose Level


  95 MG/DL


()


 


Calcium Level


  9.5 MG/DL


(8.5-10.1)








Height (Feet):  5


Height (Inches):  7.00


Weight (Pounds):  200


General Appearance:  WD/WN, no apparent distress, alert


Cardiovascular:  normal rate


Respiratory/Chest:  normal breath sounds, no respiratory distress


Abdominal Exam:  normal bowel sounds, non tender, soft


Extremities:  normal range of motion, non-tender











Maggi Montanez N.PMartina Jan 24, 2018 13:46

## 2018-01-24 NOTE — INTERNAL MED PROGRESS NOTE
Subjective


Date of Service:  Jan 24, 2018


Physician Name


Kylah Olson


Attending Physician


Dave Christine MD





Current Medications








 Medications


  (Trade)  Dose


 Ordered  Sig/Indira


 Route


 PRN Reason  Start Time


 Stop Time Status Last Admin


Dose Admin


 


 Acetaminophen


  (Tylenol)  650 mg  Q4H  PRN


 ORAL


 fever  1/19/18 05:30


 2/18/18 05:29  1/19/18 11:46


 


 


 Acetaminophen/


 Hydrocodone Bitart


  (Norco 5/325)  1 tab  Q4H  PRN


 ORAL


 Moderate Pain (Pain Scale 4-6)  1/19/18 05:30


 1/26/18 05:29  1/22/18 08:57


 


 


 Al Hydroxide/Mg


 Hydroxide


  (Mylanta II)  30 ml  Q6H  PRN


 ORAL


 dyspepsia  1/19/18 05:30


 2/18/18 05:29   


 


 


 Dextrose


  (Dextrose 50%)    STAT  PRN


 IV


 Hypoglycemia  1/19/18 05:30


 2/18/18 05:29   


 


 


 Diphenhydramine


 HCl


  (Benadryl)  25 mg  Q6H  PRN


 ORAL


 Itching/Pruritis  1/19/18 05:30


 2/18/18 05:29   


 


 


 Folic Acid


  (Folate)  1 mg  DAILY


 ORAL


   1/22/18 09:00


 2/21/18 08:59  1/24/18 08:29


 


 


 Iron Sucrose 100


 mg/Sodium Chloride  60 ml @ 


 240 mls/hr  BEDTIME


 IVPB


   1/22/18 21:00


 1/24/18 21:14  1/23/18 20:26


 


 


 Lorazepam


  (Ativan 2mg/ml


 1ml)  1 mg  Q4H  PRN


 IV


 agitation  1/19/18 05:30


 1/26/18 05:29   


 


 


 Morphine Sulfate


  (Morphine


 Sulfate)  4 mg  Q4H  PRN


 IVP


 Severe Pain (Pain Scale 7-10)  1/20/18 19:45


 1/27/18 19:44  1/24/18 09:40


 


 


 Nitroglycerin


  (Ntg)  0.4 mg  Q5M X 3 DOSES PRN


 SL


 Prn Chest Pain  1/19/18 05:30


 2/18/18 05:29   


 


 


 Ondansetron HCl


  (Zofran)  4 mg  Q6H  PRN


 IVP


 Nausea & Vomiting  1/19/18 05:30


 2/18/18 05:29  1/22/18 20:18


 


 


 Pantoprazole


  (Protonix)  40 mg  DAILY


 IV


   1/19/18 09:00


 2/18/18 08:59  1/24/18 08:29


 


 


 Polyethylene


 Glycol


  (Miralax)  17 gm  HSPRN  PRN


 ORAL


 Constipation  1/19/18 05:30


 2/18/18 05:29   


 


 


 Promethazine HCl


  (Phenergan)  25 mg  Q8H  PRN


 ORAL


 Nausea & Vomiting  1/19/18 10:45


 2/18/18 10:44   


 


 


 Temazepam


  (Restoril)  15 mg  HSPRN  PRN


 ORAL


 Insomnia  1/19/18 05:30


 1/26/18 05:29  1/23/18 20:26


 


 


 Vancomycin HCl


  (Vanco rx to


 dose)  1 ea  DAILY  PRN


 MISC


 Per rx protocol  1/20/18 09:15


 2/19/18 09:14   


 


 


 Vancomycin HCl/


 Dextrose  250 ml @ 


 166.667


 mls/hr  Q6HR


 IVPB


   1/23/18 15:00


 1/28/18 14:59  1/24/18 11:48


 








Allergies:  


Coded Allergies:  


     No Known Allergies (Unverified , 10/12/17)


ROS Limited/Unobtainable:  No


Constitutional:  Reports: no symptoms


HEENT:  Reports: no symptoms


Cardiovascular:  Reports: no symptoms


Respiratory:  Reports: no symptoms


Gastrointestinal/Abdominal:  Reports: no symptoms


Genitourinary:  Reports: no symptoms


Neurologic/Psychiatric:  Reports: no symptoms


Subjective


31 YO F admitted with nausea and vomiting.  Now sepsis.  S/P Port A Cath 

removal 1/22/17.  Cover for Int Octavio-Dr Christine





Objective





Last Vital Signs








  Date Time  Temp Pulse Resp B/P (MAP) Pulse Ox O2 Delivery O2 Flow Rate FiO2


 


1/24/18 10:10 97.4       


 


1/24/18 08:50     95 Room Air  


 


1/24/18 08:50  98 20 117/82    











Laboratory Tests








Test


  1/24/18


05:05


 


White Blood Count


  12.6 K/UL


(4.8-10.8)  H


 


Red Blood Count


  3.26 M/UL


(4.20-5.40)  L


 


Hemoglobin


  9.5 G/DL


(12.0-16.0)  L


 


Hematocrit


  29.1 %


(37.0-47.0)  L


 


Mean Corpuscular Volume 89 FL (80-99)  


 


Mean Corpuscular Hemoglobin


  29.2 PG


(27.0-31.0)


 


Mean Corpuscular Hemoglobin


Concent 32.7 G/DL


(32.0-36.0)


 


Red Cell Distribution Width


  15.7 %


(11.6-14.8)  H


 


Platelet Count


  192 K/UL


(150-450)  #


 


Mean Platelet Volume


  7.8 FL


(6.5-10.1)


 


Neutrophils (%) (Auto)


  77.2 %


(45.0-75.0)  H


 


Lymphocytes (%) (Auto)


  16.2 %


(20.0-45.0)  L


 


Monocytes (%) (Auto)


  5.9 %


(1.0-10.0)


 


Eosinophils (%) (Auto)


  0.1 %


(0.0-3.0)


 


Basophils (%) (Auto)


  0.7 %


(0.0-2.0)


 


Sodium Level


  139 MMOL/L


(136-145)


 


Potassium Level


  3.8 MMOL/L


(3.5-5.1)


 


Chloride Level


  101 MMOL/L


()


 


Carbon Dioxide Level


  27 MMOL/L


(21-32)


 


Anion Gap


  11 mmol/L


(5-15)


 


Blood Urea Nitrogen


  4 mg/dL (7-18)


L


 


Creatinine


  0.7 MG/DL


(0.55-1.30)


 


Estimat Glomerular Filtration


Rate > 60 mL/min


(>60)


 


Glucose Level


  95 MG/DL


()


 


Calcium Level


  9.5 MG/DL


(8.5-10.1)











Microbiology








 Date/Time


Source Procedure


Growth Status


 


 


 1/21/18 14:22


Blood Blood Culture - Preliminary Resulted


 


 1/21/18 14:18


Blood Blood Culture - Preliminary Resulted





 1/22/18 16:35


Chest Gram Stain - Final Resulted


 


 1/22/18 16:35 Surgical Biopsy Culture - Preliminary


Staphylococcus Aureus Resulted

















Intake and Output  


 


 1/23/18 1/24/18





 19:00 07:00


 


Intake Total 335.000 ml 360 ml


 


Balance 335.000 ml 360 ml


 


  


 


Intake Oral 85 ml 360 ml


 


IV Total 250.000 ml 


 


# Voids  1








Objective


General Appearance:  WD/WN, alert, moderate distress


EENT:  PERRL/EOMI, normal ENT inspection


Neck:  non-tender, normal alignment, supple, normal inspection


Cardiovascular:  normal peripheral pulses, normal rate, regular rhythm, no 

gallop/murmur, no JVD


Respiratory/Chest:  chest wall non-tender, lungs clear, normal breath sounds, 

no respiratory distress, no accessory muscle use


Abdomen:  normal bowel sounds, non tender, soft, no organomegaly, no mass


Extremities:  normal range of motion, non-tender


Neurologic:  CNs II-XII grossly normal, no motor/sensory deficits


Skin:  normal pigmentation, warm/dry





Assessment/Plan


Problem List:  


(1) Nausea & vomiting


Assessment & Plan:  continue zofran





(2) Leukocytosis


Assessment & Plan:  Due to sepsis.  Antibiotic per ID





(3) Bloodstream infection due to port-a-cath


Assessment & Plan:  S/P removal port A Cath on 1/22/18.  See Surgery note.  

Vanco day #5/28 per ID.  Will require PICC for 28 day antibiotic therapy





(4) Sepsis


Assessment & Plan:  Staph aureus.  Continue vanco Day # 5/28 per ID





(5) UTI (urinary tract infection)


(6) Breast CA


Assessment & Plan:  Currently on chemotherapy














KYLAH OLSON Jan 24, 2018 12:57

## 2018-01-24 NOTE — GENERAL PROGRESS NOTE
Progress Note


Progress Note


Surgery:





POD #2 s/p removal of infected left chest port a cath.  doing well.  afebrile, 

HD stable, leukocytosis improved, wound site c/d/i.  





Abx as per ID


Will need PICC line before next chemo.  


can follow up with me as an outpatient for wound check in 2 weeks.  office info 

given. 


thank you for allowing me to participate in Christoph Martin Jan 24, 2018 16:15

## 2018-01-24 NOTE — INFECTIOUS DISEASES PROG NOTE
Assessment/Plan


Assessment/Plan


ASSESSMENT:  The patient is a 32-year-old female with,





Nausea, vomiting/diarrhea (probably due to chemo vs related to sepsis)


   -Abd US: Hepatosplenomegaly.Otherwise, unremarkable abdominal sonogram.


Sepsis , SP 


Bacteremia Staph A /  line infection, / septic thrombophlebitis 


  2d Echo no vegetation


  wnd Cx ( over the port) : MSSA 


  SP removal or portacath    Surg Site cx : Staph A.


  CT: Implanted infusion port in the left chest. No definite fluid collection 

around the port. However, there appears to be occlusion of the left jugular 

vein as well as the left brachiocephalic vein with stranding around the 

brachiocephalic vein. This suggests thrombophlebitis.


  - BCx 2/4 GPC (ID and sensi pending)


Leukocytosis, improving


Fever, improving


    -u/a wbc 5/10


    -Influenza neg


    -CXR no focal consolidation


Elevated LFTs, improving





- Right breast mastectomy, status post lumpectomy.


-. History of  and tubal ligation.


-. Status post Port-A-Cath placement.








PLAN:











continue  IV Vancomycin d#  ( need long term AB, 2/2 septic 

thrombophlebitis )


    -  SP  Zosyn # 4 


    - SP Ceftriaxone x1





f/u cx ( Bl ) 


Monitor CBC and CMP;





Subjective


Allergies:  


Coded Allergies:  


     No Known Allergies (Unverified , 10/12/17)


Subjective








no new complain





Objective


Vital Signs





Last 24 Hour Vital Signs








  Date Time  Temp Pulse Resp B/P (MAP) Pulse Ox O2 Delivery O2 Flow Rate FiO2


 


18 10:10 97.4       


 


18 08:50     95 Room Air  


 


18 08:50 97.4 98 20 117/82 95 Room Air  


 


18 04:00 97.7 75 18 118/79 95 Room Air  


 


18 00:00 97.8 95 18 132/76 98 Room Air  


 


18 20:00 97.7 92 18 119/81 95   


 


18 16:00 97.5 87 18 125/75 96   


 


18 12:00 97.0  18 135/92 97   








Height (Feet):  5


Height (Inches):  7.00


Weight (Pounds):  200


HEENT:  anicteric


Respiratory/Chest:  normal breath sounds


Cardiovascular:  regularly irregular


Abdomen:  no organomegaly





Microbiology








 Date/Time


Source Procedure


Growth Status


 


 


 18 14:22


Blood Blood Culture - Preliminary Resulted


 


 18 14:18


Blood Blood Culture - Preliminary Resulted





 18 16:35


Chest Gram Stain - Final Resulted


 


 18 16:35 Surgical Biopsy Culture - Preliminary


Staphylococcus Aureus Resulted











Laboratory Tests








Test


  18


12:00 18


05:05


 


Vancomycin Level Trough


  10.8 ug/mL


(5.0-12.0) 


 


 


White Blood Count


  


  12.6 K/UL


(4.8-10.8)  H


 


Red Blood Count


  


  3.26 M/UL


(4.20-5.40)  L


 


Hemoglobin


  


  9.5 G/DL


(12.0-16.0)  L


 


Hematocrit


  


  29.1 %


(37.0-47.0)  L


 


Mean Corpuscular Volume  89 FL (80-99)  


 


Mean Corpuscular Hemoglobin


  


  29.2 PG


(27.0-31.0)


 


Mean Corpuscular Hemoglobin


Concent 


  32.7 G/DL


(32.0-36.0)


 


Red Cell Distribution Width


  


  15.7 %


(11.6-14.8)  H


 


Platelet Count


  


  192 K/UL


(150-450)  #


 


Mean Platelet Volume


  


  7.8 FL


(6.5-10.1)


 


Neutrophils (%) (Auto)


  


  77.2 %


(45.0-75.0)  H


 


Lymphocytes (%) (Auto)


  


  16.2 %


(20.0-45.0)  L


 


Monocytes (%) (Auto)


  


  5.9 %


(1.0-10.0)


 


Eosinophils (%) (Auto)


  


  0.1 %


(0.0-3.0)


 


Basophils (%) (Auto)


  


  0.7 %


(0.0-2.0)


 


Sodium Level


  


  139 MMOL/L


(136-145)


 


Potassium Level


  


  3.8 MMOL/L


(3.5-5.1)


 


Chloride Level


  


  101 MMOL/L


()


 


Carbon Dioxide Level


  


  27 MMOL/L


(21-32)


 


Anion Gap


  


  11 mmol/L


(5-15)


 


Blood Urea Nitrogen


  


  4 mg/dL (7-18)


L


 


Creatinine


  


  0.7 MG/DL


(0.55-1.30)


 


Estimat Glomerular Filtration


Rate 


  > 60 mL/min


(>60)


 


Glucose Level


  


  95 MG/DL


()


 


Calcium Level


  


  9.5 MG/DL


(8.5-10.1)











Current Medications








 Medications


  (Trade)  Dose


 Ordered  Sig/Indira


 Route


 PRN Reason  Start Time


 Stop Time Status Last Admin


Dose Admin


 


 Acetaminophen


  (Tylenol)  650 mg  Q4H  PRN


 ORAL


 fever  18 05:30


 18 05:29  18 11:46


 


 


 Acetaminophen/


 Hydrocodone Bitart


  (Norco 5/325)  1 tab  Q4H  PRN


 ORAL


 Moderate Pain (Pain Scale 4-6)  18 05:30


 18 05:29  18 08:57


 


 


 Al Hydroxide/Mg


 Hydroxide


  (Mylanta II)  30 ml  Q6H  PRN


 ORAL


 dyspepsia  18 05:30


 18 05:29   


 


 


 Dextrose


  (Dextrose 50%)    STAT  PRN


 IV


 Hypoglycemia  18 05:30


 18 05:29   


 


 


 Diphenhydramine


 HCl


  (Benadryl)  25 mg  Q6H  PRN


 ORAL


 Itching/Pruritis  18 05:30


 18 05:29   


 


 


 Folic Acid


  (Folate)  1 mg  DAILY


 ORAL


   18 09:00


 18 08:59  18 08:29


 


 


 Iron Sucrose 100


 mg/Sodium Chloride  60 ml @ 


 240 mls/hr  BEDTIME


 IVPB


   18 21:00


 18 21:14  18 20:26


 


 


 Lorazepam


  (Ativan 2mg/ml


 1ml)  1 mg  Q4H  PRN


 IV


 agitation  18 05:30


 18 05:29   


 


 


 Morphine Sulfate


  (Morphine


 Sulfate)  4 mg  Q4H  PRN


 IVP


 Severe Pain (Pain Scale 7-10)  18 19:45


 18 19:44  18 09:40


 


 


 Nitroglycerin


  (Ntg)  0.4 mg  Q5M X 3 DOSES PRN


 SL


 Prn Chest Pain  18 05:30


 18 05:29   


 


 


 Ondansetron HCl


  (Zofran)  4 mg  Q6H  PRN


 IVP


 Nausea & Vomiting  18 05:30


 18 05:29  1/22/18 20:18


 


 


 Pantoprazole


  (Protonix)  40 mg  DAILY


 IV


   18 09:00


 18 08:59  18 08:29


 


 


 Polyethylene


 Glycol


  (Miralax)  17 gm  HSPRN  PRN


 ORAL


 Constipation  18 05:30


 18 05:29   


 


 


 Promethazine HCl


  (Phenergan)  25 mg  Q8H  PRN


 ORAL


 Nausea & Vomiting  18 10:45


 18 10:44   


 


 


 Temazepam


  (Restoril)  15 mg  HSPRN  PRN


 ORAL


 Insomnia  18 05:30


 18 05:29  18 20:26


 


 


 Vancomycin HCl


  (Vanco rx to


 dose)  1 ea  DAILY  PRN


 MISC


 Per rx protocol  18 09:15


 18 09:14   


 


 


 Vancomycin HCl/


 Dextrose  250 ml @ 


 166.667


 mls/hr  Q6HR


 IVPB


   18 15:00


 18 14:59  18 05:20


 

















ERIC GARCIA M.D. 2018 11:17

## 2018-01-24 NOTE — PULMONOLOGY PROGRESS NOTE
Assessment/Plan


Problems:  


(1) Septic thrombophlebitis


(2) Sepsis


(3) Chemotherapy induced nausea and vomiting


(4) Breast CA


(5) Bloodstream infection due to port-a-cath


Assessment/Plan


improving


iv abx, vancomycin for total of 28 dasys


check cultures


all notes reviewed





Subjective


ROS Limited/Unobtainable:  No


Constitutional:  Reports: no symptoms


HEENT:  Repors: no symptoms


Allergies:  


Coded Allergies:  


     No Known Allergies (Unverified , 10/12/17)





Objective





Last 24 Hour Vital Signs








  Date Time  Temp Pulse Resp B/P (MAP) Pulse Ox O2 Delivery O2 Flow Rate FiO2


 


1/24/18 14:09 97.4       


 


1/24/18 12:00 98.2 103 18 130/80 96 Room Air  


 


1/24/18 08:50     95 Room Air  


 


1/24/18 08:50 97.4 98 20 117/82 95 Room Air  


 


1/24/18 04:00 97.7 75 18 118/79 95 Room Air  


 


1/24/18 00:00 97.8 95 18 132/76 98 Room Air  


 


1/23/18 20:00 97.7 92 18 119/81 95   


 


1/23/18 16:00 97.5 87 18 125/75 96   

















Intake and Output  


 


 1/23/18 1/24/18





 19:00 07:00


 


Intake Total 335.000 ml 360 ml


 


Balance 335.000 ml 360 ml


 


  


 


Intake Oral 85 ml 360 ml


 


IV Total 250.000 ml 


 


# Voids  1








Objective


General Appearance:  no acute distress, other - A/A/O x 3 morbidly obese AA 

male in NAD


HEENT:  normocephalic, atraumatic, anicteric, mucous membranes moist


Respiratory/Chest:  lungs clear -with moderate air exchange  no respiratory 

distress, no accessory muscle use, tenderness around Left upper chest area


Cardiovascular:  normal rate 


Abdomen:  normal bowel sounds, soft, non tender , obese


Extremities:  pedal pulses normal, ot


Neurologic/Psychiatric:  no motor/sensory deficits, alert, oriented x 3, 

responsive


Musculoskeletal:  normal muscle bulk





Microbiology








 Date/Time


Source Procedure


Growth Status


 


 


 1/22/18 16:35


Chest Gram Stain - Final Resulted


 


 1/22/18 16:35 Surgical Biopsy Culture - Preliminary


Staphylococcus Aureus Resulted








Laboratory Tests


1/24/18 05:05: 


White Blood Count 12.6H, Red Blood Count 3.26L, Hemoglobin 9.5L, Hematocrit 

29.1L, Mean Corpuscular Volume 89, Mean Corpuscular Hemoglobin 29.2, Mean 

Corpuscular Hemoglobin Concent 32.7, Red Cell Distribution Width 15.7H, 

Platelet Count 192#, Mean Platelet Volume 7.8, Neutrophils (%) (Auto) 77.2H, 

Lymphocytes (%) (Auto) 16.2L, Monocytes (%) (Auto) 5.9, Eosinophils (%) (Auto) 

0.1, Basophils (%) (Auto) 0.7, Sodium Level 139, Potassium Level 3.8, Chloride 

Level 101, Carbon Dioxide Level 27, Anion Gap 11, Blood Urea Nitrogen 4L, 

Creatinine 0.7, Estimat Glomerular Filtration Rate > 60, Glucose Level 95, 

Calcium Level 9.5





Current Medications








 Medications


  (Trade)  Dose


 Ordered  Sig/Indira


 Route


 PRN Reason  Start Time


 Stop Time Status Last Admin


Dose Admin


 


 Acetaminophen


  (Tylenol)  650 mg  Q4H  PRN


 ORAL


 fever  1/19/18 05:30


 2/18/18 05:29  1/19/18 11:46


 


 


 Acetaminophen/


 Hydrocodone Bitart


  (Norco 5/325)  1 tab  Q4H  PRN


 ORAL


 Moderate Pain (Pain Scale 4-6)  1/19/18 05:30


 1/26/18 05:29  1/22/18 08:57


 


 


 Al Hydroxide/Mg


 Hydroxide


  (Mylanta II)  30 ml  Q6H  PRN


 ORAL


 dyspepsia  1/19/18 05:30


 2/18/18 05:29   


 


 


 Dextrose


  (Dextrose 50%)    STAT  PRN


 IV


 Hypoglycemia  1/19/18 05:30


 2/18/18 05:29   


 


 


 Diphenhydramine


 HCl


  (Benadryl)  25 mg  Q6H  PRN


 ORAL


 Itching/Pruritis  1/19/18 05:30


 2/18/18 05:29   


 


 


 Folic Acid


  (Folate)  1 mg  DAILY


 ORAL


   1/22/18 09:00


 2/21/18 08:59  1/24/18 08:29


 


 


 Iron Sucrose 100


 mg/Sodium Chloride  60 ml @ 


 240 mls/hr  BEDTIME


 IVPB


   1/22/18 21:00


 1/24/18 21:14  1/23/18 20:26


 


 


 Lorazepam


  (Ativan 2mg/ml


 1ml)  1 mg  Q4H  PRN


 IV


 agitation  1/19/18 05:30


 1/26/18 05:29   


 


 


 Morphine Sulfate


  (Morphine


 Sulfate)  4 mg  Q4H  PRN


 IVP


 Severe Pain (Pain Scale 7-10)  1/20/18 19:45


 1/27/18 19:44  1/24/18 13:39


 


 


 Nitroglycerin


  (Ntg)  0.4 mg  Q5M X 3 DOSES PRN


 SL


 Prn Chest Pain  1/19/18 05:30


 2/18/18 05:29   


 


 


 Ondansetron HCl


  (Zofran)  4 mg  Q6H  PRN


 IVP


 Nausea & Vomiting  1/19/18 05:30


 2/18/18 05:29  1/22/18 20:18


 


 


 Pantoprazole


  (Protonix)  40 mg  DAILY


 IV


   1/19/18 09:00


 2/18/18 08:59  1/24/18 08:29


 


 


 Polyethylene


 Glycol


  (Miralax)  17 gm  HSPRN  PRN


 ORAL


 Constipation  1/19/18 05:30


 2/18/18 05:29   


 


 


 Promethazine HCl


  (Phenergan)  25 mg  Q8H  PRN


 ORAL


 Nausea & Vomiting  1/19/18 10:45


 2/18/18 10:44   


 


 


 Temazepam


  (Restoril)  15 mg  HSPRN  PRN


 ORAL


 Insomnia  1/19/18 05:30


 1/26/18 05:29  1/23/18 20:26


 


 


 Vancomycin HCl


  (Vanco rx to


 dose)  1 ea  DAILY  PRN


 MISC


 Per rx protocol  1/20/18 09:15


 2/19/18 09:14   


 


 


 Vancomycin HCl/


 Dextrose  250 ml @ 


 166.667


 mls/hr  Q6HR


 IVPB


   1/23/18 15:00


 1/28/18 14:59  1/24/18 11:48


 

















JOE PRITCHETT Jan 24, 2018 15:36

## 2018-01-25 VITALS — SYSTOLIC BLOOD PRESSURE: 116 MMHG | DIASTOLIC BLOOD PRESSURE: 70 MMHG

## 2018-01-25 VITALS — DIASTOLIC BLOOD PRESSURE: 74 MMHG | SYSTOLIC BLOOD PRESSURE: 116 MMHG

## 2018-01-25 VITALS — SYSTOLIC BLOOD PRESSURE: 122 MMHG | DIASTOLIC BLOOD PRESSURE: 81 MMHG

## 2018-01-25 VITALS — SYSTOLIC BLOOD PRESSURE: 121 MMHG | DIASTOLIC BLOOD PRESSURE: 79 MMHG

## 2018-01-25 VITALS — DIASTOLIC BLOOD PRESSURE: 79 MMHG | SYSTOLIC BLOOD PRESSURE: 122 MMHG

## 2018-01-25 VITALS — SYSTOLIC BLOOD PRESSURE: 112 MMHG | DIASTOLIC BLOOD PRESSURE: 66 MMHG

## 2018-01-25 LAB
ADD MANUAL DIFF: NO
ANION GAP SERPL CALC-SCNC: 9 MMOL/L (ref 5–15)
BASOPHILS NFR BLD AUTO: 0.7 % (ref 0–2)
BUN SERPL-MCNC: 3 MG/DL (ref 7–18)
CALCIUM SERPL-MCNC: 9.5 MG/DL (ref 8.5–10.1)
CHLORIDE SERPL-SCNC: 101 MMOL/L (ref 98–107)
CO2 SERPL-SCNC: 27 MMOL/L (ref 21–32)
CREAT SERPL-MCNC: 0.7 MG/DL (ref 0.55–1.3)
EOSINOPHIL NFR BLD AUTO: 0.1 % (ref 0–3)
ERYTHROCYTE [DISTWIDTH] IN BLOOD BY AUTOMATED COUNT: 16.2 % (ref 11.6–14.8)
HCT VFR BLD CALC: 29.1 % (ref 37–47)
HGB BLD-MCNC: 9.6 G/DL (ref 12–16)
LYMPHOCYTES NFR BLD AUTO: 12.6 % (ref 20–45)
MCV RBC AUTO: 90 FL (ref 80–99)
MONOCYTES NFR BLD AUTO: 6.4 % (ref 1–10)
NEUTROPHILS NFR BLD AUTO: 80.2 % (ref 45–75)
PLATELET # BLD: 261 K/UL (ref 150–450)
POTASSIUM SERPL-SCNC: 3.8 MMOL/L (ref 3.5–5.1)
RBC # BLD AUTO: 3.24 M/UL (ref 4.2–5.4)
SODIUM SERPL-SCNC: 137 MMOL/L (ref 136–145)
WBC # BLD AUTO: 12.5 K/UL (ref 4.8–10.8)

## 2018-01-25 RX ADMIN — MORPHINE SULFATE PRN MG: 4 INJECTION INTRAVENOUS at 05:30

## 2018-01-25 RX ADMIN — MORPHINE SULFATE PRN MG: 4 INJECTION INTRAVENOUS at 23:32

## 2018-01-25 RX ADMIN — MORPHINE SULFATE PRN MG: 4 INJECTION INTRAVENOUS at 09:55

## 2018-01-25 RX ADMIN — VANCOMYCIN HCL-SODIUM CHLORIDE IV SOLN 1.25 GM/250ML-0.9% SCH MLS/HR: 1.25-0.9/25 SOLUTION at 06:12

## 2018-01-25 RX ADMIN — PANTOPRAZOLE SODIUM SCH MG: 40 INJECTION, POWDER, FOR SOLUTION INTRAVENOUS at 08:45

## 2018-01-25 RX ADMIN — MORPHINE SULFATE PRN MG: 4 INJECTION INTRAVENOUS at 15:58

## 2018-01-25 RX ADMIN — MORPHINE SULFATE PRN MG: 4 INJECTION INTRAVENOUS at 00:25

## 2018-01-25 RX ADMIN — VANCOMYCIN HCL-SODIUM CHLORIDE IV SOLN 1.25 GM/250ML-0.9% SCH MLS/HR: 1.25-0.9/25 SOLUTION at 00:06

## 2018-01-25 RX ADMIN — VANCOMYCIN HCL-SODIUM CHLORIDE IV SOLN 1.25 GM/250ML-0.9% SCH MLS/HR: 1.25-0.9/25 SOLUTION at 17:57

## 2018-01-25 RX ADMIN — VANCOMYCIN HCL-SODIUM CHLORIDE IV SOLN 1.25 GM/250ML-0.9% SCH MLS/HR: 1.25-0.9/25 SOLUTION at 23:21

## 2018-01-25 RX ADMIN — VANCOMYCIN HCL-SODIUM CHLORIDE IV SOLN 1.25 GM/250ML-0.9% SCH MLS/HR: 1.25-0.9/25 SOLUTION at 12:24

## 2018-01-25 NOTE — INTERNAL MED PROGRESS NOTE
Subjective


Date of Service:  Jan 25, 2018


Physician Name


Kylah Olson


Attending Physician


Dave Christine MD





Current Medications








 Medications


  (Trade)  Dose


 Ordered  Sig/Indira


 Route


 PRN Reason  Start Time


 Stop Time Status Last Admin


Dose Admin


 


 Acetaminophen


  (Tylenol)  650 mg  Q4H  PRN


 ORAL


 fever  1/19/18 05:30


 2/18/18 05:29  1/19/18 11:46


 


 


 Acetaminophen/


 Hydrocodone Bitart


  (Norco 5/325)  1 tab  Q4H  PRN


 ORAL


 Moderate Pain (Pain Scale 4-6)  1/19/18 05:30


 1/26/18 05:29  1/22/18 08:57


 


 


 Al Hydroxide/Mg


 Hydroxide


  (Mylanta II)  30 ml  Q6H  PRN


 ORAL


 dyspepsia  1/19/18 05:30


 2/18/18 05:29   


 


 


 Dextrose


  (Dextrose 50%)    STAT  PRN


 IV


 Hypoglycemia  1/19/18 05:30


 2/18/18 05:29   


 


 


 Diphenhydramine


 HCl


  (Benadryl)  25 mg  Q6H  PRN


 ORAL


 Itching/Pruritis  1/19/18 05:30


 2/18/18 05:29   


 


 


 Folic Acid


  (Folate)  1 mg  DAILY


 ORAL


   1/22/18 09:00


 2/21/18 08:59  1/25/18 08:47


 


 


 Lorazepam


  (Ativan 2mg/ml


 1ml)  1 mg  Q4H  PRN


 IV


 agitation  1/19/18 05:30


 1/26/18 05:29   


 


 


 Morphine Sulfate


  (Morphine


 Sulfate)  4 mg  Q4H  PRN


 IVP


 Severe Pain (Pain Scale 7-10)  1/20/18 19:45


 1/27/18 19:44  1/25/18 15:58


 


 


 Nitroglycerin


  (Ntg)  0.4 mg  Q5M X 3 DOSES PRN


 SL


 Prn Chest Pain  1/19/18 05:30


 2/18/18 05:29   


 


 


 Ondansetron HCl


  (Zofran)  4 mg  Q6H  PRN


 IVP


 Nausea & Vomiting  1/19/18 05:30


 2/18/18 05:29  1/24/18 18:25


 


 


 Pantoprazole


  (Protonix)  40 mg  DAILY


 IV


   1/19/18 09:00


 2/18/18 08:59  1/25/18 08:45


 


 


 Polyethylene


 Glycol


  (Miralax)  17 gm  HSPRN  PRN


 ORAL


 Constipation  1/19/18 05:30


 2/18/18 05:29  1/25/18 06:16


 


 


 Promethazine HCl


  (Phenergan)  25 mg  Q8H  PRN


 ORAL


 Nausea & Vomiting  1/19/18 10:45


 2/18/18 10:44   


 


 


 Temazepam


  (Restoril)  15 mg  HSPRN  PRN


 ORAL


 Insomnia  1/19/18 05:30


 1/26/18 05:29  1/24/18 20:22


 


 


 Vancomycin HCl


  (Vanco rx to


 dose)  1 ea  DAILY  PRN


 MISC


 Per rx protocol  1/20/18 09:15


 2/19/18 09:14   


 


 


 Vancomycin HCl/


 Dextrose  250 ml @ 


 166.667


 mls/hr  Q6HR


 IVPB


   1/23/18 15:00


 1/28/18 14:59  1/25/18 12:24


 








Allergies:  


Coded Allergies:  


     No Known Allergies (Unverified , 10/12/17)


ROS Limited/Unobtainable:  No


Constitutional:  Reports: no symptoms


HEENT:  Reports: no symptoms


Cardiovascular:  Reports: no symptoms


Respiratory:  Reports: no symptoms


Gastrointestinal/Abdominal:  Reports: no symptoms


Genitourinary:  Reports: no symptoms


Neurologic/Psychiatric:  Reports: no symptoms


Subjective


31 YO F admitted with nausea and vomiting.  Now sepsis.  S/P Port A Cath 

removal 1/22/17.  Await PICC line.  Cover for Int Octavio-Dr Christine





Objective





Last Vital Signs








  Date Time  Temp Pulse Resp B/P (MAP) Pulse Ox O2 Delivery O2 Flow Rate FiO2


 


1/25/18 16:00 98.9 99 19 116/74 98   


 


1/24/18 16:00      Room Air  











Laboratory Tests








Test


  1/24/18


17:30 1/25/18


05:20


 


Vancomycin Level Trough


  17.3 ug/mL


(5.0-12.0)  H 


 


 


White Blood Count


  


  12.5 K/UL


(4.8-10.8)  H


 


Red Blood Count


  


  3.24 M/UL


(4.20-5.40)  L


 


Hemoglobin


  


  9.6 G/DL


(12.0-16.0)  L


 


Hematocrit


  


  29.1 %


(37.0-47.0)  L


 


Mean Corpuscular Volume  90 FL (80-99)  


 


Mean Corpuscular Hemoglobin


  


  29.6 PG


(27.0-31.0)


 


Mean Corpuscular Hemoglobin


Concent 


  33.0 G/DL


(32.0-36.0)


 


Red Cell Distribution Width


  


  16.2 %


(11.6-14.8)  H


 


Platelet Count


  


  261 K/UL


(150-450)


 


Mean Platelet Volume


  


  7.4 FL


(6.5-10.1)


 


Neutrophils (%) (Auto)


  


  80.2 %


(45.0-75.0)  H


 


Lymphocytes (%) (Auto)


  


  12.6 %


(20.0-45.0)  L


 


Monocytes (%) (Auto)


  


  6.4 %


(1.0-10.0)


 


Eosinophils (%) (Auto)


  


  0.1 %


(0.0-3.0)


 


Basophils (%) (Auto)


  


  0.7 %


(0.0-2.0)


 


Sodium Level


  


  137 MMOL/L


(136-145)


 


Potassium Level


  


  3.8 MMOL/L


(3.5-5.1)


 


Chloride Level


  


  101 MMOL/L


()


 


Carbon Dioxide Level


  


  27 MMOL/L


(21-32)


 


Anion Gap


  


  9 mmol/L


(5-15)


 


Blood Urea Nitrogen


  


  3 mg/dL (7-18)


L


 


Creatinine


  


  0.7 MG/DL


(0.55-1.30)


 


Estimat Glomerular Filtration


Rate 


  > 60 mL/min


(>60)


 


Glucose Level


  


  104 MG/DL


()


 


Calcium Level


  


  9.5 MG/DL


(8.5-10.1)

















Intake and Output  


 


 1/24/18 1/25/18





 19:00 07:00


 


Intake Total 970 ml 840 ml


 


Balance 970 ml 840 ml


 


  


 


Intake Oral 720 ml 840 ml


 


IV Total 250 ml 


 


# Voids 2 








Objective


General Appearance:  WD/WN, alert, moderate distress


EENT:  PERRL/EOMI, normal ENT inspection


Neck:  non-tender, normal alignment, supple, normal inspection


Cardiovascular:  normal peripheral pulses, normal rate, regular rhythm, no 

gallop/murmur, no JVD


Respiratory/Chest:  chest wall non-tender, lungs clear, normal breath sounds, 

no respiratory distress, no accessory muscle use


Abdomen:  normal bowel sounds, non tender, soft, no organomegaly, no mass


Extremities:  normal range of motion, non-tender


Neurologic:  CNs II-XII grossly normal, no motor/sensory deficits


Skin:  normal pigmentation, warm/dry





Assessment/Plan


Problem List:  


(1) Nausea & vomiting


Assessment & Plan:  continue zofran





(2) Leukocytosis


Assessment & Plan:  Due to sepsis.  Antibiotic per ID





(3) Bloodstream infection due to port-a-cath


Assessment & Plan:  S/P removal port A Cath on 1/22/18.  See Surgery note.  

Vanco day #6/28 per ID.  Will require PICC for 28 day antibiotic therapy





(4) Sepsis


Assessment & Plan:  Staph aureus.  Continue vanco Day # 6/28 per ID





(5) UTI (urinary tract infection)


(6) Breast CA


Assessment & Plan:  Currently on chemotherapy





Status:  not improved











KYLAH OLSON Jan 25, 2018 17:13

## 2018-01-25 NOTE — PULMONOLOGY PROGRESS NOTE
Assessment/Plan


Problems:  


(1) Bacteremia


(2) Septic thrombophlebitis


(3) Sepsis


(4) Chemotherapy induced nausea and vomiting


(5) Breast CA


(6) Bloodstream infection due to port-a-cath


Assessment/Plan


improving


iv abx, vancomycin for total of 28 dasys


check cultures


all notes reviewed


dc planning with hh for protracted abx therapy





Subjective


ROS Limited/Unobtainable:  No


Constitutional:  Reports: no symptoms


HEENT:  Repors: no symptoms


Respiratory:  Reports: no symptoms


Allergies:  


Coded Allergies:  


     No Known Allergies (Unverified , 10/12/17)





Objective





Last 24 Hour Vital Signs








  Date Time  Temp Pulse Resp B/P (MAP) Pulse Ox O2 Delivery O2 Flow Rate FiO2


 


1/25/18 12:00 98.5 96 18 122/79 97   


 


1/25/18 08:00 97.7 99 19 122/81 100   


 


1/25/18 04:00 98.9 100 20 116/70 98   


 


1/25/18 00:47 98.7 95 19 121/79 95   


 


1/24/18 20:15 99.1 98 20 114/75 96   


 


1/24/18 18:56 97.4       


 


1/24/18 16:00 99.0 96 18 120/83 99 Room Air  

















Intake and Output  


 


 1/24/18 1/25/18





 19:00 07:00


 


Intake Total 970 ml 840 ml


 


Balance 970 ml 840 ml


 


  


 


Intake Oral 720 ml 840 ml


 


IV Total 250 ml 


 


# Voids 2 








Objective


General Appearance:  no acute distress, other - A/A/O x 3 morbidly obese AA 

male in NAD


HEENT:  normocephalic, atraumatic, anicteric, mucous membranes moist


Respiratory/Chest:  lungs clear -with moderate air exchange  no respiratory 

distress, no accessory muscle use, tenderness around Left upper chest area


Cardiovascular:  normal rate 


Abdomen:  normal bowel sounds, soft, non tender , obese


Extremities:  pedal pulses normal, ot


Neurologic/Psychiatric:  no motor/sensory deficits, alert, oriented x 3, 

responsive


Musculoskeletal:  normal muscle bulk





Microbiology








 Date/Time


Source Procedure


Growth Status


 


 


 1/22/18 16:35


Chest Gram Stain - Final Resulted


 


 1/22/18 16:35 Surgical Biopsy Culture - Preliminary


Staphylococcus Aureus Resulted








Laboratory Tests


1/24/18 17:30: Vancomycin Level Trough 17.3H


1/25/18 05:20: 


White Blood Count 12.5H, Red Blood Count 3.24L, Hemoglobin 9.6L, Hematocrit 

29.1L, Mean Corpuscular Volume 90, Mean Corpuscular Hemoglobin 29.6, Mean 

Corpuscular Hemoglobin Concent 33.0, Red Cell Distribution Width 16.2H, 

Platelet Count 261, Mean Platelet Volume 7.4, Neutrophils (%) (Auto) 80.2H, 

Lymphocytes (%) (Auto) 12.6L, Monocytes (%) (Auto) 6.4, Eosinophils (%) (Auto) 

0.1, Basophils (%) (Auto) 0.7, Sodium Level 137, Potassium Level 3.8, Chloride 

Level 101, Carbon Dioxide Level 27, Anion Gap 9, Blood Urea Nitrogen 3L, 

Creatinine 0.7, Estimat Glomerular Filtration Rate > 60, Glucose Level 104, 

Calcium Level 9.5





Current Medications








 Medications


  (Trade)  Dose


 Ordered  Sig/Indira


 Route


 PRN Reason  Start Time


 Stop Time Status Last Admin


Dose Admin


 


 Acetaminophen


  (Tylenol)  650 mg  Q4H  PRN


 ORAL


 fever  1/19/18 05:30


 2/18/18 05:29  1/19/18 11:46


 


 


 Acetaminophen/


 Hydrocodone Bitart


  (Norco 5/325)  1 tab  Q4H  PRN


 ORAL


 Moderate Pain (Pain Scale 4-6)  1/19/18 05:30


 1/26/18 05:29  1/22/18 08:57


 


 


 Al Hydroxide/Mg


 Hydroxide


  (Mylanta II)  30 ml  Q6H  PRN


 ORAL


 dyspepsia  1/19/18 05:30


 2/18/18 05:29   


 


 


 Dextrose


  (Dextrose 50%)    STAT  PRN


 IV


 Hypoglycemia  1/19/18 05:30


 2/18/18 05:29   


 


 


 Diphenhydramine


 HCl


  (Benadryl)  25 mg  Q6H  PRN


 ORAL


 Itching/Pruritis  1/19/18 05:30


 2/18/18 05:29   


 


 


 Folic Acid


  (Folate)  1 mg  DAILY


 ORAL


   1/22/18 09:00


 2/21/18 08:59  1/25/18 08:47


 


 


 Lorazepam


  (Ativan 2mg/ml


 1ml)  1 mg  Q4H  PRN


 IV


 agitation  1/19/18 05:30


 1/26/18 05:29   


 


 


 Morphine Sulfate


  (Morphine


 Sulfate)  4 mg  Q4H  PRN


 IVP


 Severe Pain (Pain Scale 7-10)  1/20/18 19:45


 1/27/18 19:44  1/25/18 09:55


 


 


 Nitroglycerin


  (Ntg)  0.4 mg  Q5M X 3 DOSES PRN


 SL


 Prn Chest Pain  1/19/18 05:30


 2/18/18 05:29   


 


 


 Ondansetron HCl


  (Zofran)  4 mg  Q6H  PRN


 IVP


 Nausea & Vomiting  1/19/18 05:30


 2/18/18 05:29  1/24/18 18:25


 


 


 Pantoprazole


  (Protonix)  40 mg  DAILY


 IV


   1/19/18 09:00


 2/18/18 08:59  1/25/18 08:45


 


 


 Polyethylene


 Glycol


  (Miralax)  17 gm  HSPRN  PRN


 ORAL


 Constipation  1/19/18 05:30


 2/18/18 05:29  1/25/18 06:16


 


 


 Promethazine HCl


  (Phenergan)  25 mg  Q8H  PRN


 ORAL


 Nausea & Vomiting  1/19/18 10:45


 2/18/18 10:44   


 


 


 Temazepam


  (Restoril)  15 mg  HSPRN  PRN


 ORAL


 Insomnia  1/19/18 05:30


 1/26/18 05:29  1/24/18 20:22


 


 


 Vancomycin HCl


  (Vanco rx to


 dose)  1 ea  DAILY  PRN


 MISC


 Per rx protocol  1/20/18 09:15


 2/19/18 09:14   


 


 


 Vancomycin HCl/


 Dextrose  250 ml @ 


 166.667


 mls/hr  Q6HR


 IVPB


   1/23/18 15:00


 1/28/18 14:59  1/25/18 12:24


 

















JOE PRITCHETT Jan 25, 2018 13:04

## 2018-01-25 NOTE — GI PROGRESS NOTE
Assessment/Plan


Problems:  


(1) Bloodstream infection due to port-a-cath


ICD Codes:  T80.211A - Bloodstream infection due to central venous catheter, 

initial encounter


SNOMED:  685837362


(2) Nausea & vomiting


ICD Codes:  R11.2 - Nausea with vomiting, unspecified


SNOMED:  60933236


(3) Breast CA


ICD Codes:  C50.919 - Malignant neoplasm of unspecified site of unspecified 

female breast


SNOMED:  757027819


(4) Abnormal LFTs


ICD Codes:  R94.5 - Abnormal results of liver function studies


SNOMED:  942794584


(5) Chemotherapy induced nausea and vomiting


ICD Codes:  R11.2 - Nausea with vomiting, unspecified; T45.1X5A - Adverse 

effect of antineoplastic and immunosuppressive drugs, initial encounter


SNOMED:  583687036


(6) Dehydration


ICD Codes:  E86.0 - Dehydration


SNOMED:  04466828


(7) Electrolyte imbalance


ICD Codes:  E87.8 - Other disorders of electrolyte and fluid balance, not 

elsewhere classified


SNOMED:  540090108


Status:  stable


Status Narrative


Discussed with Dr. Marie.


Assessment/Plan


CT reviewed, see full report >>  suggests thrombophlebitis.


iron deficiency >> venofer





fu surgical recs


adv to regular diet, tolerating


pain control


Phenergan and zofran


fu labs





Subjective


Subjective


N/V/D resolved





Objective





Last 24 Hour Vital Signs








  Date Time  Temp Pulse Resp B/P (MAP) Pulse Ox O2 Delivery O2 Flow Rate FiO2


 


1/25/18 08:00 97.7 99 19 122/81 100   


 


1/25/18 04:00 98.9 100 20 116/70 98   


 


1/25/18 00:47 98.7 95 19 121/79 95   


 


1/24/18 20:15 99.1 98 20 114/75 96   


 


1/24/18 18:56 97.4       


 


1/24/18 16:00 99.0 96 18 120/83 99 Room Air  

















Intake and Output  


 


 1/24/18 1/25/18





 19:00 07:00


 


Intake Total 970 ml 840 ml


 


Balance 970 ml 840 ml


 


  


 


Intake Oral 720 ml 840 ml


 


IV Total 250 ml 


 


# Voids 2 











Laboratory Tests








Test


  1/24/18


17:30 1/25/18


05:20


 


Vancomycin Level Trough


  17.3 ug/mL


(5.0-12.0)  H 


 


 


White Blood Count


  


  12.5 K/UL


(4.8-10.8)  H


 


Red Blood Count


  


  3.24 M/UL


(4.20-5.40)  L


 


Hemoglobin


  


  9.6 G/DL


(12.0-16.0)  L


 


Hematocrit


  


  29.1 %


(37.0-47.0)  L


 


Mean Corpuscular Volume  90 FL (80-99)  


 


Mean Corpuscular Hemoglobin


  


  29.6 PG


(27.0-31.0)


 


Mean Corpuscular Hemoglobin


Concent 


  33.0 G/DL


(32.0-36.0)


 


Red Cell Distribution Width


  


  16.2 %


(11.6-14.8)  H


 


Platelet Count


  


  261 K/UL


(150-450)


 


Mean Platelet Volume


  


  7.4 FL


(6.5-10.1)


 


Neutrophils (%) (Auto)


  


  80.2 %


(45.0-75.0)  H


 


Lymphocytes (%) (Auto)


  


  12.6 %


(20.0-45.0)  L


 


Monocytes (%) (Auto)


  


  6.4 %


(1.0-10.0)


 


Eosinophils (%) (Auto)


  


  0.1 %


(0.0-3.0)


 


Basophils (%) (Auto)


  


  0.7 %


(0.0-2.0)


 


Sodium Level


  


  137 MMOL/L


(136-145)


 


Potassium Level


  


  3.8 MMOL/L


(3.5-5.1)


 


Chloride Level


  


  101 MMOL/L


()


 


Carbon Dioxide Level


  


  27 MMOL/L


(21-32)


 


Anion Gap


  


  9 mmol/L


(5-15)


 


Blood Urea Nitrogen


  


  3 mg/dL (7-18)


L


 


Creatinine


  


  0.7 MG/DL


(0.55-1.30)


 


Estimat Glomerular Filtration


Rate 


  > 60 mL/min


(>60)


 


Glucose Level


  


  104 MG/DL


()


 


Calcium Level


  


  9.5 MG/DL


(8.5-10.1)








Height (Feet):  5


Height (Inches):  7.00


Weight (Pounds):  200


General Appearance:  WD/WN, no apparent distress, alert


Cardiovascular:  normal rate


Respiratory/Chest:  normal breath sounds, no respiratory distress


Abdominal Exam:  normal bowel sounds, non tender, soft


Extremities:  normal range of motion, non-tender











Maggi Montanez N.PMartina Jan 25, 2018 12:19

## 2018-01-26 VITALS — SYSTOLIC BLOOD PRESSURE: 113 MMHG | DIASTOLIC BLOOD PRESSURE: 72 MMHG

## 2018-01-26 VITALS — SYSTOLIC BLOOD PRESSURE: 116 MMHG | DIASTOLIC BLOOD PRESSURE: 80 MMHG

## 2018-01-26 VITALS — SYSTOLIC BLOOD PRESSURE: 115 MMHG | DIASTOLIC BLOOD PRESSURE: 77 MMHG

## 2018-01-26 VITALS — SYSTOLIC BLOOD PRESSURE: 106 MMHG | DIASTOLIC BLOOD PRESSURE: 63 MMHG

## 2018-01-26 VITALS — DIASTOLIC BLOOD PRESSURE: 70 MMHG | SYSTOLIC BLOOD PRESSURE: 115 MMHG

## 2018-01-26 VITALS — DIASTOLIC BLOOD PRESSURE: 75 MMHG | SYSTOLIC BLOOD PRESSURE: 111 MMHG

## 2018-01-26 LAB
ADD MANUAL DIFF: NO
ANION GAP SERPL CALC-SCNC: 11 MMOL/L (ref 5–15)
BASOPHILS NFR BLD AUTO: 0.5 % (ref 0–2)
BUN SERPL-MCNC: 6 MG/DL (ref 7–18)
CALCIUM SERPL-MCNC: 9.7 MG/DL (ref 8.5–10.1)
CHLORIDE SERPL-SCNC: 99 MMOL/L (ref 98–107)
CO2 SERPL-SCNC: 25 MMOL/L (ref 21–32)
CREAT SERPL-MCNC: 0.8 MG/DL (ref 0.55–1.3)
EOSINOPHIL NFR BLD AUTO: 0.1 % (ref 0–3)
ERYTHROCYTE [DISTWIDTH] IN BLOOD BY AUTOMATED COUNT: 16.4 % (ref 11.6–14.8)
HCT VFR BLD CALC: 31.9 % (ref 37–47)
HGB BLD-MCNC: 10.3 G/DL (ref 12–16)
LYMPHOCYTES NFR BLD AUTO: 14.7 % (ref 20–45)
MCV RBC AUTO: 90 FL (ref 80–99)
MONOCYTES NFR BLD AUTO: 7 % (ref 1–10)
NEUTROPHILS NFR BLD AUTO: 77.7 % (ref 45–75)
PLATELET # BLD: 333 K/UL (ref 150–450)
POTASSIUM SERPL-SCNC: 3.8 MMOL/L (ref 3.5–5.1)
RBC # BLD AUTO: 3.53 M/UL (ref 4.2–5.4)
SODIUM SERPL-SCNC: 135 MMOL/L (ref 136–145)
WBC # BLD AUTO: 12.9 K/UL (ref 4.8–10.8)

## 2018-01-26 PROCEDURE — B54NZZA ULTRASONOGRAPHY OF LEFT UPPER EXTREMITY VEINS, GUIDANCE: ICD-10-PCS

## 2018-01-26 PROCEDURE — 05H833Z INSERTION OF INFUSION DEVICE INTO LEFT AXILLARY VEIN, PERCUTANEOUS APPROACH: ICD-10-PCS

## 2018-01-26 RX ADMIN — MORPHINE SULFATE PRN MG: 4 INJECTION INTRAVENOUS at 05:36

## 2018-01-26 RX ADMIN — MORPHINE SULFATE PRN MG: 4 INJECTION INTRAVENOUS at 22:07

## 2018-01-26 RX ADMIN — CHLORHEXIDINE GLUCONATE SCH APPLIC: 213 SOLUTION TOPICAL at 22:08

## 2018-01-26 RX ADMIN — VANCOMYCIN HCL-SODIUM CHLORIDE IV SOLN 1.25 GM/250ML-0.9% SCH MLS/HR: 1.25-0.9/25 SOLUTION at 05:27

## 2018-01-26 RX ADMIN — MORPHINE SULFATE PRN MG: 4 INJECTION INTRAVENOUS at 10:53

## 2018-01-26 RX ADMIN — PANTOPRAZOLE SODIUM SCH MG: 40 INJECTION, POWDER, FOR SOLUTION INTRAVENOUS at 09:05

## 2018-01-26 RX ADMIN — CEFAZOLIN SODIUM SCH MLS/HR: 1 SOLUTION INTRAVENOUS at 15:12

## 2018-01-26 RX ADMIN — MORPHINE SULFATE PRN MG: 4 INJECTION INTRAVENOUS at 17:29

## 2018-01-26 NOTE — DIAGNOSTIC IMAGING REPORT
Indications: Needs long-term IV access

 

Technique: Ultrasound confirms patent compressible left basilic vein. Total sterile

technique, including  sterile probe cover and sterile gel, hat, mask,, sterile gown,

large sterile drape, and preparation with 2% chlorhexidine utilized. Local anesthesia

with 1% lidocaine. Under real-time ultrasound guidance, puncture basilic vein using

21-gauge needle, documented and archived, passage 0.018 guidewire under direct

fluoroscopy, which would not pass beyond the level of the subclavian vein. Exchange

for 5 Ivorian peel-away sheath.  A 5 Ivorian catheter was inserted, used to probe the

central vein, but guidewire and/or catheter would not pass. A small amount of

contrast was injected, and a limited central venogram performed, demonstrating

occlusion of the subclavian and brachiocephalic veins. Extensive upper mediastinal

and chest wall collaterals are noted. 5 Ivorian Bard   dual-lumen power PICC cut to 20

cm. It was inserted through the peel-away sheath. Peel-away sheath and guidewire

removed. Catheter fixed to the skin. Both catheter ports aspirated and flushed.

Patient tolerated procedure well, without immediate complication. Digital radiograph

documents satisfactory catheter tip position, at the level of the left axillary vein

 

Total fluoroscopy time 1.6 minutes.

Total dose area product  144 dGycm2

 

 

Impression: Successful placement of left arm PICC under sonographic and fluoroscopic

guidance, as described above. Note that the catheter was cut short due to central

venous occlusion, suitable for use as a midline

 

Left subclavian and brachycephalic venous occlusion, as described

## 2018-01-26 NOTE — DIAGNOSTIC IMAGING REPORT
Indications: Needs long-term IV access

 

Technique: Ultrasound confirms patent compressible left basilic vein. Total sterile

technique, including  sterile probe cover and sterile gel, hat, mask,, sterile gown,

large sterile drape, and preparation with 2% chlorhexidine utilized. Local anesthesia

with 1% lidocaine. Under real-time ultrasound guidance, puncture basilic vein using

21-gauge needle, documented and archived, passage 0.018 guidewire under direct

fluoroscopy, which would not pass beyond the level of the subclavian vein. Exchange

for 5 Zimbabwean peel-away sheath.  A 5 Zimbabwean catheter was inserted, used to probe the

central vein, but guidewire and/or catheter would not pass. A small amount of

contrast was injected, and a limited central venogram performed, demonstrating

occlusion of the subclavian and brachiocephalic veins. Extensive upper mediastinal

and chest wall collaterals are noted. 5 [place]an Bard   dual-lumen power PICC cut to 20

cm. It was inserted through the peel-away sheath. Peel-away sheath and guidewire

removed. Catheter fixed to the skin. Both catheter ports aspirated and flushed.

Patient tolerated procedure well, without immediate complication. Digital radiograph

documents satisfactory catheter tip position, at the level of the left axillary vein

 

Total fluoroscopy time 1.6 minutes.

Total dose area product  144 dGycm2

 

 

Impression: Successful placement of left arm PICC under sonographic and fluoroscopic

guidance, as described above. Note that the catheter was cut short due to central

venous occlusion, suitable for use as a midline

 

Left subclavian and brachycephalic venous occlusion, as described

## 2018-01-26 NOTE — GI PROGRESS NOTE
Assessment/Plan


Problems:  


(1) Bloodstream infection due to port-a-cath


ICD Codes:  T80.211A - Bloodstream infection due to central venous catheter, 

initial encounter


SNOMED:  [id number]


(2) Nausea & vomiting


ICD Codes:  R11.2 - Nausea with vomiting, unspecified


SNOMED:  47761846


(3) Breast CA


ICD Codes:  C50.919 - Malignant neoplasm of unspecified site of unspecified 

female breast


SNOMED:  335847005


(4) Abnormal LFTs


ICD Codes:  R94.5 - Abnormal results of liver function studies


SNOMED:  540334881


(5) Chemotherapy induced nausea and vomiting


ICD Codes:  R11.2 - Nausea with vomiting, unspecified; T45.1X5A - Adverse 

effect of antineoplastic and immunosuppressive drugs, initial encounter


SNOMED:  808988209


(6) Dehydration


ICD Codes:  E86.0 - Dehydration


SNOMED:  24453872


(7) Electrolyte imbalance


ICD Codes:  E87.8 - Other disorders of electrolyte and fluid balance, not 

elsewhere classified


SNOMED:  841007542


Status:  stable


Status Narrative


Discussed with Dr. Marie.


Assessment/Plan


CT reviewed, see full report >>  suggests thrombophlebitis.


iron deficiency >> venofer





fu surgical recs


adv to regular diet, tolerating


pain control


Phenergan and zofran


fu labs





Subjective


Subjective


N/V/D resolved





Objective





Last 24 Hour Vital Signs








  Date Time  Temp Pulse Resp B/P (MAP) Pulse Ox O2 Delivery O2 Flow Rate FiO2


 


1/26/18 08:05 98.1 100 17 113/72 99   


 


1/26/18 06:06 100.4       


 


1/26/18 04:00 100.4 101 18 106/63 99   


 


1/26/18 00:00 99.1 94 20 115/77 94   


 


1/25/18 22:07 98.0       


 


1/25/18 20:00 102.0 102 18 112/66 97   


 


1/25/18 16:00 98.9 99 19 116/74 98   


 


1/25/18 12:00 98.5 96 18 122/79 97   

















Intake and Output  


 


 1/25/18 1/26/18





 19:00 07:00


 


Intake Total 720 ml 320 ml


 


Balance 720 ml 320 ml


 


  


 


Intake Oral 720 ml 320 ml


 


# Voids  2











Laboratory Tests








Test


  1/26/18


06:40


 


White Blood Count


  12.9 K/UL


(4.8-10.8)  H


 


Red Blood Count


  3.53 M/UL


(4.20-5.40)  L


 


Hemoglobin


  10.3 G/DL


(12.0-16.0)  L


 


Hematocrit


  31.9 %


(37.0-47.0)  L


 


Mean Corpuscular Volume 90 FL (80-99)  


 


Mean Corpuscular Hemoglobin


  29.2 PG


(27.0-31.0)


 


Mean Corpuscular Hemoglobin


Concent 32.3 G/DL


(32.0-36.0)


 


Red Cell Distribution Width


  16.4 %


(11.6-14.8)  H


 


Platelet Count


  333 K/UL


(150-450)


 


Mean Platelet Volume


  6.8 FL


(6.5-10.1)


 


Neutrophils (%) (Auto)


  77.7 %


(45.0-75.0)  H


 


Lymphocytes (%) (Auto)


  14.7 %


(20.0-45.0)  L


 


Monocytes (%) (Auto)


  7.0 %


(1.0-10.0)


 


Eosinophils (%) (Auto)


  0.1 %


(0.0-3.0)


 


Basophils (%) (Auto)


  0.5 %


(0.0-2.0)


 


Sodium Level


  135 MMOL/L


(136-145)  L


 


Potassium Level


  3.8 MMOL/L


(3.5-5.1)


 


Chloride Level


  99 MMOL/L


()


 


Carbon Dioxide Level


  25 MMOL/L


(21-32)


 


Anion Gap


  11 mmol/L


(5-15)


 


Blood Urea Nitrogen


  6 mg/dL (7-18)


L


 


Creatinine


  0.8 MG/DL


(0.55-1.30)


 


Estimat Glomerular Filtration


Rate > 60 mL/min


(>60)


 


Glucose Level


  129 MG/DL


()  H


 


Calcium Level


  9.7 MG/DL


(8.5-10.1)








Height (Feet):  5


Height (Inches):  7.00


Weight (Pounds):  200


General Appearance:  WD/WN, no apparent distress, alert


Cardiovascular:  normal rate


Respiratory/Chest:  normal breath sounds, no respiratory distress


Abdominal Exam:  normal bowel sounds, non tender, soft


Extremities:  normal range of motion, non-tender











Maggi Montanez N.P. Jan 26, 2018 11:45

## 2018-01-26 NOTE — PULMONOLOGY PROGRESS NOTE
Assessment/Plan


Problems:  


(1) Bacteremia


Assessment & Plan:  persistent





(2) Septic thrombophlebitis


(3) Sepsis


(4) Chemotherapy induced nausea and vomiting


(5) Breast CA


(6) Bloodstream infection due to port-a-cath


Assessment/Plan


improving


iv abx, vancomycin for total of 28 dasys


check cultures


all notes reviewed





pt will need RICHARD to rule out endocarditis, considering persistent Bacteremia





Subjective


ROS Limited/Unobtainable:  No


Interval Events:  no new complains


Allergies:  


Coded Allergies:  


     No Known Allergies (Unverified , 10/12/17)





Objective





Last 24 Hour Vital Signs








  Date Time  Temp Pulse Resp B/P (MAP) Pulse Ox O2 Delivery O2 Flow Rate FiO2


 


1/26/18 12:30 98.8 91 18 111/75 99   


 


1/26/18 08:05 98.1 100 17 113/72 99   


 


1/26/18 06:06 100.4       


 


1/26/18 04:00 100.4 101 18 106/63 99   


 


1/26/18 00:00 99.1 94 20 115/77 94   


 


1/25/18 22:07 98.0       


 


1/25/18 20:00 102.0 102 18 112/66 97   


 


1/25/18 16:00 98.9 99 19 116/74 98   

















Intake and Output  


 


 1/25/18 1/26/18





 19:00 07:00


 


Intake Total 720 ml 320 ml


 


Balance 720 ml 320 ml


 


  


 


Intake Oral 720 ml 320 ml


 


# Voids  2








Objective


General Appearance:  no acute distress, other - A/A/O x 3 morbidly obese AA 

male in NAD


HEENT:  normocephalic, atraumatic, anicteric, mucous membranes moist


Respiratory/Chest:  lungs clear -with moderate air exchange  no respiratory 

distress, no accessory muscle use, tenderness around Left upper chest area


Cardiovascular:  normal rate 


Abdomen:  normal bowel sounds, soft, non tender , obese


Extremities:  pedal pulses normal, ot


Neurologic/Psychiatric:  no motor/sensory deficits, alert, oriented x 3, 

responsive


Musculoskeletal:  normal muscle bulk





Microbiology








 Date/Time


Source Procedure


Growth Status


 


 


 1/24/18 05:05


Blood Blood Culture - Preliminary


Staphylococcus Aureus Resulted


 


 1/24/18 05:05


Blood Blood Culture - Preliminary


NO GROWTH AFTER 24 HOURS Resulted








Laboratory Tests


1/26/18 06:40: 


White Blood Count 12.9H, Red Blood Count 3.53L, Hemoglobin 10.3L, Hematocrit 

31.9L, Mean Corpuscular Volume 90, Mean Corpuscular Hemoglobin 29.2, Mean 

Corpuscular Hemoglobin Concent 32.3, Red Cell Distribution Width 16.4H, 

Platelet Count 333, Mean Platelet Volume 6.8, Neutrophils (%) (Auto) 77.7H, 

Lymphocytes (%) (Auto) 14.7L, Monocytes (%) (Auto) 7.0, Eosinophils (%) (Auto) 

0.1, Basophils (%) (Auto) 0.5, Sodium Level 135L, Potassium Level 3.8, Chloride 

Level 99, Carbon Dioxide Level 25, Anion Gap 11, Blood Urea Nitrogen 6L, 

Creatinine 0.8, Estimat Glomerular Filtration Rate > 60, Glucose Level 129H, 

Calcium Level 9.7





Current Medications








 Medications


  (Trade)  Dose


 Ordered  Sig/Indira


 Route


 PRN Reason  Start Time


 Stop Time Status Last Admin


Dose Admin


 


 Acetaminophen


  (Tylenol)  650 mg  Q4H  PRN


 ORAL


 fever  1/19/18 05:30


 2/18/18 05:29  1/25/18 21:08


 


 


 Al Hydroxide/Mg


 Hydroxide


  (Mylanta II)  30 ml  Q6H  PRN


 ORAL


 dyspepsia  1/19/18 05:30


 2/18/18 05:29   


 


 


 Cefazolin Sodium  50 ml @ 


 100 mls/hr  Q6HR


 IV


   1/26/18 13:00


 2/2/18 12:59   


 


 


 Chlorhexidine


 Gluconate


  (Payal-Hex 2%)  1 applic  DAILY@2000


 TOPIC


   1/26/18 20:00


 2/25/18 19:59   


 


 


 Dextrose


  (Dextrose 50%)    STAT  PRN


 IV


 Hypoglycemia  1/19/18 05:30


 2/18/18 05:29   


 


 


 Diphenhydramine


 HCl


  (Benadryl)  25 mg  Q6H  PRN


 ORAL


 Itching/Pruritis  1/19/18 05:30


 2/18/18 05:29   


 


 


 Folic Acid


  (Folate)  1 mg  DAILY


 ORAL


   1/22/18 09:00


 2/21/18 08:59  1/26/18 09:05


 


 


 Heparin Sodium/


 Sodium Chloride


  (Heparin 2000


 units/Ns 1000ml


 premix)  2,000 unit  ONCE


 INJ


   1/26/18 11:00


 1/26/18 23:00   


 


 


 Lidocaine HCl


  (Xylocaine 1%


 30ml)  30 ml  ONCE


 INJ


   1/26/18 11:00


 1/26/18 23:00   


 


 


 Morphine Sulfate


  (Morphine


 Sulfate)  4 mg  Q4H  PRN


 IVP


 Severe Pain (Pain Scale 7-10)  1/20/18 19:45


 1/27/18 19:44  1/26/18 10:53


 


 


 Nitroglycerin


  (Ntg)  0.4 mg  Q5M X 3 DOSES PRN


 SL


 Prn Chest Pain  1/19/18 05:30


 2/18/18 05:29   


 


 


 Ondansetron HCl


  (Zofran)  4 mg  Q6H  PRN


 IVP


 Nausea & Vomiting  1/19/18 05:30


 2/18/18 05:29  1/25/18 23:33


 


 


 Pantoprazole


  (Protonix)  40 mg  DAILY


 IV


   1/19/18 09:00


 2/18/18 08:59  1/26/18 09:05


 


 


 Polyethylene


 Glycol


  (Miralax)  17 gm  HSPRN  PRN


 ORAL


 Constipation  1/19/18 05:30


 2/18/18 05:29  1/25/18 06:16


 


 


 Promethazine HCl


  (Phenergan)  25 mg  Q8H  PRN


 ORAL


 Nausea & Vomiting  1/19/18 10:45


 2/18/18 10:44   


 

















JOE PRITCHETT Jan 26, 2018 13:01

## 2018-01-26 NOTE — CONSULTATION
DATE OF CONSULTATION:  2018



NOTE:  POOR AUDIO



HEMATOLOGY/ONCOLOGY CONSULTATION



CONSULTING PHYSICIAN:  Wagner West M.D.



REQUESTING PHYSICIAN:  Dave Christine M.D.



REASON FOR CONSULTATION:  Evaluation of anemia secondary to chemotherapy

and rash.



IDENTIFYING DATA:  Dear Dr. Christine,



The patient is a pleasant 32-year-old female with past medical history

significant for breast cancer, status post lumpectomy, getting

chemotherapy recently, came to the hospital with nausea, vomiting _____,

leukocytosis, and bacteremia. The patient upon workup noted to have UTI,

complained of discomfort, chest pain, _____.  Surgery called to evaluate

for potential removal of left chest port and recently _____ has been

receiving chemotherapy in the outpatient setting.  The patient is status

post removal postop day #2.  At this point, she continues to have anemia

with chemo rash.  Hematology Service consulted for further evaluation and

treatment.



PAST MEDICAL HISTORY:

1. Right breast cancer, status post lumpectomy.

2. History of  and tubal ligation.

3. Status post Port-A-Cath placement.



ALLERGIES:  No known drug allergies.



SOCIAL HISTORY:  Nonsmoker.



FAMILY HISTORY:  Noncontributory.



REVIEW OF SYSTEMS:  CONSTITUTIONAL:  No fever, chills, or night sweats.

SKIN:  No rashes, bumps, or itching.  HEENT:  No headache, hearing or

vision changes.  BREASTS:  No lumps, pain, or discharge.  PULMONARY:  No

cough, sputum or shortness of breath.  GASTROINTESTINAL:  No nausea,

vomiting, or diarrhea.  GENITOURINARY:  No dysuria, frequency, or urgency.

MUSCULOSKELETAL:  No joint swelling, muscle pain, or trauma.



PHYSICAL EXAMINATION:

VITAL SIGNS:  Reviewed.

GENERAL:  No acute distress.

PULMONARY:  Decreased breath sounds.

CARDIOVASCULAR:  Regular rate.  No S3 or S4.

ABDOMEN:  Soft, nontender, and nondistended.

EXTREMITIES:  A 1+ edema.



LABORATORY AND DIAGNOSTIC DATA:  Hemoglobin 9.9, WBC of 12.5, and platelet

count 261,000.  _____.



ASSESSMENT AND RECOMMENDATIONS:

1. Pancytopenia likely secondary to recent chemotherapy.  In addition,

the patient with infection.  The patient remains stable _____ to get home

health.

2. Breast cancer, status post chemotherapy with _____ nausea, vomiting,

and allergy reaction.

3. Septic thrombophlebitis.  The patient currently on broad-spectrum

antibiotics.  Continue to closely monitor.

4. Sepsis with bacteremia, has been seen by ID Service.

5. Nausea and vomiting, likely secondary to chemotherapy reaction.  We

will continue to closely follow.

6. Anemia secondary to iron deficiency.  Continue the patient on IV iron

_____ .

7. _____ dehydration.  Continue fluids as needed.









  ______________________________________________

  Wagner West M.D.





DR:  ALESHA

D:  2018 16:53

T:  2018 02:03

JOB#:  [id number]

CC:

## 2018-01-26 NOTE — GENERAL PROGRESS NOTE
Assessment/Plan


Status:  unchanged


Assessment/Plan


1. Pancytopenia likely secondary to recent chemotherapy.  In addition,


the patient with infection.  The patient remains stable 


--> Monitor closely. 


2. Breast cancer, status post chemotherapy with nausea, vomiting, and allergy 

reaction.


--> Cont. treatment as outpatient. 


3. Septic thrombophlebitis.  


--> The patient currently on broad-spectrum antibiotics.  


--> Continue to closely monitor.


4. Sepsis with bacteremia, has been seen by ID Service.


--> On vancomycin


5. Nausea and vomiting, likely secondary to chemotherapy reaction.  


--> We will continue to closely follow.


6. Anemia secondary to iron deficiency.  


--> Continue the patient on IV iron


--> Monitor levels.


7. Dehydration.  


--> Continue fluids as needed.





Subjective


Constitutional:  Denies: no symptoms, chills, diaphoresis, fever, malaise, 

weakness, other


HEENT:  Denies: no symptoms, eye pain, blurred vision, tearing, double vision, 

ear pain, ear discharge, nose pain, nose congestion, throat pain, throat 

swelling, mouth pain, mouth swelling, other


Cardiovascular:  Denies: no symptoms, chest pain, edema, irregular heart rate, 

lightheadedness, palpitations, syncope, other


Respiratory:  Denies: no symptoms, cough, orthopnea, shortness of breath, SOB 

with excertion, SOB at rest, sputum, stridor, wheezing, other


Gastrointestinal/Abdominal:  Denies: no symptoms, abdomen distended, abdominal 

pain, black stools, tarry stools, blood in stool, constipated, diarrhea, 

difficulty swallowing, nausea, poor appetite, poor fluid intake, rectal bleeding

, vomiting, other


Genitourinary:  Denies: no symptoms, burning, discharge, frequency, flank pain, 

hematuria, incontinence, pain, urgency, other


Hematologic/Lymphatic:  Reports: anemia, other


Allergies:  


Coded Allergies:  


     No Known Allergies (Unverified , 10/12/17)


Subjective


Ongoing antibiotic IV. Generalized weakness. Lethargic.





Objective





Last 24 Hour Vital Signs








  Date Time  Temp Pulse Resp B/P (MAP) Pulse Ox O2 Delivery O2 Flow Rate FiO2


 


1/26/18 12:30 98.8 91 18 111/75 99   


 


1/26/18 08:05 98.1 100 17 113/72 99   


 


1/26/18 06:06 100.4       


 


1/26/18 04:00 100.4 101 18 106/63 99   


 


1/26/18 00:00 99.1 94 20 115/77 94   


 


1/25/18 22:07 98.0       


 


1/25/18 20:00 102.0 102 18 112/66 97   


 


1/25/18 16:00 98.9 99 19 116/74 98   

















Intake and Output  


 


 1/25/18 1/26/18





 19:00 07:00


 


Intake Total 720 ml 320 ml


 


Balance 720 ml 320 ml


 


  


 


Intake Oral 720 ml 320 ml


 


# Voids  2








Laboratory Tests


1/26/18 06:40: 


White Blood Count 12.9H, Red Blood Count 3.53L, Hemoglobin 10.3L, Hematocrit 

31.9L, Mean Corpuscular Volume 90, Mean Corpuscular Hemoglobin 29.2, Mean 

Corpuscular Hemoglobin Concent 32.3, Red Cell Distribution Width 16.4H, 

Platelet Count 333, Mean Platelet Volume 6.8, Neutrophils (%) (Auto) 77.7H, 

Lymphocytes (%) (Auto) 14.7L, Monocytes (%) (Auto) 7.0, Eosinophils (%) (Auto) 

0.1, Basophils (%) (Auto) 0.5, Sodium Level 135L, Potassium Level 3.8, Chloride 

Level 99, Carbon Dioxide Level 25, Anion Gap 11, Blood Urea Nitrogen 6L, 

Creatinine 0.8, Estimat Glomerular Filtration Rate > 60, Glucose Level 129H, 

Calcium Level 9.7


Height (Feet):  5


Height (Inches):  7.00


Weight (Pounds):  200


General Appearance:  lethargic, confused


Respiratory/Chest:  decreased breath sounds











Wagner West Jan 26, 2018 14:51

## 2018-01-26 NOTE — INTERNAL MED PROGRESS NOTE
Subjective


Date of Service:  Jan 26, 2018


Physician Name


Kylah Olson


Attending Physician


Dave Christine MD





Current Medications








 Medications


  (Trade)  Dose


 Ordered  Sig/Indira


 Route


 PRN Reason  Start Time


 Stop Time Status Last Admin


Dose Admin


 


 Acetaminophen


  (Tylenol)  650 mg  Q4H  PRN


 ORAL


 fever  1/19/18 05:30


 2/18/18 05:29  1/25/18 21:08


 


 


 Al Hydroxide/Mg


 Hydroxide


  (Mylanta II)  30 ml  Q6H  PRN


 ORAL


 dyspepsia  1/19/18 05:30


 2/18/18 05:29   


 


 


 Cefazolin Sodium  50 ml @ 


 100 mls/hr  Q6HR


 IV


   1/26/18 13:00


 2/2/18 12:59  1/26/18 15:12


 


 


 Chlorhexidine


 Gluconate


  (Payal-Hex 2%)  1 applic  DAILY@2000


 TOPIC


   1/26/18 20:00


 2/25/18 19:59   


 


 


 Dextrose


  (Dextrose 50%)    STAT  PRN


 IV


 Hypoglycemia  1/19/18 05:30


 2/18/18 05:29   


 


 


 Diphenhydramine


 HCl


  (Benadryl)  25 mg  Q6H  PRN


 ORAL


 Itching/Pruritis  1/19/18 05:30


 2/18/18 05:29   


 


 


 Folic Acid


  (Folate)  1 mg  DAILY


 ORAL


   1/22/18 09:00


 2/21/18 08:59  1/26/18 09:05


 


 


 Heparin Sodium/


 Sodium Chloride


  (Heparin 2000


 units/Ns 1000ml


 premix)  2,000 unit  ONCE


 INJ


   1/26/18 11:00


 1/26/18 23:00   


 


 


 Lidocaine HCl


  (Xylocaine 1%


 30ml)  30 ml  ONCE


 INJ


   1/26/18 11:00


 1/26/18 23:00   


 


 


 Morphine Sulfate


  (Morphine


 Sulfate)  4 mg  Q4H  PRN


 IVP


 Severe Pain (Pain Scale 7-10)  1/20/18 19:45


 1/27/18 19:44  1/26/18 17:29


 


 


 Nitroglycerin


  (Ntg)  0.4 mg  Q5M X 3 DOSES PRN


 SL


 Prn Chest Pain  1/19/18 05:30


 2/18/18 05:29   


 


 


 Ondansetron HCl


  (Zofran)  4 mg  Q6H  PRN


 IVP


 Nausea & Vomiting  1/19/18 05:30


 2/18/18 05:29  1/25/18 23:33


 


 


 Pantoprazole


  (Protonix)  40 mg  DAILY


 IV


   1/19/18 09:00


 2/18/18 08:59  1/26/18 09:05


 


 


 Polyethylene


 Glycol


  (Miralax)  17 gm  HSPRN  PRN


 ORAL


 Constipation  1/19/18 05:30


 2/18/18 05:29  1/25/18 06:16


 


 


 Promethazine HCl


  (Phenergan)  25 mg  Q8H  PRN


 ORAL


 Nausea & Vomiting  1/19/18 10:45


 2/18/18 10:44   


 








Allergies:  


Coded Allergies:  


     No Known Allergies (Unverified , 10/12/17)


ROS Limited/Unobtainable:  No


Constitutional:  Reports: fever


HEENT:  Reports: no symptoms


Cardiovascular:  Reports: no symptoms


Respiratory:  Reports: no symptoms


Gastrointestinal/Abdominal:  Reports: no symptoms


Genitourinary:  Reports: no symptoms


Neurologic/Psychiatric:  Reports: no symptoms


Subjective


33 YO F admitted with nausea and vomiting.  Now sepsis-await Transesophageal 

echocardiogram.  Low grade fever overnight.  S/P Port A Cath removal 1/22/17.  S

/P PICC line.  Cover for Int Med-Dr Christine





Objective





Last Vital Signs








  Date Time  Temp Pulse Resp B/P (MAP) Pulse Ox O2 Delivery O2 Flow Rate FiO2


 


1/26/18 16:00 98.9 95 19 115/70 98   


 


1/24/18 16:00      Room Air  











Laboratory Tests








Test


  1/26/18


06:40


 


White Blood Count


  12.9 K/UL


(4.8-10.8)  H


 


Red Blood Count


  3.53 M/UL


(4.20-5.40)  L


 


Hemoglobin


  10.3 G/DL


(12.0-16.0)  L


 


Hematocrit


  31.9 %


(37.0-47.0)  L


 


Mean Corpuscular Volume 90 FL (80-99)  


 


Mean Corpuscular Hemoglobin


  29.2 PG


(27.0-31.0)


 


Mean Corpuscular Hemoglobin


Concent 32.3 G/DL


(32.0-36.0)


 


Red Cell Distribution Width


  16.4 %


(11.6-14.8)  H


 


Platelet Count


  333 K/UL


(150-450)


 


Mean Platelet Volume


  6.8 FL


(6.5-10.1)


 


Neutrophils (%) (Auto)


  77.7 %


(45.0-75.0)  H


 


Lymphocytes (%) (Auto)


  14.7 %


(20.0-45.0)  L


 


Monocytes (%) (Auto)


  7.0 %


(1.0-10.0)


 


Eosinophils (%) (Auto)


  0.1 %


(0.0-3.0)


 


Basophils (%) (Auto)


  0.5 %


(0.0-2.0)


 


Sodium Level


  135 MMOL/L


(136-145)  L


 


Potassium Level


  3.8 MMOL/L


(3.5-5.1)


 


Chloride Level


  99 MMOL/L


()


 


Carbon Dioxide Level


  25 MMOL/L


(21-32)


 


Anion Gap


  11 mmol/L


(5-15)


 


Blood Urea Nitrogen


  6 mg/dL (7-18)


L


 


Creatinine


  0.8 MG/DL


(0.55-1.30)


 


Estimat Glomerular Filtration


Rate > 60 mL/min


(>60)


 


Glucose Level


  129 MG/DL


()  H


 


Calcium Level


  9.7 MG/DL


(8.5-10.1)











Microbiology








 Date/Time


Source Procedure


Growth Status


 


 


 1/24/18 05:05


Blood Blood Culture - Preliminary


Staphylococcus Aureus Resulted


 


 1/24/18 05:05


Blood Blood Culture - Preliminary


NO GROWTH AFTER 24 HOURS Resulted

















Intake and Output  


 


 1/25/18 1/26/18





 19:00 07:00


 


Intake Total 720 ml 320 ml


 


Balance 720 ml 320 ml


 


  


 


Intake Oral 720 ml 320 ml


 


# Voids  2








Objective


General Appearance:  WD/WN, alert, moderate distress


EENT:  PERRL/EOMI, normal ENT inspection


Neck:  non-tender, normal alignment, supple, normal inspection


Cardiovascular:  normal peripheral pulses, normal rate, regular rhythm, no 

gallop/murmur, no JVD


Respiratory/Chest:  chest wall non-tender, lungs clear, normal breath sounds, 

no respiratory distress, no accessory muscle use


Abdomen:  normal bowel sounds, non tender, soft, no organomegaly, no mass


Extremities:  normal range of motion, non-tender


Neurologic:  CNs II-XII grossly normal, no motor/sensory deficits


Skin:  normal pigmentation, warm/dry





Assessment/Plan


Problem List:  


(1) Nausea & vomiting


Assessment & Plan:  continue zofran





(2) Leukocytosis


Assessment & Plan:  Due to sepsis.  Antibiotic per ID





(3) Bloodstream infection due to port-a-cath


Assessment & Plan:  S/P removal port A Cath on 1/22/18.  See Surgery note.  

Vanco day #6/28 per ID.  Will require PICC for 28-42 day antibiotic therapy





(4) Sepsis


Assessment & Plan:  Staph aureus.  Await transesophageal echo to R/O 

endocarditis.  Continue vanco Day # 7/28-42 per ID





(5) UTI (urinary tract infection)


(6) Breast CA


Assessment & Plan:  Currently on chemotherapy





Status:  not improved











KYLAH OLSON Jan 26, 2018 18:15

## 2018-01-26 NOTE — INFECTIOUS DISEASES PROG NOTE
Assessment/Plan


Assessment/Plan


ASSESSMENT:  The patient is a 32-year-old female with,











Fever


Nausea, vomiting/diarrhea (probably due to chemo vs related to sepsis)


   -Abd US: Hepatosplenomegaly.Otherwise, unremarkable abdominal sonogram.


Sepsis , SP 


Bacteremia Staph A /  line infection, / septic thrombophlebitis 


  2d Echo no vegetation


  wnd Cx ( over the port) : MSSA 


  SP removal or portacath    Surg Site cx : Staph A.


  CT: Implanted infusion port in the left chest. No definite fluid collection 

around the port. However, there appears to be occlusion of the left jugular 

vein as well as the left brachiocephalic vein with stranding around the 

brachiocephalic vein. This suggests thrombophlebitis.


  - BCx 2/4 GPC (ID and sensi pending)


Leukocytosis, improving


Fever, improving


    -u/a wbc 5/10


    -Influenza neg


    -CXR no focal consolidation


Elevated LFTs, improving





- Right breast mastectomy, status post lumpectomy.


-. History of  and tubal ligation.


-. Status post Port-A-Cath placement.








PLAN:











continue  IV Vancomycin d#  / -42 ( need long term AB, 2/2 septic 

thrombophlebitis )


    -  SP  Zosyn # 4 


    - SP Ceftriaxone x1





f/u cx ( Bl ) 


Monitor CBC and CMP 


if persistent bacteremia : will need RICHARD





Subjective


Constitutional:  Denies: no symptoms, fever, chills, fatigue, anorexia, 

drenching sweats, other


Allergies:  


Coded Allergies:  


     No Known Allergies (Unverified , 10/12/17)


Subjective








fever





Objective


Vital Signs





Last 24 Hour Vital Signs








  Date Time  Temp Pulse Resp B/P (MAP) Pulse Ox O2 Delivery O2 Flow Rate FiO2


 


18 08:05 98.1 100 17 113/72 99   


 


18 06:06 100.4       


 


18 04:00 100.4 101 18 106/63 99   


 


18 00:00 99.1 94 20 115/77 94   


 


18 22:07 98.0       


 


18 20:00 102.0 102 18 112/66 97   


 


18 16:00 98.9 99 19 116/74 98   


 


18 12:00 98.5 96 18 122/79 97   








Height (Feet):  5


Height (Inches):  7.00


Weight (Pounds):  200


HEENT:  anicteric


Respiratory/Chest:  normal breath sounds


Cardiovascular:  regular rhythm


Abdomen:  no organomegaly





Microbiology








 Date/Time


Source Procedure


Growth Status


 


 


 18 05:05


Blood Blood Culture - Preliminary


Staphylococcus Aureus Resulted


 


 18 05:05


Blood Blood Culture - Preliminary


NO GROWTH AFTER 24 HOURS Resulted











Laboratory Tests








Test


  18


06:40


 


White Blood Count


  12.9 K/UL


(4.8-10.8)  H


 


Red Blood Count


  3.53 M/UL


(4.20-5.40)  L


 


Hemoglobin


  10.3 G/DL


(12.0-16.0)  L


 


Hematocrit


  31.9 %


(37.0-47.0)  L


 


Mean Corpuscular Volume 90 FL (80-99)  


 


Mean Corpuscular Hemoglobin


  29.2 PG


(27.0-31.0)


 


Mean Corpuscular Hemoglobin


Concent 32.3 G/DL


(32.0-36.0)


 


Red Cell Distribution Width


  16.4 %


(11.6-14.8)  H


 


Platelet Count


  333 K/UL


(150-450)


 


Mean Platelet Volume


  6.8 FL


(6.5-10.1)


 


Neutrophils (%) (Auto)


  77.7 %


(45.0-75.0)  H


 


Lymphocytes (%) (Auto)


  14.7 %


(20.0-45.0)  L


 


Monocytes (%) (Auto)


  7.0 %


(1.0-10.0)


 


Eosinophils (%) (Auto)


  0.1 %


(0.0-3.0)


 


Basophils (%) (Auto)


  0.5 %


(0.0-2.0)


 


Sodium Level


  135 MMOL/L


(136-145)  L


 


Potassium Level


  3.8 MMOL/L


(3.5-5.1)


 


Chloride Level


  99 MMOL/L


()


 


Carbon Dioxide Level


  25 MMOL/L


(21-32)


 


Anion Gap


  11 mmol/L


(5-15)


 


Blood Urea Nitrogen


  6 mg/dL (7-18)


L


 


Creatinine


  0.8 MG/DL


(0.55-1.30)


 


Estimat Glomerular Filtration


Rate > 60 mL/min


(>60)


 


Glucose Level


  129 MG/DL


()  H


 


Calcium Level


  9.7 MG/DL


(8.5-10.1)











Current Medications








 Medications


  (Trade)  Dose


 Ordered  Sig/Indira


 Route


 PRN Reason  Start Time


 Stop Time Status Last Admin


Dose Admin


 


 Acetaminophen


  (Tylenol)  650 mg  Q4H  PRN


 ORAL


 fever  18 05:30


 18 05:29  18 21:08


 


 


 Al Hydroxide/Mg


 Hydroxide


  (Mylanta II)  30 ml  Q6H  PRN


 ORAL


 dyspepsia  18 05:30


 18 05:29   


 


 


 Chlorhexidine


 Gluconate


  (Payal-Hex 2%)  1 applic  DAILY@2000


 TOPIC


   18 20:00


 18 19:59   


 


 


 Dextrose


  (Dextrose 50%)    STAT  PRN


 IV


 Hypoglycemia  18 05:30


 18 05:29   


 


 


 Diphenhydramine


 HCl


  (Benadryl)  25 mg  Q6H  PRN


 ORAL


 Itching/Pruritis  18 05:30


 18 05:29   


 


 


 Folic Acid


  (Folate)  1 mg  DAILY


 ORAL


   18 09:00


 18 08:59  18 09:05


 


 


 Heparin Sodium/


 Sodium Chloride


  (Heparin 2000


 units/Ns 1000ml


 premix)  2,000 unit  ONCE


 INJ


   18 11:00


 18 23:00   


 


 


 Lidocaine HCl


  (Xylocaine 1%


 30ml)  30 ml  ONCE


 INJ


   18 11:00


 18 23:00   


 


 


 Morphine Sulfate


  (Morphine


 Sulfate)  4 mg  Q4H  PRN


 IVP


 Severe Pain (Pain Scale 7-10)  18 19:45


 18 19:44  18 10:53


 


 


 Nitroglycerin


  (Ntg)  0.4 mg  Q5M X 3 DOSES PRN


 SL


 Prn Chest Pain  18 05:30


 18 05:29   


 


 


 Ondansetron HCl


  (Zofran)  4 mg  Q6H  PRN


 IVP


 Nausea & Vomiting  18 05:30


 18 05:29  18 23:33


 


 


 Pantoprazole


  (Protonix)  40 mg  DAILY


 IV


   18 09:00


 18 08:59  18 09:05


 


 


 Polyethylene


 Glycol


  (Miralax)  17 gm  HSPRN  PRN


 ORAL


 Constipation  18 05:30


 2/18/18 05:29  18 06:16


 


 


 Promethazine HCl


  (Phenergan)  25 mg  Q8H  PRN


 ORAL


 Nausea & Vomiting  18 10:45


 18 10:44   


 


 


 Vancomycin HCl


  (Vanco rx to


 dose)  1 ea  DAILY  PRN


 MISC


 Per rx protocol  18 09:15


 18 09:14   


 


 


 Vancomycin HCl/


 Dextrose  250 ml @ 


 166.667


 mls/hr  Q6HR


 IVPB


   18 15:00


 18 14:59  18 05:27


 

















ERIC GARCIA M.D. 2018 11:34

## 2018-01-27 VITALS — DIASTOLIC BLOOD PRESSURE: 86 MMHG | SYSTOLIC BLOOD PRESSURE: 133 MMHG

## 2018-01-27 VITALS — DIASTOLIC BLOOD PRESSURE: 78 MMHG | SYSTOLIC BLOOD PRESSURE: 114 MMHG

## 2018-01-27 VITALS — SYSTOLIC BLOOD PRESSURE: 98 MMHG | DIASTOLIC BLOOD PRESSURE: 61 MMHG

## 2018-01-27 VITALS — SYSTOLIC BLOOD PRESSURE: 109 MMHG | DIASTOLIC BLOOD PRESSURE: 68 MMHG

## 2018-01-27 VITALS — DIASTOLIC BLOOD PRESSURE: 74 MMHG | SYSTOLIC BLOOD PRESSURE: 123 MMHG

## 2018-01-27 VITALS — SYSTOLIC BLOOD PRESSURE: 127 MMHG | DIASTOLIC BLOOD PRESSURE: 73 MMHG

## 2018-01-27 LAB
ADD MANUAL DIFF: NO
ANION GAP SERPL CALC-SCNC: 12 MMOL/L (ref 5–15)
BASOPHILS NFR BLD AUTO: 0.7 % (ref 0–2)
BUN SERPL-MCNC: 6 MG/DL (ref 7–18)
CALCIUM SERPL-MCNC: 9.6 MG/DL (ref 8.5–10.1)
CHLORIDE SERPL-SCNC: 99 MMOL/L (ref 98–107)
CO2 SERPL-SCNC: 24 MMOL/L (ref 21–32)
CREAT SERPL-MCNC: 1 MG/DL (ref 0.55–1.3)
EOSINOPHIL NFR BLD AUTO: 0.1 % (ref 0–3)
ERYTHROCYTE [DISTWIDTH] IN BLOOD BY AUTOMATED COUNT: 16.6 % (ref 11.6–14.8)
HCT VFR BLD CALC: 30.2 % (ref 37–47)
HGB BLD-MCNC: 9.8 G/DL (ref 12–16)
LYMPHOCYTES NFR BLD AUTO: 15.9 % (ref 20–45)
MCV RBC AUTO: 90 FL (ref 80–99)
MONOCYTES NFR BLD AUTO: 10 % (ref 1–10)
NEUTROPHILS NFR BLD AUTO: 73.3 % (ref 45–75)
PLATELET # BLD: 352 K/UL (ref 150–450)
POTASSIUM SERPL-SCNC: 4 MMOL/L (ref 3.5–5.1)
RBC # BLD AUTO: 3.35 M/UL (ref 4.2–5.4)
SODIUM SERPL-SCNC: 135 MMOL/L (ref 136–145)
WBC # BLD AUTO: 12 K/UL (ref 4.8–10.8)

## 2018-01-27 RX ADMIN — CEFAZOLIN SODIUM SCH MLS/HR: 1 SOLUTION INTRAVENOUS at 11:48

## 2018-01-27 RX ADMIN — DAPTOMYCIN SCH MLS/HR: 500 INJECTION, POWDER, LYOPHILIZED, FOR SOLUTION INTRAVENOUS at 14:58

## 2018-01-27 RX ADMIN — CEFAZOLIN SODIUM SCH MLS/HR: 1 SOLUTION INTRAVENOUS at 00:15

## 2018-01-27 RX ADMIN — CEFAZOLIN SODIUM SCH MLS/HR: 1 SOLUTION INTRAVENOUS at 06:33

## 2018-01-27 RX ADMIN — MORPHINE SULFATE PRN MG: 4 INJECTION INTRAVENOUS at 11:55

## 2018-01-27 RX ADMIN — CEFAZOLIN SODIUM SCH MLS/HR: 1 SOLUTION INTRAVENOUS at 18:29

## 2018-01-27 RX ADMIN — MORPHINE SULFATE PRN MG: 4 INJECTION INTRAVENOUS at 06:33

## 2018-01-27 RX ADMIN — CHLORHEXIDINE GLUCONATE SCH APPLIC: 213 SOLUTION TOPICAL at 20:34

## 2018-01-27 RX ADMIN — MORPHINE SULFATE PRN MG: 4 INJECTION INTRAVENOUS at 17:29

## 2018-01-27 RX ADMIN — PANTOPRAZOLE SODIUM SCH MG: 40 INJECTION, POWDER, FOR SOLUTION INTRAVENOUS at 08:48

## 2018-01-27 NOTE — INTERNAL MED PROGRESS NOTE
Subjective


Date of Service:  Jan 27, 2018


Physician Name


Kylah Olson


Attending Physician


Dave Christine MD





Current Medications








 Medications


  (Trade)  Dose


 Ordered  Sig/Indira


 Route


 PRN Reason  Start Time


 Stop Time Status Last Admin


Dose Admin


 


 Acetaminophen


  (Tylenol)  650 mg  Q4H  PRN


 ORAL


 fever  1/19/18 05:30


 2/18/18 05:29  1/25/18 21:08


 


 


 Al Hydroxide/Mg


 Hydroxide


  (Mylanta II)  30 ml  Q6H  PRN


 ORAL


 dyspepsia  1/19/18 05:30


 2/18/18 05:29   


 


 


 Cefazolin Sodium  50 ml @ 


 100 mls/hr  Q6HR


 IV


   1/26/18 13:00


 2/2/18 12:59  1/27/18 11:48


 


 


 Chlorhexidine


 Gluconate


  (Payal-Hex 2%)  1 applic  DAILY@2000


 TOPIC


   1/26/18 20:00


 2/25/18 19:59  1/26/18 22:08


 


 


 Daptomycin 600 mg/


 Sodium Chloride  55 ml @ 


 100 mls/hr  Q24H


 IV


   1/27/18 15:00


 2/3/18 14:59  1/27/18 14:58


 


 


 Dextrose


  (Dextrose 50%)    STAT  PRN


 IV


 Hypoglycemia  1/19/18 05:30


 2/18/18 05:29   


 


 


 Diphenhydramine


 HCl


  (Benadryl)  25 mg  Q6H  PRN


 ORAL


 Itching/Pruritis  1/19/18 05:30


 2/18/18 05:29   


 


 


 Folic Acid


  (Folate)  1 mg  DAILY


 ORAL


   1/22/18 09:00


 2/21/18 08:59  1/27/18 08:48


 


 


 Morphine Sulfate


  (Morphine


 Sulfate)  4 mg  Q4H  PRN


 IVP


 Severe Pain (Pain Scale 7-10)  1/20/18 19:45


 1/27/18 19:44  1/27/18 11:55


 


 


 Nitroglycerin


  (Ntg)  0.4 mg  Q5M X 3 DOSES PRN


 SL


 Prn Chest Pain  1/19/18 05:30


 2/18/18 05:29   


 


 


 Ondansetron HCl


  (Zofran)  4 mg  Q6H  PRN


 IVP


 Nausea & Vomiting  1/19/18 05:30


 2/18/18 05:29  1/27/18 06:33


 


 


 Pantoprazole


  (Protonix)  40 mg  DAILY


 IV


   1/19/18 09:00


 2/18/18 08:59  1/27/18 08:48


 


 


 Polyethylene


 Glycol


  (Miralax)  17 gm  HSPRN  PRN


 ORAL


 Constipation  1/19/18 05:30


 2/18/18 05:29  1/25/18 06:16


 


 


 Promethazine HCl


  (Phenergan)  25 mg  Q8H  PRN


 ORAL


 Nausea & Vomiting  1/19/18 10:45


 2/18/18 10:44   


 








Allergies:  


Coded Allergies:  


     No Known Allergies (Unverified , 10/12/17)


ROS Limited/Unobtainable:  No


Constitutional:  Reports: no symptoms


HEENT:  Reports: no symptoms


Cardiovascular:  Reports: no symptoms


Respiratory:  Reports: no symptoms


Gastrointestinal/Abdominal:  Reports: no symptoms


Genitourinary:  Reports: no symptoms


Neurologic/Psychiatric:  Reports: no symptoms


Subjective


31 YO F admitted with nausea and vomiting.  Now sepsis-await Transesophageal 

echocardiogram.  S/P Port A Cath removal 1/22/17.  S/P PICC line.  Low grade 

fever overnight.  Cover for Int [facility]-Dr Christine





Objective





Last Vital Signs








  Date Time  Temp Pulse Resp B/P (MAP) Pulse Ox O2 Delivery O2 Flow Rate FiO2


 


1/27/18 12:25 97.9       


 


1/27/18 12:00  100 18 127/73 99   


 


1/24/18 16:00      Room Air  











Laboratory Tests








Test


  1/27/18


07:15


 


White Blood Count


  12.0 K/UL


(4.8-10.8)  H


 


Red Blood Count


  3.35 M/UL


(4.20-5.40)  L


 


Hemoglobin


  9.8 G/DL


(12.0-16.0)  L


 


Hematocrit


  30.2 %


(37.0-47.0)  L


 


Mean Corpuscular Volume 90 FL (80-99)  


 


Mean Corpuscular Hemoglobin


  29.3 PG


(27.0-31.0)


 


Mean Corpuscular Hemoglobin


Concent 32.6 G/DL


(32.0-36.0)


 


Red Cell Distribution Width


  16.6 %


(11.6-14.8)  H


 


Platelet Count


  352 K/UL


(150-450)


 


Mean Platelet Volume


  6.8 FL


(6.5-10.1)


 


Neutrophils (%) (Auto)


  73.3 %


(45.0-75.0)


 


Lymphocytes (%) (Auto)


  15.9 %


(20.0-45.0)  L


 


Monocytes (%) (Auto)


  10.0 %


(1.0-10.0)


 


Eosinophils (%) (Auto)


  0.1 %


(0.0-3.0)


 


Basophils (%) (Auto)


  0.7 %


(0.0-2.0)


 


Sodium Level


  135 MMOL/L


(136-145)  L


 


Potassium Level


  4.0 MMOL/L


(3.5-5.1)


 


Chloride Level


  99 MMOL/L


()


 


Carbon Dioxide Level


  24 MMOL/L


(21-32)


 


Anion Gap


  12 mmol/L


(5-15)


 


Blood Urea Nitrogen


  6 mg/dL (7-18)


L


 


Creatinine


  1.0 MG/DL


(0.55-1.30)


 


Estimat Glomerular Filtration


Rate > 60 mL/min


(>60)


 


Glucose Level


  110 MG/DL


()  H


 


Calcium Level


  9.6 MG/DL


(8.5-10.1)

















Intake and Output  


 


 1/26/18 1/27/18





 19:00 07:00


 


Intake Total 750 ml 360 ml


 


Balance 750 ml 360 ml


 


  


 


Intake Oral 750 ml 360 ml


 


# Voids 4 








Objective


General Appearance:  WD/WN, alert, moderate distress


EENT:  PERRL/EOMI, normal ENT inspection


Neck:  non-tender, normal alignment, supple, normal inspection


Cardiovascular:  normal peripheral pulses, normal rate, regular rhythm, no 

gallop/murmur, no JVD


Respiratory/Chest:  chest wall non-tender, lungs clear, normal breath sounds, 

no respiratory distress, no accessory muscle use


Abdomen:  normal bowel sounds, non tender, soft, no organomegaly, no mass


Extremities:  normal range of motion, non-tender


Neurologic:  CNs II-XII grossly normal, no motor/sensory deficits


Skin:  normal pigmentation, warm/dry





Assessment/Plan


Problem List:  


(1) Nausea & vomiting


Assessment & Plan:  continue zofran





(2) Leukocytosis


Assessment & Plan:  Due to sepsis.  Antibiotic per ID





(3) Bloodstream infection due to port-a-cath


Assessment & Plan:  S/P removal port A Cath on 1/22/18.  See Surgery note.  

Vanco day #6/28 per ID.  Will require PICC for 28-42 day antibiotic therapy





(4) Sepsis


Assessment & Plan:  Staph aureus.  Await transesophageal echo to R/O 

endocarditis.  Continue vanco Day # 7/28-42 per ID





(5) UTI (urinary tract infection)


(6) Breast CA


Assessment & Plan:  Currently on chemotherapy





(7) Fever


Assessment & Plan:  see ID note.














KYLAH OLSON Jan 27, 2018 15:17

## 2018-01-27 NOTE — INFECTIOUS DISEASES PROG NOTE
Assessment/Plan


Assessment/Plan


ASSESSMENT:  The patient is a 32-year-old female with,











Fever


Nausea, vomiting/diarrhea (probably due to chemo vs related to sepsis)


   -Abd US: Hepatosplenomegaly.Otherwise, unremarkable abdominal sonogram.


Sepsis , SP 


Bacteremia Staph A /  line infection, / persistent due to septic 

thrombophlebitis  , ( all surg, Wnd  and blood cx are : MSSA , repeat Culture 

on  is MRSA ? , asked micro to re-run susceptibility) 


  2d Echo no vegetation


  wnd Cx ( over the port) : MSSA 


  SP removal or portacath    Surg Site cx : Staph A.


  CT: Implanted infusion port in the left chest. No definite fluid collection 

around the port. However, there appears to be occlusion of the left jugular 

vein as well as the left brachiocephalic vein with stranding around the 

brachiocephalic vein. This suggests thrombophlebitis.


  - BCx 2/4 GPC (ID and sensi pending)


Leukocytosis, improving


Fever, improving


    -u/a wbc 5/10


    -Influenza neg


    -CXR no focal consolidation


Elevated LFTs, improving





- Right breast mastectomy, status post lumpectomy.


-. History of  and tubal ligation.


-. Status post Port-A-Cath placement.








PLAN:











continue  IV  Ancef  ( AB Rx d#  ) ( need long term AB, 2/2 septic 

thrombophlebitis ) , add Dapto 8mg/Kg for synergy for possible MRSA 


    - SP Vancomycin d # 1


    -  SP  Zosyn # 4 


    - SP Ceftriaxone x1





f/u cx ( Bl ) 


Monitor CBC and CMP , ck


if persistent bacteremia :   RICHARD is optional to Ro jeffery-valve abscess 


Asked micro to re-run susceptibility on blood Culture date  : MRSA ?





Subjective


Allergies:  


Coded Allergies:  


     No Known Allergies (Unverified , 10/12/17)


Subjective








fever





Objective


Vital Signs





Last 24 Hour Vital Signs








  Date Time  Temp Pulse Resp B/P (MAP) Pulse Ox O2 Delivery O2 Flow Rate FiO2


 


18 08:00 98.0 99 18 114/78 95   


 


18 04:00 98.1 106 19 109/68 99   


 


18 00:00 98.7 110 18 98/61 97   


 


18 20:19 100.1 107 20 116/80 100   


 


18 16:00 98.9 95 19 115/70 98   


 


18 12:30 98.8 91 18 111/75 99   








Height (Feet):  5


Height (Inches):  7.00


Weight (Pounds):  200


HEENT:  anicteric


Respiratory/Chest:  no respiratory distress


Cardiovascular:  regularly irregular


Abdomen:  no mass





Laboratory Tests








Test


  18


07:15


 


White Blood Count


  12.0 K/UL


(4.8-10.8)  H


 


Red Blood Count


  3.35 M/UL


(4.20-5.40)  L


 


Hemoglobin


  9.8 G/DL


(12.0-16.0)  L


 


Hematocrit


  30.2 %


(37.0-47.0)  L


 


Mean Corpuscular Volume 90 FL (80-99)  


 


Mean Corpuscular Hemoglobin


  29.3 PG


(27.0-31.0)


 


Mean Corpuscular Hemoglobin


Concent 32.6 G/DL


(32.0-36.0)


 


Red Cell Distribution Width


  16.6 %


(11.6-14.8)  H


 


Platelet Count


  352 K/UL


(150-450)


 


Mean Platelet Volume


  6.8 FL


(6.5-10.1)


 


Neutrophils (%) (Auto)


  73.3 %


(45.0-75.0)


 


Lymphocytes (%) (Auto)


  15.9 %


(20.0-45.0)  L


 


Monocytes (%) (Auto)


  10.0 %


(1.0-10.0)


 


Eosinophils (%) (Auto)


  0.1 %


(0.0-3.0)


 


Basophils (%) (Auto)


  0.7 %


(0.0-2.0)


 


Sodium Level


  135 MMOL/L


(136-145)  L


 


Potassium Level


  4.0 MMOL/L


(3.5-5.1)


 


Chloride Level


  99 MMOL/L


()


 


Carbon Dioxide Level


  24 MMOL/L


(21-32)


 


Anion Gap


  12 mmol/L


(5-15)


 


Blood Urea Nitrogen


  6 mg/dL (7-18)


L


 


Creatinine


  1.0 MG/DL


(0.55-1.30)


 


Estimat Glomerular Filtration


Rate > 60 mL/min


(>60)


 


Glucose Level


  110 MG/DL


()  H


 


Calcium Level


  9.6 MG/DL


(8.5-10.1)











Current Medications








 Medications


  (Trade)  Dose


 Ordered  Sig/Indira


 Route


 PRN Reason  Start Time


 Stop Time Status Last Admin


Dose Admin


 


 Acetaminophen


  (Tylenol)  650 mg  Q4H  PRN


 ORAL


 fever  18 05:30


 18 05:29  18 21:08


 


 


 Al Hydroxide/Mg


 Hydroxide


  (Mylanta II)  30 ml  Q6H  PRN


 ORAL


 dyspepsia  18 05:30


 18 05:29   


 


 


 Cefazolin Sodium  50 ml @ 


 100 mls/hr  Q6HR


 IV


   18 13:00


 18 12:59  18 11:48


 


 


 Chlorhexidine


 Gluconate


  (Payal-Hex 2%)  1 applic  DAILY@2000


 TOPIC


   18 20:00


 18 19:59  18 22:08


 


 


 Dextrose


  (Dextrose 50%)    STAT  PRN


 IV


 Hypoglycemia  18 05:30


 18 05:29   


 


 


 Diphenhydramine


 HCl


  (Benadryl)  25 mg  Q6H  PRN


 ORAL


 Itching/Pruritis  18 05:30


 18 05:29   


 


 


 Folic Acid


  (Folate)  1 mg  DAILY


 ORAL


   18 09:00


 18 08:59  18 08:48


 


 


 Morphine Sulfate


  (Morphine


 Sulfate)  4 mg  Q4H  PRN


 IVP


 Severe Pain (Pain Scale 7-10)  18 19:45


 18 19:44  18 11:55


 


 


 Nitroglycerin


  (Ntg)  0.4 mg  Q5M X 3 DOSES PRN


 SL


 Prn Chest Pain  18 05:30


 18 05:29   


 


 


 Ondansetron HCl


  (Zofran)  4 mg  Q6H  PRN


 IVP


 Nausea & Vomiting  18 05:30


 18 05:29  18 06:33


 


 


 Pantoprazole


  (Protonix)  40 mg  DAILY


 IV


   18 09:00


 18 08:59  18 08:48


 


 


 Polyethylene


 Glycol


  (Miralax)  17 gm  HSPRN  PRN


 ORAL


 Constipation  18 05:30


 18 05:29  18 06:16


 


 


 Promethazine HCl


  (Phenergan)  25 mg  Q8H  PRN


 ORAL


 Nausea & Vomiting  18 10:45


 18 10:44   


 

















ERIC GARCIA M.D. 2018 12:34

## 2018-01-27 NOTE — GENERAL PROGRESS NOTE
Assessment/Plan


Status:  stable


Assessment/Plan


1. Pancytopenia likely secondary to recent chemotherapy.  In addition,


the patient with infection.  The patient remains stable 


--> Monitor closely. Improving. 


2. Breast cancer, status post chemotherapy with nausea, vomiting, and allergy 

reaction.


--> Cont. treatment as outpatient. 


3. Septic thrombophlebitis.  


--> The patient currently on broad-spectrum antibiotics.  


--> Continue to closely monitor.


4. Sepsis with bacteremia, has been seen by ID Service.


--> On vancomycin


5. Nausea and vomiting, likely secondary to chemotherapy reaction.  


--> We will continue to closely follow.


6. Anemia secondary to iron deficiency.  


--> Continue the patient on IV iron


--> Monitor levels.


7. Dehydration.  


--> Continue fluids as needed.





Subjective


Date patient seen:  Jan 27, 2018


Constitutional:  Denies: no symptoms, chills, diaphoresis, fever, malaise, 

weakness, other


HEENT:  Denies: no symptoms, eye pain, blurred vision, tearing, double vision, 

ear pain, ear discharge, nose pain, nose congestion, throat pain, throat 

swelling, mouth pain, mouth swelling, other


Cardiovascular:  Denies: no symptoms, chest pain, edema, irregular heart rate, 

lightheadedness, palpitations, syncope, other


Respiratory:  Denies: no symptoms, cough, orthopnea, shortness of breath, SOB 

with excertion, SOB at rest, sputum, stridor, wheezing, other


Gastrointestinal/Abdominal:  Denies: no symptoms, abdomen distended, abdominal 

pain, black stools, tarry stools, blood in stool, constipated, diarrhea, 

difficulty swallowing, nausea, poor appetite, poor fluid intake, rectal bleeding

, vomiting, other


Genitourinary:  Denies: no symptoms, burning, discharge, frequency, flank pain, 

hematuria, incontinence, pain, urgency, other


Allergies:  


Coded Allergies:  


     No Known Allergies (Unverified , 10/12/17)


Subjective


On chemo.  Pancytopenia. Feeling better.





Objective





Last 24 Hour Vital Signs








  Date Time  Temp Pulse Resp B/P (MAP) Pulse Ox O2 Delivery O2 Flow Rate FiO2


 


1/27/18 20:25 101.1 114 20 123/74 97   


 


1/27/18 17:59 98.2       


 


1/27/18 15:53 98.2 99 20 133/86 100   


 


1/27/18 12:00 97.9 100 18 127/73 99   


 


1/27/18 08:00 98.0 99 18 114/78 95   


 


1/27/18 04:00 98.1 106 19 109/68 99   


 


1/27/18 00:00 98.7 110 18 98/61 97   

















Intake and Output  


 


 1/26/18 1/27/18





 19:00 07:00


 


Intake Total 750 ml 360 ml


 


Balance 750 ml 360 ml


 


  


 


Intake Oral 750 ml 360 ml


 


# Voids 4 








Laboratory Tests


1/27/18 07:15: 


White Blood Count 12.0H, Red Blood Count 3.35L, Hemoglobin 9.8L, Hematocrit 

30.2L, Mean Corpuscular Volume 90, Mean Corpuscular Hemoglobin 29.3, Mean 

Corpuscular Hemoglobin Concent 32.6, Red Cell Distribution Width 16.6H, 

Platelet Count 352, Mean Platelet Volume 6.8, Neutrophils (%) (Auto) 73.3, 

Lymphocytes (%) (Auto) 15.9L, Monocytes (%) (Auto) 10.0, Eosinophils (%) (Auto) 

0.1, Basophils (%) (Auto) 0.7, Sodium Level 135L, Potassium Level 4.0, Chloride 

Level 99, Carbon Dioxide Level 24, Anion Gap 12, Blood Urea Nitrogen 6L, 

Creatinine 1.0, Estimat Glomerular Filtration Rate > 60, Glucose Level 110H, 

Calcium Level 9.6


Height (Feet):  5


Height (Inches):  7.00


Weight (Pounds):  200


General Appearance:  lethargic


Abdomen:  non tender, soft


Edema:  trace edema


Skin:  warm/dry











Wagner West Jan 27, 2018 21:09

## 2018-01-27 NOTE — PULMONOLOGY PROGRESS NOTE
Assessment/Plan


Problems:  


(1) Bacteremia


Assessment & Plan:  persistent





(2) Septic thrombophlebitis


(3) Sepsis


(4) Chemotherapy induced nausea and vomiting


(5) Breast CA


(6) Bloodstream infection due to port-a-cath


Assessment/Plan


improving


on Cefazoline now


check cultures


all notes reviewed





pt will need RICHARD to rule out endocarditis, considering persistent Bacteremia





Subjective


Interval Events:  no new complains


Allergies:  


Coded Allergies:  


     No Known Allergies (Unverified , 10/12/17)





Objective





Last 24 Hour Vital Signs








  Date Time  Temp Pulse Resp B/P (MAP) Pulse Ox O2 Delivery O2 Flow Rate FiO2


 


1/27/18 04:00 98.1 106 19 109/68 99   


 


1/27/18 00:00 98.7 110 18 98/61 97   


 


1/26/18 20:19 100.1 107 20 116/80 100   


 


1/26/18 16:00 98.9 95 19 115/70 98   


 


1/26/18 12:30 98.8 91 18 111/75 99   


 


1/26/18 08:05 98.1 100 17 113/72 99   

















Intake and Output  


 


 1/26/18 1/27/18





 19:00 07:00


 


Intake Total 750 ml 360 ml


 


Balance 750 ml 360 ml


 


  


 


Intake Oral 750 ml 360 ml


 


# Voids 4 








Objective


General Appearance:  no acute distress, other - A/A/O x 3 morbidly obese AA 

male in NAD


HEENT:  normocephalic, atraumatic, anicteric, mucous membranes moist


Respiratory/Chest:  lungs clear -with moderate air exchange  no respiratory 

distress, no accessory muscle use, tenderness around Left upper chest area


Cardiovascular:  normal rate 


Abdomen:  normal bowel sounds, soft, non tender , obese


Extremities:  pedal pulses normal, ot


Neurologic/Psychiatric:  no motor/sensory deficits, alert, oriented x 3, 

responsive


Musculoskeletal:  normal muscle bulk


Laboratory Tests


1/27/18 07:15: 


White Blood Count [Pending], Red Blood Count [Pending], Hemoglobin [Pending], 

Hematocrit [Pending], Mean Corpuscular Volume [Pending], Mean Corpuscular 

Hemoglobin [Pending], Mean Corpuscular Hemoglobin Concent [Pending], Red Cell 

Distribution Width [Pending], Platelet Count [Pending], Mean Platelet Volume [

Pending], Neutrophils (%) (Auto) [Pending], Lymphocytes (%) (Auto) [Pending], 

Monocytes (%) (Auto) [Pending], Eosinophils (%) (Auto) [Pending], Basophils (%) 

(Auto) [Pending], Sodium Level [Pending], Potassium Level [Pending], Chloride 

Level [Pending], Carbon Dioxide Level [Pending], Blood Urea Nitrogen [Pending], 

Creatinine [Pending], Estimat Glomerular Filtration Rate [Pending], Glucose 

Level [Pending], Calcium Level [Pending]





Current Medications








 Medications


  (Trade)  Dose


 Ordered  Sig/Indira


 Route


 PRN Reason  Start Time


 Stop Time Status Last Admin


Dose Admin


 


 Acetaminophen


  (Tylenol)  650 mg  Q4H  PRN


 ORAL


 fever  1/19/18 05:30


 2/18/18 05:29  1/25/18 21:08


 


 


 Al Hydroxide/Mg


 Hydroxide


  (Mylanta II)  30 ml  Q6H  PRN


 ORAL


 dyspepsia  1/19/18 05:30


 2/18/18 05:29   


 


 


 Cefazolin Sodium  50 ml @ 


 100 mls/hr  Q6HR


 IV


   1/26/18 13:00


 2/2/18 12:59  1/27/18 06:33


 


 


 Chlorhexidine


 Gluconate


  (Payal-Hex 2%)  1 applic  DAILY@2000


 TOPIC


   1/26/18 20:00


 2/25/18 19:59  1/26/18 22:08


 


 


 Dextrose


  (Dextrose 50%)    STAT  PRN


 IV


 Hypoglycemia  1/19/18 05:30


 2/18/18 05:29   


 


 


 Diphenhydramine


 HCl


  (Benadryl)  25 mg  Q6H  PRN


 ORAL


 Itching/Pruritis  1/19/18 05:30


 2/18/18 05:29   


 


 


 Folic Acid


  (Folate)  1 mg  DAILY


 ORAL


   1/22/18 09:00


 2/21/18 08:59  1/26/18 09:05


 


 


 Morphine Sulfate


  (Morphine


 Sulfate)  4 mg  Q4H  PRN


 IVP


 Severe Pain (Pain Scale 7-10)  1/20/18 19:45


 1/27/18 19:44  1/27/18 06:33


 


 


 Nitroglycerin


  (Ntg)  0.4 mg  Q5M X 3 DOSES PRN


 SL


 Prn Chest Pain  1/19/18 05:30


 2/18/18 05:29   


 


 


 Ondansetron HCl


  (Zofran)  4 mg  Q6H  PRN


 IVP


 Nausea & Vomiting  1/19/18 05:30


 2/18/18 05:29  1/27/18 06:33


 


 


 Pantoprazole


  (Protonix)  40 mg  DAILY


 IV


   1/19/18 09:00


 2/18/18 08:59  1/26/18 09:05


 


 


 Polyethylene


 Glycol


  (Miralax)  17 gm  HSPRN  PRN


 ORAL


 Constipation  1/19/18 05:30


 2/18/18 05:29  1/25/18 06:16


 


 


 Promethazine HCl


  (Phenergan)  25 mg  Q8H  PRN


 ORAL


 Nausea & Vomiting  1/19/18 10:45


 2/18/18 10:44   


 

















JOE PRITCHETT Jan 27, 2018 08:00

## 2018-01-28 VITALS — DIASTOLIC BLOOD PRESSURE: 67 MMHG | SYSTOLIC BLOOD PRESSURE: 107 MMHG

## 2018-01-28 VITALS — SYSTOLIC BLOOD PRESSURE: 129 MMHG | DIASTOLIC BLOOD PRESSURE: 85 MMHG

## 2018-01-28 VITALS — SYSTOLIC BLOOD PRESSURE: 123 MMHG | DIASTOLIC BLOOD PRESSURE: 73 MMHG

## 2018-01-28 VITALS — SYSTOLIC BLOOD PRESSURE: 102 MMHG | DIASTOLIC BLOOD PRESSURE: 78 MMHG

## 2018-01-28 VITALS — SYSTOLIC BLOOD PRESSURE: 108 MMHG | DIASTOLIC BLOOD PRESSURE: 66 MMHG

## 2018-01-28 VITALS — DIASTOLIC BLOOD PRESSURE: 64 MMHG | SYSTOLIC BLOOD PRESSURE: 110 MMHG

## 2018-01-28 VITALS — DIASTOLIC BLOOD PRESSURE: 63 MMHG | SYSTOLIC BLOOD PRESSURE: 118 MMHG

## 2018-01-28 LAB
ADD MANUAL DIFF: NO
ALBUMIN SERPL-MCNC: 3.3 G/DL (ref 3.4–5)
ALBUMIN/GLOB SERPL: 0.6 {RATIO} (ref 1–2.7)
ALP SERPL-CCNC: 140 U/L (ref 46–116)
ALT SERPL-CCNC: 32 U/L (ref 12–78)
ANION GAP SERPL CALC-SCNC: 10 MMOL/L (ref 5–15)
AST SERPL-CCNC: 19 U/L (ref 15–37)
BASOPHILS NFR BLD AUTO: 0.8 % (ref 0–2)
BILIRUB SERPL-MCNC: 0.5 MG/DL (ref 0.2–1)
BUN SERPL-MCNC: 6 MG/DL (ref 7–18)
CALCIUM SERPL-MCNC: 9.8 MG/DL (ref 8.5–10.1)
CHLORIDE SERPL-SCNC: 100 MMOL/L (ref 98–107)
CK SERPL-CCNC: 34 U/L (ref 26–308)
CO2 SERPL-SCNC: 27 MMOL/L (ref 21–32)
CREAT SERPL-MCNC: 0.8 MG/DL (ref 0.55–1.3)
EOSINOPHIL NFR BLD AUTO: 0.1 % (ref 0–3)
ERYTHROCYTE [DISTWIDTH] IN BLOOD BY AUTOMATED COUNT: 16.3 % (ref 11.6–14.8)
GLOBULIN SER-MCNC: 5.4 G/DL
HCT VFR BLD CALC: 30.6 % (ref 37–47)
HGB BLD-MCNC: 10 G/DL (ref 12–16)
LYMPHOCYTES NFR BLD AUTO: 13 % (ref 20–45)
MCV RBC AUTO: 90 FL (ref 80–99)
MONOCYTES NFR BLD AUTO: 10.3 % (ref 1–10)
NEUTROPHILS NFR BLD AUTO: 75.9 % (ref 45–75)
PHOSPHATE SERPL-MCNC: 4.4 MG/DL (ref 2.5–4.9)
PLATELET # BLD: 335 K/UL (ref 150–450)
POTASSIUM SERPL-SCNC: 4 MMOL/L (ref 3.5–5.1)
RBC # BLD AUTO: 3.38 M/UL (ref 4.2–5.4)
SODIUM SERPL-SCNC: 136 MMOL/L (ref 136–145)
WBC # BLD AUTO: 13 K/UL (ref 4.8–10.8)

## 2018-01-28 RX ADMIN — CEFAZOLIN SODIUM SCH MLS/HR: 1 SOLUTION INTRAVENOUS at 19:06

## 2018-01-28 RX ADMIN — MORPHINE SULFATE PRN MG: 4 INJECTION INTRAVENOUS at 06:28

## 2018-01-28 RX ADMIN — MORPHINE SULFATE PRN MG: 4 INJECTION INTRAVENOUS at 20:16

## 2018-01-28 RX ADMIN — CEFAZOLIN SODIUM SCH MLS/HR: 1 SOLUTION INTRAVENOUS at 13:48

## 2018-01-28 RX ADMIN — DAPTOMYCIN SCH MLS/HR: 500 INJECTION, POWDER, LYOPHILIZED, FOR SOLUTION INTRAVENOUS at 15:53

## 2018-01-28 RX ADMIN — CEFAZOLIN SODIUM SCH MLS/HR: 1 SOLUTION INTRAVENOUS at 06:29

## 2018-01-28 RX ADMIN — PANTOPRAZOLE SODIUM SCH MG: 40 INJECTION, POWDER, FOR SOLUTION INTRAVENOUS at 08:42

## 2018-01-28 RX ADMIN — CEFAZOLIN SODIUM SCH MLS/HR: 1 SOLUTION INTRAVENOUS at 00:38

## 2018-01-28 RX ADMIN — CEFAZOLIN SODIUM SCH MLS/HR: 1 SOLUTION INTRAVENOUS at 23:56

## 2018-01-28 RX ADMIN — MORPHINE SULFATE PRN MG: 4 INJECTION INTRAVENOUS at 13:05

## 2018-01-28 RX ADMIN — CHLORHEXIDINE GLUCONATE SCH APPLIC: 213 SOLUTION TOPICAL at 20:15

## 2018-01-28 NOTE — INTERNAL MED PROGRESS NOTE
Subjective


Date of Service:  Jan 28, 2018


Physician Name


Kylah Olson


Attending Physician


Dave Christine MD





Current Medications








 Medications


  (Trade)  Dose


 Ordered  Sig/Indira


 Route


 PRN Reason  Start Time


 Stop Time Status Last Admin


Dose Admin


 


 Acetaminophen


  (Tylenol)  650 mg  Q4H  PRN


 ORAL


 fever  1/19/18 05:30


 2/18/18 05:29  1/27/18 20:35


 


 


 Al Hydroxide/Mg


 Hydroxide


  (Mylanta II)  30 ml  Q6H  PRN


 ORAL


 dyspepsia  1/19/18 05:30


 2/18/18 05:29   


 


 


 Cefazolin Sodium  50 ml @ 


 100 mls/hr  Q6HR


 IV


   1/26/18 13:00


 2/2/18 12:59  1/28/18 13:48


 


 


 Chlorhexidine


 Gluconate


  (Payal-Hex 2%)  1 applic  DAILY@2000


 TOPIC


   1/26/18 20:00


 2/25/18 19:59  1/27/18 20:34


 


 


 Daptomycin 600 mg/


 Sodium Chloride  55 ml @ 


 100 mls/hr  Q24H


 IV


   1/27/18 15:00


 2/3/18 14:59  1/27/18 14:58


 


 


 Dextrose


  (Dextrose 50%)    STAT  PRN


 IV


 Hypoglycemia  1/19/18 05:30


 2/18/18 05:29   


 


 


 Diphenhydramine


 HCl


  (Benadryl)  25 mg  Q6H  PRN


 ORAL


 Itching/Pruritis  1/19/18 05:30


 2/18/18 05:29   


 


 


 Folic Acid


  (Folate)  1 mg  DAILY


 ORAL


   1/22/18 09:00


 2/21/18 08:59  1/28/18 08:38


 


 


 Morphine Sulfate


  (Morphine


 Sulfate)  4 mg  Q4H  PRN


 IVP


 Severe Pain (Pain Scale 7-10)  1/27/18 21:45


 2/3/18 21:44  1/28/18 13:05


 


 


 Nitroglycerin


  (Ntg)  0.4 mg  Q5M X 3 DOSES PRN


 SL


 Prn Chest Pain  1/19/18 05:30


 2/18/18 05:29   


 


 


 Ondansetron HCl


  (Zofran)  4 mg  Q6H  PRN


 IVP


 Nausea & Vomiting  1/19/18 05:30


 2/18/18 05:29  1/27/18 06:33


 


 


 Pantoprazole


  (Protonix)  40 mg  DAILY


 IV


   1/19/18 09:00


 2/18/18 08:59  1/28/18 08:42


 


 


 Polyethylene


 Glycol


  (Miralax)  17 gm  HSPRN  PRN


 ORAL


 Constipation  1/19/18 05:30


 2/18/18 05:29  1/25/18 06:16


 


 


 Promethazine HCl


  (Phenergan)  25 mg  Q8H  PRN


 ORAL


 Nausea & Vomiting  1/19/18 10:45


 2/18/18 10:44   


 








Allergies:  


Coded Allergies:  


     No Known Allergies (Unverified , 10/12/17)


ROS Limited/Unobtainable:  No


Constitutional:  Reports: no symptoms


HEENT:  Reports: no symptoms


Cardiovascular:  Reports: no symptoms


Respiratory:  Reports: no symptoms


Gastrointestinal/Abdominal:  Reports: no symptoms


Genitourinary:  Reports: no symptoms


Neurologic/Psychiatric:  Reports: no symptoms


Subjective


33 YO F admitted with nausea and vomiting.  Now sepsis-await Transesophageal 

echocardiogram.  S/P Port A Cath removal 1/22/17.  S/P PICC line.  Low grade 

fever overnight.  Cover for Int Octaivo-Dr Christine





Objective





Last Vital Signs








  Date Time  Temp Pulse Resp B/P (MAP) Pulse Ox O2 Delivery O2 Flow Rate FiO2


 


1/28/18 13:43 98.2       


 


1/28/18 12:00  106  129/85    


 


1/28/18 08:00   22  98 Room Air  











Laboratory Tests








Test


  1/28/18


05:35


 


White Blood Count


  13.0 K/UL


(4.8-10.8)  H


 


Red Blood Count


  3.38 M/UL


(4.20-5.40)  L


 


Hemoglobin


  10.0 G/DL


(12.0-16.0)  L


 


Hematocrit


  30.6 %


(37.0-47.0)  L


 


Mean Corpuscular Volume 90 FL (80-99)  


 


Mean Corpuscular Hemoglobin


  29.7 PG


(27.0-31.0)


 


Mean Corpuscular Hemoglobin


Concent 32.8 G/DL


(32.0-36.0)


 


Red Cell Distribution Width


  16.3 %


(11.6-14.8)  H


 


Platelet Count


  335 K/UL


(150-450)


 


Mean Platelet Volume


  6.2 FL


(6.5-10.1)  L


 


Neutrophils (%) (Auto)


  75.9 %


(45.0-75.0)  H


 


Lymphocytes (%) (Auto)


  13.0 %


(20.0-45.0)  L


 


Monocytes (%) (Auto)


  10.3 %


(1.0-10.0)  H


 


Eosinophils (%) (Auto)


  0.1 %


(0.0-3.0)


 


Basophils (%) (Auto)


  0.8 %


(0.0-2.0)


 


Sodium Level


  136 MMOL/L


(136-145)


 


Potassium Level


  4.0 MMOL/L


(3.5-5.1)


 


Chloride Level


  100 MMOL/L


()


 


Carbon Dioxide Level


  27 MMOL/L


(21-32)


 


Anion Gap


  10 mmol/L


(5-15)


 


Blood Urea Nitrogen


  6 mg/dL (7-18)


L


 


Creatinine


  0.8 MG/DL


(0.55-1.30)


 


Estimat Glomerular Filtration


Rate > 60 mL/min


(>60)


 


Glucose Level


  105 MG/DL


()


 


Calcium Level


  9.8 MG/DL


(8.5-10.1)


 


Phosphorus Level


  4.4 MG/DL


(2.5-4.9)


 


Magnesium Level


  2.1 MG/DL


(1.8-2.4)


 


Total Bilirubin


  0.5 MG/DL


(0.2-1.0)


 


Aspartate Amino Transf


(AST/SGOT) 19 U/L (15-37)


 


 


Alanine Aminotransferase


(ALT/SGPT) 32 U/L (12-78)


 


 


Alkaline Phosphatase


  140 U/L


()  H


 


Total Creatine Kinase


  34 U/L


()


 


Total Protein


  8.7 G/DL


(6.4-8.2)  H


 


Albumin


  3.3 G/DL


(3.4-5.0)  L


 


Globulin 5.4 g/dL  


 


Albumin/Globulin Ratio


  0.6 (1.0-2.7)


L











Microbiology








 Date/Time


Source Procedure


Growth Status


 


 


 1/26/18 13:00


Blood Blood Culture - Preliminary


NO GROWTH AFTER 24 HOURS Resulted


 


 1/26/18 12:40


Blood Blood Culture - Preliminary


NO GROWTH AFTER 24 HOURS Resulted

















Intake and Output  


 


 1/27/18 1/28/18





 19:00 07:00


 


Intake Total 850 ml 


 


Balance 850 ml 


 


  


 


Intake Oral 800 ml 


 


IV Total 50 ml 


 


# Voids 3 5








Objective


General Appearance:  WD/WN, alert, moderate distress


EENT:  PERRL/EOMI, normal ENT inspection


Neck:  non-tender, normal alignment, supple, normal inspection


Cardiovascular:  normal peripheral pulses, normal rate, regular rhythm, no 

gallop/murmur, no JVD


Respiratory/Chest:  chest wall non-tender, lungs clear, normal breath sounds, 

no respiratory distress, no accessory muscle use


Abdomen:  normal bowel sounds, non tender, soft, no organomegaly, no mass


Extremities:  normal range of motion, non-tender


Neurologic:  CNs II-XII grossly normal, no motor/sensory deficits


Skin:  normal pigmentation, warm/dry





Assessment/Plan


Problem List:  


(1) Nausea & vomiting


Assessment & Plan:  continue zofran





(2) Leukocytosis


Assessment & Plan:  Due to sepsis.  Antibiotic per ID





(3) Bloodstream infection due to port-a-cath


Assessment & Plan:  S/P removal port A Cath on 1/22/18.  See Surgery note.  

Ancef day #9/42 per ID.  Will require PICC for 42 day antibiotic therapy





(4) Sepsis


Assessment & Plan:  Staph aureus.  Await transesophageal echo to R/O 

endocarditis.  Continue ancef Day # 9/42 per ID





(5) UTI (urinary tract infection)


(6) Breast CA


Assessment & Plan:  Currently on chemotherapy





(7) Fever


Assessment & Plan:  see ID note.





Status:  not improved











KYLAH OLSON Jan 28, 2018 14:47

## 2018-01-28 NOTE — GENERAL PROGRESS NOTE
Assessment/Plan


Status:  stable


Assessment/Plan


1. Pancytopenia likely secondary to recent chemotherapy.  In addition,


the patient with infection.  The patient remains stable 


--> Levels are improved. hemoglobin and platelets WNL


2. Breast cancer, status post chemotherapy with nausea, vomiting, and allergy 

reaction.


--> Cont. treatment as outpatient. 


3. Septic thrombophlebitis.  


--> The patient currently on broad-spectrum antibiotics.  


--> Continue to closely monitor.


4. Sepsis with bacteremia, has been seen by ID Service.


--> On vancomycin


5. Nausea and vomiting, likely secondary to chemotherapy reaction.  


--> We will continue to closely follow.


6. Anemia secondary to iron deficiency.  


--> Continue the patient on IV iron


--> Monitor levels.


7. Dehydration.  


--> Continue fluids as needed.





Subjective


Date patient seen:  Jan 28, 2018


Constitutional:  Denies: no symptoms, chills, diaphoresis, fever, malaise, 

weakness, other


HEENT:  Denies: no symptoms, eye pain, blurred vision, tearing, double vision, 

ear pain, ear discharge, nose pain, nose congestion, throat pain, throat 

swelling, mouth pain, mouth swelling, other


Cardiovascular:  Denies: no symptoms, chest pain, edema, irregular heart rate, 

lightheadedness, palpitations, syncope, other


Respiratory:  Denies: no symptoms, cough, orthopnea, shortness of breath, SOB 

with excertion, SOB at rest, sputum, stridor, wheezing, other


Gastrointestinal/Abdominal:  Denies: no symptoms, abdomen distended, abdominal 

pain, black stools, tarry stools, blood in stool, constipated, diarrhea, 

difficulty swallowing, nausea, poor appetite, poor fluid intake, rectal bleeding

, vomiting, other


Genitourinary:  Denies: no symptoms, burning, discharge, frequency, flank pain, 

hematuria, incontinence, pain, urgency, other


Allergies:  


Coded Allergies:  


     No Known Allergies (Unverified , 10/12/17)


Subjective


No major events. Resting in bed. No fever.





Objective





Last 24 Hour Vital Signs








  Date Time  Temp Pulse Resp B/P (MAP) Pulse Ox O2 Delivery O2 Flow Rate FiO2


 


1/28/18 20:46 98.1       


 


1/28/18 20:25 98.1 102 22 107/67 98   


 


1/28/18 16:48 89.9       


 


1/28/18 16:40 100.4 110 22 118/63  Room Air  


 


1/28/18 12:00 98.8 106  129/85    


 


1/28/18 08:00 98.2 106 22 123/73 98 Room Air  


 


1/28/18 04:00 98.1 98 19 108/66 100 Room Air  


 


1/28/18 00:13 98.1 87 20 110/64 97   

















Intake and Output  


 


 1/27/18 1/28/18





 19:00 07:00


 


Intake Total 850 ml 


 


Balance 850 ml 


 


  


 


Intake Oral 800 ml 


 


IV Total 50 ml 


 


# Voids 3 5








Laboratory Tests


1/28/18 05:35: 


White Blood Count 13.0H, Red Blood Count 3.38L, Hemoglobin 10.0L, Hematocrit 

30.6L, Mean Corpuscular Volume 90, Mean Corpuscular Hemoglobin 29.7, Mean 

Corpuscular Hemoglobin Concent 32.8, Red Cell Distribution Width 16.3H, 

Platelet Count 335, Mean Platelet Volume 6.2L, Neutrophils (%) (Auto) 75.9H, 

Lymphocytes (%) (Auto) 13.0L, Monocytes (%) (Auto) 10.3H, Eosinophils (%) (Auto

) 0.1, Basophils (%) (Auto) 0.8, Sodium Level 136, Potassium Level 4.0, 

Chloride Level 100, Carbon Dioxide Level 27, Anion Gap 10, Blood Urea Nitrogen 

6L, Creatinine 0.8, Estimat Glomerular Filtration Rate > 60, Glucose Level 105, 

Calcium Level 9.8, Phosphorus Level 4.4, Magnesium Level 2.1, Total Bilirubin 

0.5, Aspartate Amino Transf (AST/SGOT) 19, Alanine Aminotransferase (ALT/SGPT) 

32, Alkaline Phosphatase 140H, Total Creatine Kinase 34, Total Protein 8.7H, 

Albumin 3.3L, Globulin 5.4, Albumin/Globulin Ratio 0.6L


Height (Feet):  5


Height (Inches):  7.00


Weight (Pounds):  200


General Appearance:  lethargic


Respiratory/Chest:  decreased breath sounds


Abdomen:  soft











Wanger West Jan 28, 2018 21:20

## 2018-01-28 NOTE — PULMONOLOGY PROGRESS NOTE
Assessment/Plan


Problems:  


(1) Bacteremia


Assessment & Plan:  persistent





(2) Septic thrombophlebitis


(3) Sepsis


(4) Chemotherapy induced nausea and vomiting


(5) Breast CA


(6) Bloodstream infection due to port-a-cath


Assessment/Plan


improving


on Cefazoline now


check cultures


all notes reviewed





Subjective


ROS Limited/Unobtainable:  No


Allergies:  


Coded Allergies:  


     No Known Allergies (Unverified , 10/12/17)





Objective





Last 24 Hour Vital Signs








  Date Time  Temp Pulse Resp B/P (MAP) Pulse Ox O2 Delivery O2 Flow Rate FiO2


 


1/28/18 08:00 98.2 106 22 123/73 98 Room Air  


 


1/28/18 07:06 98.1       


 


1/28/18 04:00 98.1 98 19 108/66 100 Room Air  


 


1/28/18 00:13 98.1 87 20 110/64 97   


 


1/27/18 21:34 98.1       


 


1/27/18 20:25 101.1 114 20 123/74 97   


 


1/27/18 17:59 98.2       


 


1/27/18 15:53 98.2 99 20 133/86 100   


 


1/27/18 12:00 97.9 100 18 127/73 99   

















Intake and Output  


 


 1/27/18 1/28/18





 19:00 07:00


 


Intake Total 850 ml 


 


Balance 850 ml 


 


  


 


Intake Oral 800 ml 


 


IV Total 50 ml 


 


# Voids 3 5








Objective


General Appearance:  no acute distress, other - A/A/O x 3 morbidly obese AA 

male in NAD


HEENT:  normocephalic, atraumatic, anicteric, mucous membranes moist


Respiratory/Chest:  lungs clear -with moderate air exchange  no respiratory 

distress, no accessory muscle use, tenderness around Left upper chest area


Cardiovascular:  normal rate 


Abdomen:  normal bowel sounds, soft, non tender , obese


Extremities:  pedal pulses normal, ot


Neurologic/Psychiatric:  no motor/sensory deficits, alert, oriented x 3, 

responsive


Musculoskeletal:  normal muscle bulk





Microbiology








 Date/Time


Source Procedure


Growth Status


 


 


 1/26/18 13:00


Blood Blood Culture - Preliminary


NO GROWTH AFTER 24 HOURS Resulted


 


 1/26/18 12:40


Blood Blood Culture - Preliminary


NO GROWTH AFTER 24 HOURS Resulted








Laboratory Tests


1/28/18 05:35: 


White Blood Count 13.0H, Red Blood Count 3.38L, Hemoglobin 10.0L, Hematocrit 

30.6L, Mean Corpuscular Volume 90, Mean Corpuscular Hemoglobin 29.7, Mean 

Corpuscular Hemoglobin Concent 32.8, Red Cell Distribution Width 16.3H, 

Platelet Count 335, Mean Platelet Volume 6.2L, Neutrophils (%) (Auto) 75.9H, 

Lymphocytes (%) (Auto) 13.0L, Monocytes (%) (Auto) 10.3H, Eosinophils (%) (Auto

) 0.1, Basophils (%) (Auto) 0.8, Sodium Level 136, Potassium Level 4.0, 

Chloride Level 100, Carbon Dioxide Level 27, Anion Gap 10, Blood Urea Nitrogen 

6L, Creatinine 0.8, Estimat Glomerular Filtration Rate > 60, Glucose Level 105, 

Calcium Level 9.8, Phosphorus Level 4.4, Magnesium Level 2.1, Total Bilirubin 

0.5, Aspartate Amino Transf (AST/SGOT) 19, Alanine Aminotransferase (ALT/SGPT) 

32, Alkaline Phosphatase 140H, Total Creatine Kinase 34, Total Protein 8.7H, 

Albumin 3.3L, Globulin 5.4, Albumin/Globulin Ratio 0.6L





Current Medications








 Medications


  (Trade)  Dose


 Ordered  Sig/Indira


 Route


 PRN Reason  Start Time


 Stop Time Status Last Admin


Dose Admin


 


 Acetaminophen


  (Tylenol)  650 mg  Q4H  PRN


 ORAL


 fever  1/19/18 05:30


 2/18/18 05:29  1/27/18 20:35


 


 


 Al Hydroxide/Mg


 Hydroxide


  (Mylanta II)  30 ml  Q6H  PRN


 ORAL


 dyspepsia  1/19/18 05:30


 2/18/18 05:29   


 


 


 Cefazolin Sodium  50 ml @ 


 100 mls/hr  Q6HR


 IV


   1/26/18 13:00


 2/2/18 12:59  1/28/18 06:29


 


 


 Chlorhexidine


 Gluconate


  (Payal-Hex 2%)  1 applic  DAILY@2000


 TOPIC


   1/26/18 20:00


 2/25/18 19:59  1/27/18 20:34


 


 


 Daptomycin 600 mg/


 Sodium Chloride  55 ml @ 


 100 mls/hr  Q24H


 IV


   1/27/18 15:00


 2/3/18 14:59  1/27/18 14:58


 


 


 Dextrose


  (Dextrose 50%)    STAT  PRN


 IV


 Hypoglycemia  1/19/18 05:30


 2/18/18 05:29   


 


 


 Diphenhydramine


 HCl


  (Benadryl)  25 mg  Q6H  PRN


 ORAL


 Itching/Pruritis  1/19/18 05:30


 2/18/18 05:29   


 


 


 Folic Acid


  (Folate)  1 mg  DAILY


 ORAL


   1/22/18 09:00


 2/21/18 08:59  1/28/18 08:38


 


 


 Morphine Sulfate


  (Morphine


 Sulfate)  4 mg  Q4H  PRN


 IVP


 Severe Pain (Pain Scale 7-10)  1/27/18 21:45


 2/3/18 21:44  1/28/18 06:28


 


 


 Nitroglycerin


  (Ntg)  0.4 mg  Q5M X 3 DOSES PRN


 SL


 Prn Chest Pain  1/19/18 05:30


 2/18/18 05:29   


 


 


 Ondansetron HCl


  (Zofran)  4 mg  Q6H  PRN


 IVP


 Nausea & Vomiting  1/19/18 05:30


 2/18/18 05:29  1/27/18 06:33


 


 


 Pantoprazole


  (Protonix)  40 mg  DAILY


 IV


   1/19/18 09:00


 2/18/18 08:59  1/28/18 08:42


 


 


 Polyethylene


 Glycol


  (Miralax)  17 gm  HSPRN  PRN


 ORAL


 Constipation  1/19/18 05:30


 2/18/18 05:29  1/25/18 06:16


 


 


 Promethazine HCl


  (Phenergan)  25 mg  Q8H  PRN


 ORAL


 Nausea & Vomiting  1/19/18 10:45


 2/18/18 10:44   


 

















JOE PRITCHETT Jan 28, 2018 11:41

## 2018-01-29 VITALS — SYSTOLIC BLOOD PRESSURE: 114 MMHG | DIASTOLIC BLOOD PRESSURE: 69 MMHG

## 2018-01-29 VITALS — DIASTOLIC BLOOD PRESSURE: 81 MMHG | SYSTOLIC BLOOD PRESSURE: 120 MMHG

## 2018-01-29 VITALS — SYSTOLIC BLOOD PRESSURE: 102 MMHG | DIASTOLIC BLOOD PRESSURE: 70 MMHG

## 2018-01-29 VITALS — SYSTOLIC BLOOD PRESSURE: 113 MMHG | DIASTOLIC BLOOD PRESSURE: 78 MMHG

## 2018-01-29 VITALS — SYSTOLIC BLOOD PRESSURE: 108 MMHG | DIASTOLIC BLOOD PRESSURE: 70 MMHG

## 2018-01-29 LAB
ADD MANUAL DIFF: NO
ANION GAP SERPL CALC-SCNC: 11 MMOL/L (ref 5–15)
BASOPHILS NFR BLD AUTO: 0.6 % (ref 0–2)
BUN SERPL-MCNC: 6 MG/DL (ref 7–18)
CALCIUM SERPL-MCNC: 9.8 MG/DL (ref 8.5–10.1)
CHLORIDE SERPL-SCNC: 101 MMOL/L (ref 98–107)
CO2 SERPL-SCNC: 26 MMOL/L (ref 21–32)
CREAT SERPL-MCNC: 0.7 MG/DL (ref 0.55–1.3)
EOSINOPHIL NFR BLD AUTO: 0.1 % (ref 0–3)
ERYTHROCYTE [DISTWIDTH] IN BLOOD BY AUTOMATED COUNT: 16 % (ref 11.6–14.8)
HCT VFR BLD CALC: 30 % (ref 37–47)
HGB BLD-MCNC: 9.6 G/DL (ref 12–16)
LYMPHOCYTES NFR BLD AUTO: 18.6 % (ref 20–45)
MCV RBC AUTO: 91 FL (ref 80–99)
MONOCYTES NFR BLD AUTO: 11.2 % (ref 1–10)
NEUTROPHILS NFR BLD AUTO: 69.4 % (ref 45–75)
PLATELET # BLD: 327 K/UL (ref 150–450)
POTASSIUM SERPL-SCNC: 3.9 MMOL/L (ref 3.5–5.1)
RBC # BLD AUTO: 3.3 M/UL (ref 4.2–5.4)
SODIUM SERPL-SCNC: 137 MMOL/L (ref 136–145)
WBC # BLD AUTO: 10.8 K/UL (ref 4.8–10.8)

## 2018-01-29 RX ADMIN — CEFAZOLIN SODIUM SCH MLS/HR: 1 SOLUTION INTRAVENOUS at 23:55

## 2018-01-29 RX ADMIN — MORPHINE SULFATE PRN MG: 4 INJECTION INTRAVENOUS at 08:13

## 2018-01-29 RX ADMIN — CHLORHEXIDINE GLUCONATE SCH APPLIC: 213 SOLUTION TOPICAL at 20:14

## 2018-01-29 RX ADMIN — MORPHINE SULFATE PRN MG: 4 INJECTION INTRAVENOUS at 12:28

## 2018-01-29 RX ADMIN — PANTOPRAZOLE SODIUM SCH MG: 40 INJECTION, POWDER, FOR SOLUTION INTRAVENOUS at 09:00

## 2018-01-29 RX ADMIN — CEFAZOLIN SODIUM SCH MLS/HR: 1 SOLUTION INTRAVENOUS at 18:30

## 2018-01-29 RX ADMIN — CEFAZOLIN SODIUM SCH MLS/HR: 1 SOLUTION INTRAVENOUS at 06:04

## 2018-01-29 RX ADMIN — CEFAZOLIN SODIUM SCH MLS/HR: 1 SOLUTION INTRAVENOUS at 11:39

## 2018-01-29 RX ADMIN — MICONAZOLE NITRATE SCH APPLIC: 20 CREAM VAGINAL at 20:16

## 2018-01-29 RX ADMIN — MORPHINE SULFATE PRN MG: 4 INJECTION INTRAVENOUS at 20:15

## 2018-01-29 RX ADMIN — DAPTOMYCIN SCH MLS/HR: 500 INJECTION, POWDER, LYOPHILIZED, FOR SOLUTION INTRAVENOUS at 14:33

## 2018-01-29 NOTE — GENERAL PROGRESS NOTE
Assessment/Plan


Status:  unchanged


Assessment/Plan


#. Anemia secondary to iron deficiency.  


--> Continue the patient on IV iron


--> Monitor levels.


--> Blood transfusion not required unless symptomatic or hgb <7


#. Pancytopenia likely secondary to recent chemotherapy.  In addition,


the patient with infection.  The patient remains stable 


--> Levels are improved. wbc and platelets WNL. Hemoglobin stable. 


--> Monitor


#. Breast cancer, status post chemotherapy with nausea, vomiting, and allergy 

reaction.


--> Cont. treatment as outpatient. 


#. Septic thrombophlebitis.  


--> The patient currently on broad-spectrum antibiotics.  


--> Continue to closely monitor.


#. Sepsis with bacteremia, has been seen by ID Service.


--> On vancomycin


#. Nausea and vomiting, likely secondary to chemotherapy reaction.  


--> We will continue to closely follow.





#. Dehydration.  


--> Continue fluids as needed.





Subjective


Date patient seen:  Jan 29, 2018


Constitutional:  Denies: no symptoms, chills, diaphoresis, fever, malaise, 

weakness, other


HEENT:  Denies: no symptoms, eye pain, blurred vision, tearing, double vision, 

ear pain, ear discharge, nose pain, nose congestion, throat pain, throat 

swelling, mouth pain, mouth swelling, other


Cardiovascular:  Denies: no symptoms, chest pain, edema, irregular heart rate, 

lightheadedness, palpitations, syncope, other


Respiratory:  Denies: no symptoms, cough, orthopnea, shortness of breath, SOB 

with excertion, SOB at rest, sputum, stridor, wheezing, other


Gastrointestinal/Abdominal:  Denies: no symptoms, abdomen distended, abdominal 

pain, black stools, tarry stools, blood in stool, constipated, diarrhea, 

difficulty swallowing, nausea, poor appetite, poor fluid intake, rectal bleeding

, vomiting, other


Hematologic/Lymphatic:  Reports: anemia


Allergies:  


Coded Allergies:  


     No Known Allergies (Unverified , 10/12/17)


Subjective


Some tachycardia noted. No fever. H/H stable.





Objective





Last 24 Hour Vital Signs








  Date Time  Temp Pulse Resp B/P (MAP) Pulse Ox O2 Delivery O2 Flow Rate FiO2


 


1/29/18 20:45 98.6       


 


1/29/18 20:00 99.3 103 19 102/70 95   


 


1/29/18 17:25 98.6       


 


1/29/18 16:00 99.1 104 18 113/78 97 Room Air  


 


1/29/18 12:05 98.1 100 18 120/81 99 Room Air  


 


1/29/18 08:00 97.9 109 18 114/69 99 Room Air  


 


1/29/18 04:13 98.6 98 22 108/70 98   


 


1/29/18 04:00      Room Air  


 


1/29/18 00:11      Room Air  


 


1/28/18 23:59 100.9 111 22 102/78 98   

















Intake and Output  


 


 1/28/18 1/29/18





 19:00 07:00


 


Intake Total 750 ml 150 ml


 


Balance 750 ml 150 ml


 


  


 


Intake Oral 750 ml 


 


IV Total  150 ml


 


# Voids 2 3








Laboratory Tests


1/29/18 05:50: 


White Blood Count 10.8, Red Blood Count 3.30L, Hemoglobin 9.6L, Hematocrit 30.0L

, Mean Corpuscular Volume 91, Mean Corpuscular Hemoglobin 29.2, Mean 

Corpuscular Hemoglobin Concent 32.1, Red Cell Distribution Width 16.0H, 

Platelet Count 327, Mean Platelet Volume 7.0, Neutrophils (%) (Auto) 69.4, 

Lymphocytes (%) (Auto) 18.6L, Monocytes (%) (Auto) 11.2H, Eosinophils (%) (Auto

) 0.1, Basophils (%) (Auto) 0.6, Sodium Level 137, Potassium Level 3.9, 

Chloride Level 101, Carbon Dioxide Level 26, Anion Gap 11, Blood Urea Nitrogen 

6L, Creatinine 0.7, Estimat Glomerular Filtration Rate > 60, Glucose Level 96, 

Calcium Level 9.8


Height (Feet):  5


Height (Inches):  7.00


Weight (Pounds):  200


General Appearance:  confused


Cardiovascular:  tachycardia


Respiratory/Chest:  decreased breath sounds











Wagner West Jan 29, 2018 23:40

## 2018-01-29 NOTE — INFECTIOUS DISEASES PROG NOTE
Assessment/Plan


Assessment/Plan








Sepsis , SP 


Bacteremia Staph A /  line infection, / persistent due to septic 

thrombophlebitis  , ( all surg, Wnd  and blood cx are : MSSA , repeat Culture 

on  previously reported as MRSA, now corrected to MSSA) 


  2d Echo no vegetation


  -s/p port removal ; OR wnd Cx :+4 MSSA 


    -OR findings:The port had a capsule around it and a fluid collection within 

the capsule. Fluid collection was evacuated and fibrinous purulent exudate was 

taken.


   wnd Cx ( over the port) : MSSA


  CT: Implanted infusion port in the left chest. No definite fluid collection 

around the port. However, there appears to be occlusion of the left jugular 

vein as well as the left brachiocephalic vein with stranding around the 

brachiocephalic vein. This suggests thrombophlebitis.


  - BCx / MSSA, , , ; ,  NTD





Fever, improving


    -u/a wbc 5/10; Ucx mixed gram positive growth


    -Influenza neg


    -CXR no focal consolidation





Nausea, vomiting/diarrhea (probably due to chemo vs related to sepsis)


   -Abd US: Hepatosplenomegaly.Otherwise, unremarkable abdominal sonogram.





Leukocytosis, improving


Elevated LFTs, resolved








-s/p PICC line placement 





- Right breast mastectomy, status post lumpectomy.


-. History of  and tubal ligation.


-. Status post Port-A-Cath placement.








PLAN:





continue  IV  Ancef  ( AB Rx d# 10 ) will treat for 6 weeks from 1st negative 

BCx on  for septic thromboplebitis (end date 3/8/18) and d/c Daptomycin #3 

as corrected Bcx to MSSA (no presence fo MRSA)


  -weekly CBC, CMP


    - SP Vancomycin d # 7


    -  SP  Zosyn # 4 


    - SP Ceftriaxone x1





-Hold chemo until completion of  abx treatment.


f/u final cx ( Bl ) 


Monitor CBC and CMP 


if persistent bacteremia :   RICHARD is optional to Ro jeffery-valve abscess





Subjective


Allergies:  


Coded Allergies:  


     No Known Allergies (Unverified , 10/12/17)


Subjective


low grade fever last night,Tm 100.9, afebrile in >12hrs


leukocytosis improving


Bcx  and  NTD





Objective


Vital Signs





Last 24 Hour Vital Signs








  Date Time  Temp Pulse Resp B/P (MAP) Pulse Ox O2 Delivery O2 Flow Rate FiO2


 


18 16:00 99.1 104 18 113/78 97 Room Air  


 


18 12:05 98.1 100 18 120/81 99 Room Air  


 


18 08:00 97.9 109 18 114/69 99 Room Air  


 


18 04:13 98.6 98 22 108/70 98   


 


18 04:00      Room Air  


 


18 00:54 99.9       


 


18 00:11      Room Air  


 


18 23:59 100.9 111 22 102/78 98   


 


18 20:46 98.1       


 


18 20:25 98.1 102 22 107/67 98   


 


18 20:00      Room Air  








Height (Feet):  5


Height (Inches):  7.00


Weight (Pounds):  200


Objective


HEENT:  No pale conjunctivae.  No icterus.


NECK:  No lymphadenopathy.


CHEST:  Clear.


HEART:  S1 and S2.


ABDOMEN:  Soft and nontender.


EXTREMITIES:  No cyanosis at this time.


NEUROLOGIC:  Awake and alert.





Microbiology








 Date/Time


Source Procedure


Growth Status


 


 


 18 07:22


Blood Blood Culture - Preliminary


NO GROWTH AFTER 24 HOURS Resulted


 


 18 07:15


Blood Blood Culture - Preliminary


NO GROWTH AFTER 24 HOURS Resulted











Laboratory Tests








Test


  18


05:50


 


White Blood Count


  10.8 K/UL


(4.8-10.8)


 


Red Blood Count


  3.30 M/UL


(4.20-5.40)  L


 


Hemoglobin


  9.6 G/DL


(12.0-16.0)  L


 


Hematocrit


  30.0 %


(37.0-47.0)  L


 


Mean Corpuscular Volume 91 FL (80-99)  


 


Mean Corpuscular Hemoglobin


  29.2 PG


(27.0-31.0)


 


Mean Corpuscular Hemoglobin


Concent 32.1 G/DL


(32.0-36.0)


 


Red Cell Distribution Width


  16.0 %


(11.6-14.8)  H


 


Platelet Count


  327 K/UL


(150-450)


 


Mean Platelet Volume


  7.0 FL


(6.5-10.1)


 


Neutrophils (%) (Auto)


  69.4 %


(45.0-75.0)


 


Lymphocytes (%) (Auto)


  18.6 %


(20.0-45.0)  L


 


Monocytes (%) (Auto)


  11.2 %


(1.0-10.0)  H


 


Eosinophils (%) (Auto)


  0.1 %


(0.0-3.0)


 


Basophils (%) (Auto)


  0.6 %


(0.0-2.0)


 


Sodium Level


  137 MMOL/L


(136-145)


 


Potassium Level


  3.9 MMOL/L


(3.5-5.1)


 


Chloride Level


  101 MMOL/L


()


 


Carbon Dioxide Level


  26 MMOL/L


(21-32)


 


Anion Gap


  11 mmol/L


(5-15)


 


Blood Urea Nitrogen


  6 mg/dL (7-18)


L


 


Creatinine


  0.7 MG/DL


(0.55-1.30)


 


Estimat Glomerular Filtration


Rate > 60 mL/min


(>60)


 


Glucose Level


  96 MG/DL


()


 


Calcium Level


  9.8 MG/DL


(8.5-10.1)











Current Medications








 Medications


  (Trade)  Dose


 Ordered  Sig/Indira


 Route


 PRN Reason  Start Time


 Stop Time Status Last Admin


Dose Admin


 


 Acetaminophen


  (Tylenol)  650 mg  Q4H  PRN


 ORAL


 fever  18 05:30


 18 05:29  18 16:26


 


 


 Al Hydroxide/Mg


 Hydroxide


  (Mylanta II)  30 ml  Q6H  PRN


 ORAL


 dyspepsia  18 05:30


 18 05:29   


 


 


 Cefazolin Sodium  50 ml @ 


 100 mls/hr  Q6HR


 IV


   18 13:00


 18 12:59  18 11:39


 


 


 Chlorhexidine


 Gluconate


  (Payal-Hex 2%)  1 applic  DAILY@2000


 TOPIC


   18 20:00


 18 19:59  18 20:15


 


 


 Daptomycin 600 mg/


 Sodium Chloride  55 ml @ 


 100 mls/hr  Q24H


 IV


   18 15:00


 2/3/18 14:59  18 14:33


 


 


 Dextrose


  (Dextrose 50%)    STAT  PRN


 IV


 Hypoglycemia  18 05:30


 18 05:29   


 


 


 Diphenhydramine


 HCl


  (Benadryl)  25 mg  Q6H  PRN


 ORAL


 Itching/Pruritis  18 05:30


 18 05:29   


 


 


 Folic Acid


  (Folate)  1 mg  DAILY


 ORAL


   18 09:00


 18 08:59  18 09:00


 


 


 Miconazole Nitrate


  (Monistat)  1 applic  BEDTIME


 VAGIN


   18 21:00


 18 20:59   


 


 


 Morphine Sulfate


  (Morphine


 Sulfate)  4 mg  Q4H  PRN


 IVP


 Severe Pain (Pain Scale 7-10)  18 21:45


 2/3/18 21:44  18 12:28


 


 


 Nitroglycerin


  (Ntg)  0.4 mg  Q5M X 3 DOSES PRN


 SL


 Prn Chest Pain  18 05:30


 18 05:29   


 


 


 Ondansetron HCl


  (Zofran)  4 mg  Q6H  PRN


 IVP


 Nausea & Vomiting  18 05:30


 18 05:29  18 06:33


 


 


 Pantoprazole


  (Protonix)  40 mg  DAILY


 IV


   18 09:00


 18 08:59  18 09:00


 


 


 Polyethylene


 Glycol


  (Miralax)  17 gm  HSPRN  PRN


 ORAL


 Constipation  18 05:30


 18 05:29  18 06:16


 


 


 Promethazine HCl


  (Phenergan)  25 mg  Q8H  PRN


 ORAL


 Nausea & Vomiting  18 10:45


 18 10:44   


 

















Karol Burton M.D. 2018 17:37

## 2018-01-29 NOTE — PULMONOLOGY PROGRESS NOTE
Assessment/Plan


Problems:  


(1) Bacteremia


Assessment & Plan:  persistent





(2) Septic thrombophlebitis


(3) Sepsis


(4) Chemotherapy induced nausea and vomiting


(5) Breast CA


(6) Bloodstream infection due to port-a-cath


Assessment/Plan


improving


on Cefazoline now


check cultures


all notes reviewed


dc planning in progress/





Subjective


ROS Limited/Unobtainable:  No


Allergies:  


Coded Allergies:  


     No Known Allergies (Unverified , 10/12/17)





Objective





Last 24 Hour Vital Signs








  Date Time  Temp Pulse Resp B/P (MAP) Pulse Ox O2 Delivery O2 Flow Rate FiO2


 


1/29/18 16:00 99.1 104 18 113/78 97 Room Air  


 


1/29/18 12:05 98.1 100 18 120/81 99 Room Air  


 


1/29/18 08:00 97.9 109 18 114/69 99 Room Air  


 


1/29/18 04:13 98.6 98 22 108/70 98   


 


1/29/18 04:00      Room Air  


 


1/29/18 00:54 99.9       


 


1/29/18 00:11      Room Air  


 


1/28/18 23:59 100.9 111 22 102/78 98   


 


1/28/18 20:46 98.1       


 


1/28/18 20:25 98.1 102 22 107/67 98   


 


1/28/18 20:00      Room Air  

















Intake and Output  


 


 1/28/18 1/29/18





 19:00 07:00


 


Intake Total 750 ml 150 ml


 


Balance 750 ml 150 ml


 


  


 


Intake Oral 750 ml 


 


IV Total  150 ml


 


# Voids 2 3








Objective


General Appearance:  no acute distress, other - A/A/O x 3 morbidly obese AA 

male in NAD


HEENT:  normocephalic, atraumatic, anicteric, mucous membranes moist


Respiratory/Chest:  lungs clear -with moderate air exchange  no respiratory 

distress, no accessory muscle use, tenderness around Left upper chest area


Cardiovascular:  normal rate 


Abdomen:  normal bowel sounds, soft, non tender , obese


Extremities:  pedal pulses normal, ot


Neurologic/Psychiatric:  no motor/sensory deficits, alert, oriented x 3, 

responsive


Musculoskeletal:  normal muscle bulk





Microbiology








 Date/Time


Source Procedure


Growth Status


 


 


 1/27/18 07:22


Blood Blood Culture - Preliminary


NO GROWTH AFTER 24 HOURS Resulted


 


 1/27/18 07:15


Blood Blood Culture - Preliminary


NO GROWTH AFTER 24 HOURS Resulted








Laboratory Tests


1/29/18 05:50: 


White Blood Count 10.8, Red Blood Count 3.30L, Hemoglobin 9.6L, Hematocrit 30.0L

, Mean Corpuscular Volume 91, Mean Corpuscular Hemoglobin 29.2, Mean 

Corpuscular Hemoglobin Concent 32.1, Red Cell Distribution Width 16.0H, 

Platelet Count 327, Mean Platelet Volume 7.0, Neutrophils (%) (Auto) 69.4, 

Lymphocytes (%) (Auto) 18.6L, Monocytes (%) (Auto) 11.2H, Eosinophils (%) (Auto

) 0.1, Basophils (%) (Auto) 0.6, Sodium Level 137, Potassium Level 3.9, 

Chloride Level 101, Carbon Dioxide Level 26, Anion Gap 11, Blood Urea Nitrogen 

6L, Creatinine 0.7, Estimat Glomerular Filtration Rate > 60, Glucose Level 96, 

Calcium Level 9.8





Current Medications








 Medications


  (Trade)  Dose


 Ordered  Sig/Indira


 Route


 PRN Reason  Start Time


 Stop Time Status Last Admin


Dose Admin


 


 Acetaminophen


  (Tylenol)  650 mg  Q4H  PRN


 ORAL


 fever  1/19/18 05:30


 2/18/18 05:29  1/29/18 16:26


 


 


 Al Hydroxide/Mg


 Hydroxide


  (Mylanta II)  30 ml  Q6H  PRN


 ORAL


 dyspepsia  1/19/18 05:30


 2/18/18 05:29   


 


 


 Cefazolin Sodium  50 ml @ 


 100 mls/hr  Q6HR


 IV


   1/26/18 13:00


 2/2/18 12:59  1/29/18 11:39


 


 


 Chlorhexidine


 Gluconate


  (Payal-Hex 2%)  1 applic  DAILY@2000


 TOPIC


   1/26/18 20:00


 2/25/18 19:59  1/28/18 20:15


 


 


 Daptomycin 600 mg/


 Sodium Chloride  55 ml @ 


 100 mls/hr  Q24H


 IV


   1/27/18 15:00


 2/3/18 14:59  1/29/18 14:33


 


 


 Dextrose


  (Dextrose 50%)    STAT  PRN


 IV


 Hypoglycemia  1/19/18 05:30


 2/18/18 05:29   


 


 


 Diphenhydramine


 HCl


  (Benadryl)  25 mg  Q6H  PRN


 ORAL


 Itching/Pruritis  1/19/18 05:30


 2/18/18 05:29   


 


 


 Folic Acid


  (Folate)  1 mg  DAILY


 ORAL


   1/22/18 09:00


 2/21/18 08:59  1/29/18 09:00


 


 


 Miconazole Nitrate


  (Monistat)  1 applic  BEDTIME


 VAGIN


   1/29/18 21:00


 2/5/18 20:59   


 


 


 Morphine Sulfate


  (Morphine


 Sulfate)  4 mg  Q4H  PRN


 IVP


 Severe Pain (Pain Scale 7-10)  1/27/18 21:45


 2/3/18 21:44  1/29/18 12:28


 


 


 Nitroglycerin


  (Ntg)  0.4 mg  Q5M X 3 DOSES PRN


 SL


 Prn Chest Pain  1/19/18 05:30


 2/18/18 05:29   


 


 


 Ondansetron HCl


  (Zofran)  4 mg  Q6H  PRN


 IVP


 Nausea & Vomiting  1/19/18 05:30


 2/18/18 05:29  1/27/18 06:33


 


 


 Pantoprazole


  (Protonix)  40 mg  DAILY


 IV


   1/19/18 09:00


 2/18/18 08:59  1/29/18 09:00


 


 


 Polyethylene


 Glycol


  (Miralax)  17 gm  HSPRN  PRN


 ORAL


 Constipation  1/19/18 05:30


 2/18/18 05:29  1/25/18 06:16


 


 


 Promethazine HCl


  (Phenergan)  25 mg  Q8H  PRN


 ORAL


 Nausea & Vomiting  1/19/18 10:45


 2/18/18 10:44   


 

















JOE PRITCHETT Jan 29, 2018 16:54

## 2018-01-29 NOTE — GI PROGRESS NOTE
Assessment/Plan


Problems:  


(1) Bloodstream infection due to port-a-cath


ICD Codes:  T80.211A - Bloodstream infection due to central venous catheter, 

initial encounter


SNOMED:  598074716


(2) Nausea & vomiting


ICD Codes:  R11.2 - Nausea with vomiting, unspecified


SNOMED:  58301779


(3) Breast CA


ICD Codes:  C50.919 - Malignant neoplasm of unspecified site of unspecified 

female breast


SNOMED:  776851784


(4) Abnormal LFTs


ICD Codes:  R94.5 - Abnormal results of liver function studies


SNOMED:  854839893


(5) Chemotherapy induced nausea and vomiting


ICD Codes:  R11.2 - Nausea with vomiting, unspecified; T45.1X5A - Adverse 

effect of antineoplastic and immunosuppressive drugs, initial encounter


SNOMED:  766570782


(6) Dehydration


ICD Codes:  E86.0 - Dehydration


SNOMED:  33989912


(7) Electrolyte imbalance


ICD Codes:  E87.8 - Other disorders of electrolyte and fluid balance, not 

elsewhere classified


SNOMED:  328003027


Status:  stable


Status Narrative


Discussed with Dr. Marie.


Assessment/Plan


CT reviewed, see full report >>  suggests thrombophlebitis.


iron deficiency >> venofer





okay for DC per GI standpoint


adv to regular diet, tolerating


PICC placed


pain control


Phenergan and zofran prn


fu labs





Subjective


Subjective


N/V/D resolved





Objective





Last 24 Hour Vital Signs








  Date Time  Temp Pulse Resp B/P (MAP) Pulse Ox O2 Delivery O2 Flow Rate FiO2


 


1/29/18 08:00 97.9 109 18 114/69 99 Room Air  


 


1/29/18 04:13 98.6 98 22 108/70 98   


 


1/29/18 04:00      Room Air  


 


1/29/18 00:54 99.9       


 


1/29/18 00:11      Room Air  


 


1/28/18 23:59 100.9 111 22 102/78 98   


 


1/28/18 20:46 98.1       


 


1/28/18 20:25 98.1 102 22 107/67 98   


 


1/28/18 20:00      Room Air  


 


1/28/18 16:40 100.4 110 22 118/63  Room Air  


 


1/28/18 12:00 98.8 106  129/85    

















Intake and Output  


 


 1/28/18 1/29/18





 19:00 07:00


 


Intake Total 750 ml 150 ml


 


Balance 750 ml 150 ml


 


  


 


Intake Oral 750 ml 


 


IV Total  150 ml


 


# Voids 2 3











Laboratory Tests








Test


  1/29/18


05:50


 


White Blood Count


  10.8 K/UL


(4.8-10.8)


 


Red Blood Count


  3.30 M/UL


(4.20-5.40)  L


 


Hemoglobin


  9.6 G/DL


(12.0-16.0)  L


 


Hematocrit


  30.0 %


(37.0-47.0)  L


 


Mean Corpuscular Volume 91 FL (80-99)  


 


Mean Corpuscular Hemoglobin


  29.2 PG


(27.0-31.0)


 


Mean Corpuscular Hemoglobin


Concent 32.1 G/DL


(32.0-36.0)


 


Red Cell Distribution Width


  16.0 %


(11.6-14.8)  H


 


Platelet Count


  327 K/UL


(150-450)


 


Mean Platelet Volume


  7.0 FL


(6.5-10.1)


 


Neutrophils (%) (Auto)


  69.4 %


(45.0-75.0)


 


Lymphocytes (%) (Auto)


  18.6 %


(20.0-45.0)  L


 


Monocytes (%) (Auto)


  11.2 %


(1.0-10.0)  H


 


Eosinophils (%) (Auto)


  0.1 %


(0.0-3.0)


 


Basophils (%) (Auto)


  0.6 %


(0.0-2.0)


 


Sodium Level


  137 MMOL/L


(136-145)


 


Potassium Level


  3.9 MMOL/L


(3.5-5.1)


 


Chloride Level


  101 MMOL/L


()


 


Carbon Dioxide Level


  26 MMOL/L


(21-32)


 


Anion Gap


  11 mmol/L


(5-15)


 


Blood Urea Nitrogen


  6 mg/dL (7-18)


L


 


Creatinine


  0.7 MG/DL


(0.55-1.30)


 


Estimat Glomerular Filtration


Rate > 60 mL/min


(>60)


 


Glucose Level


  96 MG/DL


()


 


Calcium Level


  9.8 MG/DL


(8.5-10.1)








Height (Feet):  5


Height (Inches):  7.00


Weight (Pounds):  200


General Appearance:  WD/WN, no apparent distress, alert


Cardiovascular:  normal rate


Respiratory/Chest:  normal breath sounds, no respiratory distress


Abdominal Exam:  normal bowel sounds, non tender, soft


Extremities:  normal range of motion, non-tender











Maggi Montanez N.P. Jan 29, 2018 10:36

## 2018-01-29 NOTE — INTERNAL MED PROGRESS NOTE
Subjective


Date of Service:  Jan 29, 2018


Physician Name


Kylah Olson


Attending Physician


Dave Christine MD





Current Medications








 Medications


  (Trade)  Dose


 Ordered  Sig/Indira


 Route


 PRN Reason  Start Time


 Stop Time Status Last Admin


Dose Admin


 


 Acetaminophen


  (Tylenol)  650 mg  Q4H  PRN


 ORAL


 fever  1/19/18 05:30


 2/18/18 05:29  1/28/18 23:55


 


 


 Al Hydroxide/Mg


 Hydroxide


  (Mylanta II)  30 ml  Q6H  PRN


 ORAL


 dyspepsia  1/19/18 05:30


 2/18/18 05:29   


 


 


 Cefazolin Sodium  50 ml @ 


 100 mls/hr  Q6HR


 IV


   1/26/18 13:00


 2/2/18 12:59  1/29/18 11:39


 


 


 Chlorhexidine


 Gluconate


  (Payal-Hex 2%)  1 applic  DAILY@2000


 TOPIC


   1/26/18 20:00


 2/25/18 19:59  1/28/18 20:15


 


 


 Daptomycin 600 mg/


 Sodium Chloride  55 ml @ 


 100 mls/hr  Q24H


 IV


   1/27/18 15:00


 2/3/18 14:59  1/28/18 15:53


 


 


 Dextrose


  (Dextrose 50%)    STAT  PRN


 IV


 Hypoglycemia  1/19/18 05:30


 2/18/18 05:29   


 


 


 Diphenhydramine


 HCl


  (Benadryl)  25 mg  Q6H  PRN


 ORAL


 Itching/Pruritis  1/19/18 05:30


 2/18/18 05:29   


 


 


 Folic Acid


  (Folate)  1 mg  DAILY


 ORAL


   1/22/18 09:00


 2/21/18 08:59  1/29/18 09:00


 


 


 Miconazole Nitrate


  (Monistat)  1 applic  BEDTIME


 VAGIN


   1/29/18 21:00


 2/5/18 20:59   


 


 


 Morphine Sulfate


  (Morphine


 Sulfate)  4 mg  Q4H  PRN


 IVP


 Severe Pain (Pain Scale 7-10)  1/27/18 21:45


 2/3/18 21:44  1/29/18 08:13


 


 


 Nitroglycerin


  (Ntg)  0.4 mg  Q5M X 3 DOSES PRN


 SL


 Prn Chest Pain  1/19/18 05:30


 2/18/18 05:29   


 


 


 Ondansetron HCl


  (Zofran)  4 mg  Q6H  PRN


 IVP


 Nausea & Vomiting  1/19/18 05:30


 2/18/18 05:29  1/27/18 06:33


 


 


 Pantoprazole


  (Protonix)  40 mg  DAILY


 IV


   1/19/18 09:00


 2/18/18 08:59  1/29/18 09:00


 


 


 Polyethylene


 Glycol


  (Miralax)  17 gm  HSPRN  PRN


 ORAL


 Constipation  1/19/18 05:30


 2/18/18 05:29  1/25/18 06:16


 


 


 Promethazine HCl


  (Phenergan)  25 mg  Q8H  PRN


 ORAL


 Nausea & Vomiting  1/19/18 10:45


 2/18/18 10:44   


 








Allergies:  


Coded Allergies:  


     No Known Allergies (Unverified , 10/12/17)


ROS Limited/Unobtainable:  No


Constitutional:  Reports: fever


HEENT:  Reports: no symptoms


Cardiovascular:  Reports: no symptoms


Respiratory:  Reports: no symptoms


Gastrointestinal/Abdominal:  Reports: no symptoms


Genitourinary:  Reports: no symptoms


Neurologic/Psychiatric:  Reports: no symptoms


Subjective


31 YO F admitted with nausea and vomiting.  Now sepsis-await Transesophageal 

echocardiogram.  S/P Port A Cath removal 1/22/17.  S/P PICC line.  Low grade 

fever overnight.  Cover for Int Med-Dr Christine





Objective





Last Vital Signs








  Date Time  Temp Pulse Resp B/P (MAP) Pulse Ox O2 Delivery O2 Flow Rate FiO2


 


1/29/18 08:00 97.9 109 18 114/69 99 Room Air  











Laboratory Tests








Test


  1/29/18


05:50


 


White Blood Count


  10.8 K/UL


(4.8-10.8)


 


Red Blood Count


  3.30 M/UL


(4.20-5.40)  L


 


Hemoglobin


  9.6 G/DL


(12.0-16.0)  L


 


Hematocrit


  30.0 %


(37.0-47.0)  L


 


Mean Corpuscular Volume 91 FL (80-99)  


 


Mean Corpuscular Hemoglobin


  29.2 PG


(27.0-31.0)


 


Mean Corpuscular Hemoglobin


Concent 32.1 G/DL


(32.0-36.0)


 


Red Cell Distribution Width


  16.0 %


(11.6-14.8)  H


 


Platelet Count


  327 K/UL


(150-450)


 


Mean Platelet Volume


  7.0 FL


(6.5-10.1)


 


Neutrophils (%) (Auto)


  69.4 %


(45.0-75.0)


 


Lymphocytes (%) (Auto)


  18.6 %


(20.0-45.0)  L


 


Monocytes (%) (Auto)


  11.2 %


(1.0-10.0)  H


 


Eosinophils (%) (Auto)


  0.1 %


(0.0-3.0)


 


Basophils (%) (Auto)


  0.6 %


(0.0-2.0)


 


Sodium Level


  137 MMOL/L


(136-145)


 


Potassium Level


  3.9 MMOL/L


(3.5-5.1)


 


Chloride Level


  101 MMOL/L


()


 


Carbon Dioxide Level


  26 MMOL/L


(21-32)


 


Anion Gap


  11 mmol/L


(5-15)


 


Blood Urea Nitrogen


  6 mg/dL (7-18)


L


 


Creatinine


  0.7 MG/DL


(0.55-1.30)


 


Estimat Glomerular Filtration


Rate > 60 mL/min


(>60)


 


Glucose Level


  96 MG/DL


()


 


Calcium Level


  9.8 MG/DL


(8.5-10.1)











Microbiology








 Date/Time


Source Procedure


Growth Status


 


 


 1/27/18 07:22


Blood Blood Culture - Preliminary


NO GROWTH AFTER 24 HOURS Resulted


 


 1/27/18 07:15


Blood Blood Culture - Preliminary


NO GROWTH AFTER 24 HOURS Resulted


 


 1/26/18 13:00


Blood Blood Culture - Preliminary


NO GROWTH AFTER 48 HOURS Resulted


 


 1/26/18 12:40


Blood Blood Culture - Preliminary


NO GROWTH AFTER 48 HOURS Resulted

















Intake and Output  


 


 1/28/18 1/29/18





 19:00 07:00


 


Intake Total 750 ml 150 ml


 


Balance 750 ml 150 ml


 


  


 


Intake Oral 750 ml 


 


IV Total  150 ml


 


# Voids 2 3








Objective


General Appearance:  WD/WN, alert, moderate distress


EENT:  PERRL/EOMI, normal ENT inspection


Neck:  non-tender, normal alignment, supple, normal inspection


Cardiovascular:  normal peripheral pulses, normal rate, regular rhythm, no 

gallop/murmur, no JVD


Respiratory/Chest:  chest wall non-tender, lungs clear, normal breath sounds, 

no respiratory distress, no accessory muscle use


Abdomen:  normal bowel sounds, non tender, soft, no organomegaly, no mass


Extremities:  normal range of motion, non-tender


Neurologic:  CNs II-XII grossly normal, no motor/sensory deficits


Skin:  normal pigmentation, warm/dry





Assessment/Plan


Problem List:  


(1) Nausea & vomiting


Assessment & Plan:  continue zofran





(2) Leukocytosis


Assessment & Plan:  Due to sepsis.  Antibiotic per ID





(3) Bloodstream infection due to port-a-cath


Assessment & Plan:  S/P removal port A Cath on 1/22/18.  See Surgery note.  

Daptomycin and Ancef day #10/42 per ID.  S/P PICC for 42 day antibiotic therapy





(4) Sepsis


Assessment & Plan:  Staph aureus.  Await transesophageal echo to R/O 

endocarditis.  Continue daptomycin and ancef Day # 10/42 per ID





(5) UTI (urinary tract infection)


(6) Breast CA


Assessment & Plan:  Currently on chemotherapy





(7) Fever


Assessment & Plan:  see ID note.





Status:  not improved


Assessment/Plan


Discharge Planning:  home with home health for IV antibiotics











KYLAH OLSON Jan 29, 2018 12:03

## 2018-01-30 VITALS — DIASTOLIC BLOOD PRESSURE: 71 MMHG | SYSTOLIC BLOOD PRESSURE: 119 MMHG

## 2018-01-30 VITALS — DIASTOLIC BLOOD PRESSURE: 94 MMHG | SYSTOLIC BLOOD PRESSURE: 143 MMHG

## 2018-01-30 VITALS — SYSTOLIC BLOOD PRESSURE: 110 MMHG | DIASTOLIC BLOOD PRESSURE: 72 MMHG

## 2018-01-30 VITALS — SYSTOLIC BLOOD PRESSURE: 112 MMHG | DIASTOLIC BLOOD PRESSURE: 73 MMHG

## 2018-01-30 VITALS — DIASTOLIC BLOOD PRESSURE: 67 MMHG | SYSTOLIC BLOOD PRESSURE: 130 MMHG

## 2018-01-30 VITALS — DIASTOLIC BLOOD PRESSURE: 72 MMHG | SYSTOLIC BLOOD PRESSURE: 118 MMHG

## 2018-01-30 VITALS — SYSTOLIC BLOOD PRESSURE: 126 MMHG | DIASTOLIC BLOOD PRESSURE: 88 MMHG

## 2018-01-30 LAB
ADD MANUAL DIFF: NO
ANION GAP SERPL CALC-SCNC: 9 MMOL/L (ref 5–15)
BASOPHILS NFR BLD AUTO: 0.7 % (ref 0–2)
BUN SERPL-MCNC: 7 MG/DL (ref 7–18)
CALCIUM SERPL-MCNC: 9.6 MG/DL (ref 8.5–10.1)
CHLORIDE SERPL-SCNC: 100 MMOL/L (ref 98–107)
CO2 SERPL-SCNC: 28 MMOL/L (ref 21–32)
CREAT SERPL-MCNC: 0.8 MG/DL (ref 0.55–1.3)
EOSINOPHIL NFR BLD AUTO: 0.2 % (ref 0–3)
ERYTHROCYTE [DISTWIDTH] IN BLOOD BY AUTOMATED COUNT: 16.2 % (ref 11.6–14.8)
HCT VFR BLD CALC: 29.7 % (ref 37–47)
HGB BLD-MCNC: 9.6 G/DL (ref 12–16)
LYMPHOCYTES NFR BLD AUTO: 19.7 % (ref 20–45)
MCV RBC AUTO: 91 FL (ref 80–99)
MONOCYTES NFR BLD AUTO: 7.6 % (ref 1–10)
NEUTROPHILS NFR BLD AUTO: 71.9 % (ref 45–75)
PLATELET # BLD: 351 K/UL (ref 150–450)
POTASSIUM SERPL-SCNC: 4 MMOL/L (ref 3.5–5.1)
RBC # BLD AUTO: 3.26 M/UL (ref 4.2–5.4)
SODIUM SERPL-SCNC: 137 MMOL/L (ref 136–145)
WBC # BLD AUTO: 12.2 K/UL (ref 4.8–10.8)

## 2018-01-30 RX ADMIN — MORPHINE SULFATE PRN MG: 4 INJECTION INTRAVENOUS at 16:51

## 2018-01-30 RX ADMIN — CEFAZOLIN SODIUM SCH MLS/HR: 1 SOLUTION INTRAVENOUS at 12:48

## 2018-01-30 RX ADMIN — CEFAZOLIN SODIUM SCH MLS/HR: 1 SOLUTION INTRAVENOUS at 23:49

## 2018-01-30 RX ADMIN — CHLORHEXIDINE GLUCONATE SCH APPLIC: 213 SOLUTION TOPICAL at 20:00

## 2018-01-30 RX ADMIN — MORPHINE SULFATE PRN MG: 4 INJECTION INTRAVENOUS at 04:10

## 2018-01-30 RX ADMIN — PANTOPRAZOLE SODIUM SCH MG: 40 INJECTION, POWDER, FOR SOLUTION INTRAVENOUS at 09:31

## 2018-01-30 RX ADMIN — MICONAZOLE NITRATE SCH APPLIC: 20 CREAM VAGINAL at 20:19

## 2018-01-30 RX ADMIN — MORPHINE SULFATE PRN MG: 4 INJECTION INTRAVENOUS at 22:20

## 2018-01-30 RX ADMIN — MORPHINE SULFATE PRN MG: 4 INJECTION INTRAVENOUS at 09:31

## 2018-01-30 RX ADMIN — CEFAZOLIN SODIUM SCH MLS/HR: 1 SOLUTION INTRAVENOUS at 06:05

## 2018-01-30 RX ADMIN — CEFAZOLIN SODIUM SCH MLS/HR: 1 SOLUTION INTRAVENOUS at 18:17

## 2018-01-30 NOTE — GENERAL PROGRESS NOTE
Assessment/Plan


Status:  stable


Assessment/Plan


#. Anemia secondary to iron deficiency.  


--> Continue the patient on IV iron


--> Monitor levels.


--> Blood transfusion not required unless symptomatic or hgb <7. Has been >9


#. Pancytopenia likely secondary to recent chemotherapy.  In addition,


the patient with infection.  


--> The patient remains stable 


--> Levels are improved. wbc and platelets WNL. Hemoglobin stable >9


--> Monitor


#. Breast cancer, status post chemotherapy with nausea, vomiting, and allergy 

reaction.


--> Cont. treatment as outpatient. 


--> Monitor closely. 


#. Septic thrombophlebitis.  


--> The patient currently on broad-spectrum antibiotics.  


--> Continue to closely monitor.


#. Sepsis with bacteremia, has been seen by ID Service.


--> On vancomycin


#. Nausea and vomiting, likely secondary to chemotherapy reaction.  


--> We will continue to closely follow.





#. Dehydration.  


--> Continue fluids as needed.





Subjective


Date patient seen:  Jan 30, 2018


Constitutional:  Denies: no symptoms, chills, diaphoresis, fever, malaise, 

weakness, other


HEENT:  Denies: no symptoms, eye pain, blurred vision, tearing, double vision, 

ear pain, ear discharge, nose pain, nose congestion, throat pain, throat 

swelling, mouth pain, mouth swelling, other


Cardiovascular:  Denies: no symptoms, chest pain, edema, irregular heart rate, 

lightheadedness, palpitations, syncope, other


Respiratory:  Denies: no symptoms, cough, orthopnea, shortness of breath, SOB 

with excertion, SOB at rest, sputum, stridor, wheezing, other


Gastrointestinal/Abdominal:  Denies: no symptoms, abdomen distended, abdominal 

pain, black stools, tarry stools, blood in stool, constipated, diarrhea, 

difficulty swallowing, nausea, poor appetite, poor fluid intake, rectal bleeding

, vomiting, other


Allergies:  


Coded Allergies:  


     No Known Allergies (Unverified , 10/12/17)


Subjective


On abx IV. No major events overnight. Some tachycardia.





Objective





Last 24 Hour Vital Signs








  Date Time  Temp Pulse Resp B/P (MAP) Pulse Ox O2 Delivery O2 Flow Rate FiO2


 


1/30/18 19:34 97.9 94 20 118/72 100 Room Air  


 


1/30/18 16:00 97.5 104 20 126/88 97   


 


1/30/18 13:54 98.1       


 


1/30/18 12:00 99.0 105 18 112/73 96 Room Air  


 


1/30/18 08:00 98.1 110 18 130/67 100 Room Air  


 


1/30/18 04:40 98.8       


 


1/30/18 04:00 98.4 108 18 110/72 100   


 


1/30/18 00:00      Room Air  


 


1/30/18 00:00 98.8 109 18 119/71 96   

















Intake and Output  


 


 1/29/18 1/30/18





 19:00 07:00


 


Intake Total 905 ml 600 ml


 


Balance 905 ml 600 ml


 


  


 


Intake Oral 750 ml 500 ml


 


IV Total 155 ml 100 ml


 


# Voids 2 3








Laboratory Tests


1/30/18 04:50: 


White Blood Count 12.2H, Red Blood Count 3.26L, Hemoglobin 9.6L, Hematocrit 

29.7L, Mean Corpuscular Volume 91, Mean Corpuscular Hemoglobin 29.3, Mean 

Corpuscular Hemoglobin Concent 32.2, Red Cell Distribution Width 16.2H, 

Platelet Count 351, Mean Platelet Volume 6.8, Neutrophils (%) (Auto) 71.9, 

Lymphocytes (%) (Auto) 19.7L, Monocytes (%) (Auto) 7.6, Eosinophils (%) (Auto) 

0.2, Basophils (%) (Auto) 0.7, Sodium Level 137, Potassium Level 4.0, Chloride 

Level 100, Carbon Dioxide Level 28, Anion Gap 9, Blood Urea Nitrogen 7, 

Creatinine 0.8, Estimat Glomerular Filtration Rate > 60, Glucose Level 99, 

Calcium Level 9.6


Height (Feet):  5


Height (Inches):  7.00


Weight (Pounds):  200


General Appearance:  no apparent distress


Cardiovascular:  tachycardia


Respiratory/Chest:  decreased breath sounds


Abdomen:  non tender, soft











Wagner West Jan 30, 2018 22:33

## 2018-01-30 NOTE — PULMONOLOGY PROGRESS NOTE
Assessment/Plan


Problems:  


(1) Bacteremia


Assessment & Plan:  persistent





(2) Septic thrombophlebitis


(3) Sepsis


(4) Chemotherapy induced nausea and vomiting


(5) Breast CA


(6) Bloodstream infection due to port-a-cath


Assessment/Plan


improving


on Cefazoline now


check cultures


all notes reviewed


dc planning in progress/


iv abx at home





Subjective


ROS Limited/Unobtainable:  No


Allergies:  


Coded Allergies:  


     No Known Allergies (Unverified , 10/12/17)





Objective





Last 24 Hour Vital Signs








  Date Time  Temp Pulse Resp B/P (MAP) Pulse Ox O2 Delivery O2 Flow Rate FiO2


 


1/30/18 16:00 97.5 104 20 126/88 97   


 


1/30/18 13:54 98.1       


 


1/30/18 12:00 99.0 105 18 112/73 96 Room Air  


 


1/30/18 08:00 98.1 110 18 130/67 100 Room Air  


 


1/30/18 04:40 98.8       


 


1/30/18 04:00 98.4 108 18 110/72 100   


 


1/30/18 00:00      Room Air  


 


1/30/18 00:00 98.8 109 18 119/71 96   


 


1/29/18 20:00      Room Air  


 


1/29/18 20:00 99.3 103 19 102/70 95   

















Intake and Output  


 


 1/29/18 1/30/18





 19:00 07:00


 


Intake Total 905 ml 600 ml


 


Balance 905 ml 600 ml


 


  


 


Intake Oral 750 ml 500 ml


 


IV Total 155 ml 100 ml


 


# Voids 2 3








Objective


General Appearance:  no acute distress, other - A/A/O x 3 morbidly obese AA 

male in NAD


HEENT:  normocephalic, atraumatic, anicteric, mucous membranes moist


Respiratory/Chest:  lungs clear -with moderate air exchange  no respiratory 

distress, no accessory muscle use, tenderness around Left upper chest area


Cardiovascular:  normal rate 


Abdomen:  normal bowel sounds, soft, non tender , obese


Extremities:  pedal pulses normal, ot


Neurologic/Psychiatric:  no motor/sensory deficits, alert, oriented x 3, 

responsive


Musculoskeletal:  normal muscle bulk


Laboratory Tests


1/30/18 04:50: 


White Blood Count 12.2H, Red Blood Count 3.26L, Hemoglobin 9.6L, Hematocrit 

29.7L, Mean Corpuscular Volume 91, Mean Corpuscular Hemoglobin 29.3, Mean 

Corpuscular Hemoglobin Concent 32.2, Red Cell Distribution Width 16.2H, 

Platelet Count 351, Mean Platelet Volume 6.8, Neutrophils (%) (Auto) 71.9, 

Lymphocytes (%) (Auto) 19.7L, Monocytes (%) (Auto) 7.6, Eosinophils (%) (Auto) 

0.2, Basophils (%) (Auto) 0.7, Sodium Level 137, Potassium Level 4.0, Chloride 

Level 100, Carbon Dioxide Level 28, Anion Gap 9, Blood Urea Nitrogen 7, 

Creatinine 0.8, Estimat Glomerular Filtration Rate > 60, Glucose Level 99, 

Calcium Level 9.6





Current Medications








 Medications


  (Trade)  Dose


 Ordered  Sig/Indira


 Route


 PRN Reason  Start Time


 Stop Time Status Last Admin


Dose Admin


 


 Acetaminophen


  (Tylenol)  650 mg  Q4H  PRN


 ORAL


 fever  1/19/18 05:30


 2/18/18 05:29  1/30/18 12:55


 


 


 Al Hydroxide/Mg


 Hydroxide


  (Mylanta II)  30 ml  Q6H  PRN


 ORAL


 dyspepsia  1/19/18 05:30


 2/18/18 05:29   


 


 


 Cefazolin Sodium  50 ml @ 


 100 mls/hr  Q6HR


 IV


   1/26/18 13:00


 2/2/18 12:59  1/30/18 12:48


 


 


 Chlorhexidine


 Gluconate


  (Payal-Hex 2%)  1 applic  DAILY@2000


 TOPIC


   1/26/18 20:00


 2/25/18 19:59  1/29/18 20:14


 


 


 Dextrose


  (Dextrose 50%)    STAT  PRN


 IV


 Hypoglycemia  1/19/18 05:30


 2/18/18 05:29   


 


 


 Diphenhydramine


 HCl


  (Benadryl)  25 mg  Q6H  PRN


 ORAL


 Itching/Pruritis  1/19/18 05:30


 2/18/18 05:29   


 


 


 Folic Acid


  (Folate)  1 mg  DAILY


 ORAL


   1/22/18 09:00


 2/21/18 08:59  1/30/18 09:30


 


 


 Miconazole Nitrate


  (Monistat)  1 applic  BEDTIME


 VAGIN


   1/29/18 21:00


 2/5/18 20:59  1/29/18 20:16


 


 


 Morphine Sulfate


  (Morphine


 Sulfate)  4 mg  Q4H  PRN


 IVP


 Severe Pain (Pain Scale 7-10)  1/27/18 21:45


 2/3/18 21:44  1/30/18 16:51


 


 


 Nitroglycerin


  (Ntg)  0.4 mg  Q5M X 3 DOSES PRN


 SL


 Prn Chest Pain  1/19/18 05:30


 2/18/18 05:29   


 


 


 Ondansetron HCl


  (Zofran)  4 mg  Q6H  PRN


 IVP


 Nausea & Vomiting  1/19/18 05:30


 2/18/18 05:29  1/27/18 06:33


 


 


 Pantoprazole


  (Protonix)  40 mg  ACBREAKFAST


 ORAL


   1/31/18 06:30


 3/2/18 06:29   


 


 


 Polyethylene


 Glycol


  (Miralax)  17 gm  HSPRN  PRN


 ORAL


 Constipation  1/19/18 05:30


 2/18/18 05:29  1/25/18 06:16


 


 


 Promethazine HCl


  (Phenergan)  25 mg  Q8H  PRN


 ORAL


 Nausea & Vomiting  1/19/18 10:45


 2/18/18 10:44   


 

















JOE PRITCHETT Jan 30, 2018 17:59

## 2018-01-30 NOTE — GI PROGRESS NOTE
Assessment/Plan


Problems:  


(1) Bloodstream infection due to port-a-cath


ICD Codes:  T80.211A - Bloodstream infection due to central venous catheter, 

initial encounter


SNOMED:  244172623


(2) Nausea & vomiting


ICD Codes:  R11.2 - Nausea with vomiting, unspecified


SNOMED:  35685173


(3) Breast CA


ICD Codes:  C50.919 - Malignant neoplasm of unspecified site of unspecified 

female breast


SNOMED:  422932475


(4) Abnormal LFTs


ICD Codes:  R94.5 - Abnormal results of liver function studies


SNOMED:  328667960


(5) Chemotherapy induced nausea and vomiting


ICD Codes:  R11.2 - Nausea with vomiting, unspecified; T45.1X5A - Adverse 

effect of antineoplastic and immunosuppressive drugs, initial encounter


SNOMED:  672067669


(6) Dehydration


ICD Codes:  E86.0 - Dehydration


SNOMED:  07755019


(7) Electrolyte imbalance


ICD Codes:  E87.8 - Other disorders of electrolyte and fluid balance, not 

elsewhere classified


SNOMED:  052414508


Status:  stable


Status Narrative


Discussed with Dr. Marie.


Assessment/Plan


CT reviewed, see full report >>  suggests thrombophlebitis.


iron deficiency >> venofer





okay for DC per GI standpoint


adv to regular diet, tolerating


PICC placed


abx


pain control


Phenergan and zofran prn


fu labs





Subjective


Subjective


N/V/D resolved





Objective





Last 24 Hour Vital Signs








  Date Time  Temp Pulse Resp B/P (MAP) Pulse Ox O2 Delivery O2 Flow Rate FiO2


 


1/30/18 08:00 98.1 110 18 130/67 100 Room Air  


 


1/30/18 04:40 98.8       


 


1/30/18 04:00 98.4 108 18 110/72 100   


 


1/30/18 00:00      Room Air  


 


1/30/18 00:00 98.8 109 18 119/71 96   


 


1/29/18 20:00      Room Air  


 


1/29/18 20:00 99.3 103 19 102/70 95   


 


1/29/18 17:25 98.6       


 


1/29/18 16:00 99.1 104 18 113/78 97 Room Air  


 


1/29/18 12:05 98.1 100 18 120/81 99 Room Air  

















Intake and Output  


 


 1/29/18 1/30/18





 19:00 07:00


 


Intake Total 905 ml 600 ml


 


Balance 905 ml 600 ml


 


  


 


Intake Oral 750 ml 500 ml


 


IV Total 155 ml 100 ml


 


# Voids 2 3











Laboratory Tests








Test


  1/30/18


04:50


 


White Blood Count


  12.2 K/UL


(4.8-10.8)  H


 


Red Blood Count


  3.26 M/UL


(4.20-5.40)  L


 


Hemoglobin


  9.6 G/DL


(12.0-16.0)  L


 


Hematocrit


  29.7 %


(37.0-47.0)  L


 


Mean Corpuscular Volume 91 FL (80-99)  


 


Mean Corpuscular Hemoglobin


  29.3 PG


(27.0-31.0)


 


Mean Corpuscular Hemoglobin


Concent 32.2 G/DL


(32.0-36.0)


 


Red Cell Distribution Width


  16.2 %


(11.6-14.8)  H


 


Platelet Count


  351 K/UL


(150-450)


 


Mean Platelet Volume


  6.8 FL


(6.5-10.1)


 


Neutrophils (%) (Auto)


  71.9 %


(45.0-75.0)


 


Lymphocytes (%) (Auto)


  19.7 %


(20.0-45.0)  L


 


Monocytes (%) (Auto)


  7.6 %


(1.0-10.0)


 


Eosinophils (%) (Auto)


  0.2 %


(0.0-3.0)


 


Basophils (%) (Auto)


  0.7 %


(0.0-2.0)


 


Sodium Level


  137 MMOL/L


(136-145)


 


Potassium Level


  4.0 MMOL/L


(3.5-5.1)


 


Chloride Level


  100 MMOL/L


()


 


Carbon Dioxide Level


  28 MMOL/L


(21-32)


 


Anion Gap


  9 mmol/L


(5-15)


 


Blood Urea Nitrogen


  7 mg/dL (7-18)


 


 


Creatinine


  0.8 MG/DL


(0.55-1.30)


 


Estimat Glomerular Filtration


Rate > 60 mL/min


(>60)


 


Glucose Level


  99 MG/DL


()


 


Calcium Level


  9.6 MG/DL


(8.5-10.1)








Height (Feet):  5


Height (Inches):  7.00


Weight (Pounds):  200


General Appearance:  WD/WN, no apparent distress, alert


Cardiovascular:  normal rate


Respiratory/Chest:  normal breath sounds, no respiratory distress


Abdominal Exam:  normal bowel sounds, non tender, soft


Extremities:  normal range of motion, non-tender











Maggi Montanez N.P. Jan 30, 2018 10:40

## 2018-01-30 NOTE — INFECTIOUS DISEASES PROG NOTE
Assessment/Plan


Assessment/Plan








Sepsis , SP 


Bacteremia Staph A /  line infection, / persistent due to septic 

thrombophlebitis  , ( all surg, Wnd  and blood cx are : MSSA , repeat Culture 

on  previously reported as MRSA, now corrected to MSSA) 


  2d Echo no vegetation


  -s/p port removal ; OR wnd Cx :+4 MSSA 


    -OR findings:The port had a capsule around it and a fluid collection within 

the capsule. Fluid collection was evacuated and fibrinous purulent exudate was 

taken.


   wnd Cx ( over the port) : MSSA


  CT: Implanted infusion port in the left chest. No definite fluid collection 

around the port. However, there appears to be occlusion of the left jugular 

vein as well as the left brachiocephalic vein with stranding around the 

brachiocephalic vein. This suggests thrombophlebitis.


  - BCx  MSSA, , , ; ,  NTD





Fever, improving


    -u/a wbc 5/10; Ucx mixed gram positive growth


    -Influenza neg


    -CXR no focal consolidation





Nausea, vomiting/diarrhea (probably due to chemo vs related to sepsis)


   -Abd US: Hepatosplenomegaly.Otherwise, unremarkable abdominal sonogram.





Leukocytosis, mild recurrent


Elevated LFTs, resolved








-s/p PICC line placement 





- Right breast mastectomy, status post lumpectomy.


-. History of  and tubal ligation.


-. Status post Port-A-Cath placement.








PLAN:





continue  IV  Ancef  ( AB Rx d# 11 ) will treat for 6 weeks from 1st negative 

BCx on  for septic thrombophlebitis (end date 3/8/18) 


  -weekly CBC, CMP





    - SP Daptomycin #3


    - SP Vancomycin d # 7


    -  SP  Zosyn # 4 


    - SP Ceftriaxone x1





-Hold chemo until completion of  abx treatment.


f/u final cx ( Bl ) 


Monitor CBC and CMP 


if persistent bacteremia :   RICHARD is optional to Ro jeffery-valve abscess





Subjective


Allergies:  


Coded Allergies:  


     No Known Allergies (Unverified , 10/12/17)


Subjective


afebrile in 36hrs


mild leukocytosis to 12


repeat Bcx , 27 NTD





Objective


Vital Signs





Last 24 Hour Vital Signs








  Date Time  Temp Pulse Resp B/P (MAP) Pulse Ox O2 Delivery O2 Flow Rate FiO2


 


18 08:00 98.1 110 18 130/67 100 Room Air  


 


18 04:40 98.8       


 


18 04:00 98.4 108 18 110/72 100   


 


18 00:00      Room Air  


 


18 00:00 98.8 109 18 119/71 96   


 


18 20:00      Room Air  


 


18 20:00 99.3 103 19 102/70 95   


 


18 17:25 98.6       


 


18 16:00 99.1 104 18 113/78 97 Room Air  


 


18 12:05 98.1 100 18 120/81 99 Room Air  








Height (Feet):  5


Height (Inches):  7.00


Weight (Pounds):  200


Objective


HEENT:  No pale conjunctivae.  No icterus.


NECK:  No lymphadenopathy.


CHEST:  Clear.


HEART:  S1 and S2.


ABDOMEN:  Soft and nontender.


EXTREMITIES:  No cyanosis at this time.


NEUROLOGIC:  Awake and alert.





Laboratory Tests








Test


  18


04:50


 


White Blood Count


  12.2 K/UL


(4.8-10.8)  H


 


Red Blood Count


  3.26 M/UL


(4.20-5.40)  L


 


Hemoglobin


  9.6 G/DL


(12.0-16.0)  L


 


Hematocrit


  29.7 %


(37.0-47.0)  L


 


Mean Corpuscular Volume 91 FL (80-99)  


 


Mean Corpuscular Hemoglobin


  29.3 PG


(27.0-31.0)


 


Mean Corpuscular Hemoglobin


Concent 32.2 G/DL


(32.0-36.0)


 


Red Cell Distribution Width


  16.2 %


(11.6-14.8)  H


 


Platelet Count


  351 K/UL


(150-450)


 


Mean Platelet Volume


  6.8 FL


(6.5-10.1)


 


Neutrophils (%) (Auto)


  71.9 %


(45.0-75.0)


 


Lymphocytes (%) (Auto)


  19.7 %


(20.0-45.0)  L


 


Monocytes (%) (Auto)


  7.6 %


(1.0-10.0)


 


Eosinophils (%) (Auto)


  0.2 %


(0.0-3.0)


 


Basophils (%) (Auto)


  0.7 %


(0.0-2.0)


 


Sodium Level


  137 MMOL/L


(136-145)


 


Potassium Level


  4.0 MMOL/L


(3.5-5.1)


 


Chloride Level


  100 MMOL/L


()


 


Carbon Dioxide Level


  28 MMOL/L


(21-32)


 


Anion Gap


  9 mmol/L


(5-15)


 


Blood Urea Nitrogen


  7 mg/dL (7-18)


 


 


Creatinine


  0.8 MG/DL


(0.55-1.30)


 


Estimat Glomerular Filtration


Rate > 60 mL/min


(>60)


 


Glucose Level


  99 MG/DL


()


 


Calcium Level


  9.6 MG/DL


(8.5-10.1)











Current Medications








 Medications


  (Trade)  Dose


 Ordered  Sig/Indira


 Route


 PRN Reason  Start Time


 Stop Time Status Last Admin


Dose Admin


 


 Acetaminophen


  (Tylenol)  650 mg  Q4H  PRN


 ORAL


 fever  18 05:30


 18 05:29  18 16:26


 


 


 Al Hydroxide/Mg


 Hydroxide


  (Mylanta II)  30 ml  Q6H  PRN


 ORAL


 dyspepsia  18 05:30


 18 05:29   


 


 


 Cefazolin Sodium  50 ml @ 


 100 mls/hr  Q6HR


 IV


   18 13:00


 18 12:59  18 06:05


 


 


 Chlorhexidine


 Gluconate


  (Payal-Hex 2%)  1 applic  DAILY@2000


 TOPIC


   18 20:00


 18 19:59  18 20:14


 


 


 Dextrose


  (Dextrose 50%)    STAT  PRN


 IV


 Hypoglycemia  18 05:30


 18 05:29   


 


 


 Diphenhydramine


 HCl


  (Benadryl)  25 mg  Q6H  PRN


 ORAL


 Itching/Pruritis  18 05:30


 18 05:29   


 


 


 Folic Acid


  (Folate)  1 mg  DAILY


 ORAL


   18 09:00


 18 08:59  18 09:30


 


 


 Miconazole Nitrate


  (Monistat)  1 applic  BEDTIME


 VAGIN


   18 21:00


 18 20:59  18 20:16


 


 


 Morphine Sulfate


  (Morphine


 Sulfate)  4 mg  Q4H  PRN


 IVP


 Severe Pain (Pain Scale 7-10)  18 21:45


 2/3/18 21:44  18 09:31


 


 


 Nitroglycerin


  (Ntg)  0.4 mg  Q5M X 3 DOSES PRN


 SL


 Prn Chest Pain  18 05:30


 18 05:29   


 


 


 Ondansetron HCl


  (Zofran)  4 mg  Q6H  PRN


 IVP


 Nausea & Vomiting  18 05:30


 18 05:29  18 06:33


 


 


 Pantoprazole


  (Protonix)  40 mg  DAILY


 IV


   18 09:00


 18 08:59  18 09:31


 


 


 Polyethylene


 Glycol


  (Miralax)  17 gm  HSPRN  PRN


 ORAL


 Constipation  18 05:30


 18 05:29  18 06:16


 


 


 Promethazine HCl


  (Phenergan)  25 mg  Q8H  PRN


 ORAL


 Nausea & Vomiting  18 10:45


 18 10:44   


 

















Karol Burton M.D. 2018 11:24

## 2018-01-30 NOTE — INTERNAL MED PROGRESS NOTE
Subjective


Date of Service:  Jan 30, 2018


Physician Name


Kylah Olson


Attending Physician


Dave Christine MD





Current Medications








 Medications


  (Trade)  Dose


 Ordered  Sig/Indira


 Route


 PRN Reason  Start Time


 Stop Time Status Last Admin


Dose Admin


 


 Acetaminophen


  (Tylenol)  650 mg  Q4H  PRN


 ORAL


 fever  1/19/18 05:30


 2/18/18 05:29  1/30/18 12:55


 


 


 Al Hydroxide/Mg


 Hydroxide


  (Mylanta II)  30 ml  Q6H  PRN


 ORAL


 dyspepsia  1/19/18 05:30


 2/18/18 05:29   


 


 


 Cefazolin Sodium  50 ml @ 


 100 mls/hr  Q6HR


 IV


   1/26/18 13:00


 2/2/18 12:59  1/30/18 12:48


 


 


 Chlorhexidine


 Gluconate


  (Payal-Hex 2%)  1 applic  DAILY@2000


 TOPIC


   1/26/18 20:00


 2/25/18 19:59  1/29/18 20:14


 


 


 Dextrose


  (Dextrose 50%)    STAT  PRN


 IV


 Hypoglycemia  1/19/18 05:30


 2/18/18 05:29   


 


 


 Diphenhydramine


 HCl


  (Benadryl)  25 mg  Q6H  PRN


 ORAL


 Itching/Pruritis  1/19/18 05:30


 2/18/18 05:29   


 


 


 Folic Acid


  (Folate)  1 mg  DAILY


 ORAL


   1/22/18 09:00


 2/21/18 08:59  1/30/18 09:30


 


 


 Miconazole Nitrate


  (Monistat)  1 applic  BEDTIME


 VAGIN


   1/29/18 21:00


 2/5/18 20:59  1/29/18 20:16


 


 


 Morphine Sulfate


  (Morphine


 Sulfate)  4 mg  Q4H  PRN


 IVP


 Severe Pain (Pain Scale 7-10)  1/27/18 21:45


 2/3/18 21:44  1/30/18 09:31


 


 


 Nitroglycerin


  (Ntg)  0.4 mg  Q5M X 3 DOSES PRN


 SL


 Prn Chest Pain  1/19/18 05:30


 2/18/18 05:29   


 


 


 Ondansetron HCl


  (Zofran)  4 mg  Q6H  PRN


 IVP


 Nausea & Vomiting  1/19/18 05:30


 2/18/18 05:29  1/27/18 06:33


 


 


 Pantoprazole


  (Protonix)  40 mg  ACBREAKFAST


 ORAL


   1/31/18 06:30


 3/2/18 06:29   


 


 


 Pantoprazole


  (Protonix)  40 mg  DAILY


 IV


   1/19/18 09:00


 2/18/18 08:59  1/30/18 09:31


 


 


 Polyethylene


 Glycol


  (Miralax)  17 gm  HSPRN  PRN


 ORAL


 Constipation  1/19/18 05:30


 2/18/18 05:29  1/25/18 06:16


 


 


 Promethazine HCl


  (Phenergan)  25 mg  Q8H  PRN


 ORAL


 Nausea & Vomiting  1/19/18 10:45


 2/18/18 10:44   


 








Allergies:  


Coded Allergies:  


     No Known Allergies (Unverified , 10/12/17)


ROS Limited/Unobtainable:  No


Constitutional:  Reports: no symptoms


HEENT:  Reports: no symptoms


Cardiovascular:  Reports: no symptoms


Respiratory:  Reports: no symptoms


Gastrointestinal/Abdominal:  Reports: no symptoms


Genitourinary:  Reports: no symptoms


Neurologic/Psychiatric:  Reports: no symptoms


Subjective


31 YO F admitted with nausea and vomiting.  Now sepsis-await Transesophageal 

echocardiogram.  S/P Port A Cath removal 1/22/17.  C/O pain at left PICC line 

site-await venous duplex doppler.  Low grade fever overnight.  Cover for Int Med

-Dr Christine





Objective





Last Vital Signs








  Date Time  Temp Pulse Resp B/P (MAP) Pulse Ox O2 Delivery O2 Flow Rate FiO2


 


1/30/18 13:54 98.1       


 


1/30/18 12:00  105 18 112/73 96 Room Air  











Laboratory Tests








Test


  1/30/18


04:50


 


White Blood Count


  12.2 K/UL


(4.8-10.8)  H


 


Red Blood Count


  3.26 M/UL


(4.20-5.40)  L


 


Hemoglobin


  9.6 G/DL


(12.0-16.0)  L


 


Hematocrit


  29.7 %


(37.0-47.0)  L


 


Mean Corpuscular Volume 91 FL (80-99)  


 


Mean Corpuscular Hemoglobin


  29.3 PG


(27.0-31.0)


 


Mean Corpuscular Hemoglobin


Concent 32.2 G/DL


(32.0-36.0)


 


Red Cell Distribution Width


  16.2 %


(11.6-14.8)  H


 


Platelet Count


  351 K/UL


(150-450)


 


Mean Platelet Volume


  6.8 FL


(6.5-10.1)


 


Neutrophils (%) (Auto)


  71.9 %


(45.0-75.0)


 


Lymphocytes (%) (Auto)


  19.7 %


(20.0-45.0)  L


 


Monocytes (%) (Auto)


  7.6 %


(1.0-10.0)


 


Eosinophils (%) (Auto)


  0.2 %


(0.0-3.0)


 


Basophils (%) (Auto)


  0.7 %


(0.0-2.0)


 


Sodium Level


  137 MMOL/L


(136-145)


 


Potassium Level


  4.0 MMOL/L


(3.5-5.1)


 


Chloride Level


  100 MMOL/L


()


 


Carbon Dioxide Level


  28 MMOL/L


(21-32)


 


Anion Gap


  9 mmol/L


(5-15)


 


Blood Urea Nitrogen


  7 mg/dL (7-18)


 


 


Creatinine


  0.8 MG/DL


(0.55-1.30)


 


Estimat Glomerular Filtration


Rate > 60 mL/min


(>60)


 


Glucose Level


  99 MG/DL


()


 


Calcium Level


  9.6 MG/DL


(8.5-10.1)

















Intake and Output  


 


 1/29/18 1/30/18





 19:00 07:00


 


Intake Total 905 ml 600 ml


 


Balance 905 ml 600 ml


 


  


 


Intake Oral 750 ml 500 ml


 


IV Total 155 ml 100 ml


 


# Voids 2 3








Objective


General Appearance:  WD/WN, alert, moderate distress


EENT:  PERRL/EOMI, normal ENT inspection


Neck:  non-tender, normal alignment, supple, normal inspection


Cardiovascular:  normal peripheral pulses, normal rate, regular rhythm, no 

gallop/murmur, no JVD


Respiratory/Chest:  chest wall non-tender, lungs clear, normal breath sounds, 

no respiratory distress, no accessory muscle use


Abdomen:  normal bowel sounds, non tender, soft, no organomegaly, no mass


Extremities:  normal range of motion, non-tender


Neurologic:  CNs II-XII grossly normal, no motor/sensory deficits


Skin:  normal pigmentation, warm/dry





Assessment/Plan


Problem List:  


(1) Nausea & vomiting


Assessment & Plan:  continue zofran





(2) Leukocytosis


Assessment & Plan:  Due to sepsis.  Antibiotic per ID





(3) Bloodstream infection due to port-a-cath


Assessment & Plan:  S/P removal port A Cath on 1/22/18.  See Surgery note.  

Daptomycin and Ancef day #11/42 per ID.  S/P PICC for 42 day antibiotic therapy 

(3/8/18)





(4) Sepsis


Assessment & Plan:  Staph aureus.  Await transesophageal echo to R/O 

endocarditis.  Continue daptomycin and ancef Day # 11/42 per ID (3/8/18)





(5) UTI (urinary tract infection)


(6) Breast CA


Assessment & Plan:  Currently on chemotherapy





(7) Fever


Assessment & Plan:  see ID note.





Status:  progressing


Assessment/Plan


Discharge Planning:  home with home health for IV antibiotics











KYLAH OLSON Jan 30, 2018 16:24

## 2018-01-31 VITALS — SYSTOLIC BLOOD PRESSURE: 112 MMHG | DIASTOLIC BLOOD PRESSURE: 74 MMHG

## 2018-01-31 VITALS — DIASTOLIC BLOOD PRESSURE: 70 MMHG | SYSTOLIC BLOOD PRESSURE: 108 MMHG

## 2018-01-31 VITALS — SYSTOLIC BLOOD PRESSURE: 111 MMHG | DIASTOLIC BLOOD PRESSURE: 72 MMHG

## 2018-01-31 VITALS — SYSTOLIC BLOOD PRESSURE: 134 MMHG | DIASTOLIC BLOOD PRESSURE: 77 MMHG

## 2018-01-31 LAB
ADD MANUAL DIFF: NO
ANION GAP SERPL CALC-SCNC: 11 MMOL/L (ref 5–15)
BASOPHILS NFR BLD AUTO: 0.4 % (ref 0–2)
BUN SERPL-MCNC: 9 MG/DL (ref 7–18)
CALCIUM SERPL-MCNC: 10.1 MG/DL (ref 8.5–10.1)
CHLORIDE SERPL-SCNC: 101 MMOL/L (ref 98–107)
CO2 SERPL-SCNC: 26 MMOL/L (ref 21–32)
CREAT SERPL-MCNC: 0.8 MG/DL (ref 0.55–1.3)
EOSINOPHIL NFR BLD AUTO: 0.4 % (ref 0–3)
ERYTHROCYTE [DISTWIDTH] IN BLOOD BY AUTOMATED COUNT: 16.2 % (ref 11.6–14.8)
HCT VFR BLD CALC: 29.2 % (ref 37–47)
HGB BLD-MCNC: 9.5 G/DL (ref 12–16)
LYMPHOCYTES NFR BLD AUTO: 16.8 % (ref 20–45)
MCV RBC AUTO: 91 FL (ref 80–99)
MONOCYTES NFR BLD AUTO: 7.3 % (ref 1–10)
NEUTROPHILS NFR BLD AUTO: 75.1 % (ref 45–75)
PLATELET # BLD: 347 K/UL (ref 150–450)
POTASSIUM SERPL-SCNC: 4.2 MMOL/L (ref 3.5–5.1)
RBC # BLD AUTO: 3.21 M/UL (ref 4.2–5.4)
SODIUM SERPL-SCNC: 138 MMOL/L (ref 136–145)
WBC # BLD AUTO: 11.2 K/UL (ref 4.8–10.8)

## 2018-01-31 RX ADMIN — MORPHINE SULFATE PRN MG: 4 INJECTION INTRAVENOUS at 13:51

## 2018-01-31 RX ADMIN — MORPHINE SULFATE PRN MG: 4 INJECTION INTRAVENOUS at 03:51

## 2018-01-31 RX ADMIN — MORPHINE SULFATE PRN MG: 4 INJECTION INTRAVENOUS at 21:36

## 2018-01-31 RX ADMIN — CEFAZOLIN SODIUM SCH MLS/HR: 1 SOLUTION INTRAVENOUS at 05:40

## 2018-01-31 RX ADMIN — MICONAZOLE NITRATE SCH APPLIC: 20 CREAM VAGINAL at 21:17

## 2018-01-31 RX ADMIN — MORPHINE SULFATE PRN MG: 4 INJECTION INTRAVENOUS at 08:21

## 2018-01-31 RX ADMIN — RIVAROXABAN SCH MG: 15 TABLET, FILM COATED ORAL at 14:44

## 2018-01-31 RX ADMIN — CHLORHEXIDINE GLUCONATE SCH APPLIC: 213 SOLUTION TOPICAL at 21:17

## 2018-01-31 RX ADMIN — SODIUM CHLORIDE SCH MLS/HR: 0.9 INJECTION INTRAVENOUS at 18:54

## 2018-01-31 NOTE — INFECTIOUS DISEASES PROG NOTE
Assessment/Plan


Assessment/Plan








Sepsis , SP 


Bacteremia Staph A /  line infection, / persistent due to septic 

thrombophlebitis  , ( all surg, Wnd  and blood cx are : MSSA , repeat Culture 

on  previously reported as MRSA, now corrected to MSSA) 


  2d Echo no vegetation


  -s/p port removal ; OR wnd Cx :+4 MSSA 


    -OR findings:The port had a capsule around it and a fluid collection within 

the capsule. Fluid collection was evacuated and fibrinous purulent exudate was 

taken.


   wnd Cx ( over the port) : MSSA


  CT: Implanted infusion port in the left chest. No definite fluid collection 

around the port. However, there appears to be occlusion of the left jugular 

vein as well as the left brachiocephalic vein with stranding around the 

brachiocephalic vein. This suggests thrombophlebitis.


  - BCx  MSSA, , , ; ,  NTD





Fever, improving


    -u/a wbc 5/10; Ucx mixed gram positive growth


    -Influenza neg


    -CXR no focal consolidation


.


Leukocytosis, mild recurrent; imrpoving





LUE PICC line associated DVT





Nausea, vomiting/diarrhea (probably due to chemo vs related to sepsis)


   -Abd US: Hepatosplenomegaly.Otherwise, unremarkable abdominal sonogram





Elevated LFTs, resolved








-s/p PICC line placement 





- Right breast mastectomy, status post lumpectomy.


-. History of  and tubal ligation.


-. Status post Port-A-Cath placement.








PLAN:





continue  IV  Ancef  ( AB Rx d# 12 ) will treat for 6 weeks from 1st negative 

BCx on  for septic thrombophlebitis (end date 3/8/18) 


  -weekly CBC, CMP





    - SP Daptomycin #3


    - SP Vancomycin d # 7


    -  SP  Zosyn # 4 


    - SP Ceftriaxone x1





-LUE DVT and PICC management per PCP and heme-onc





-Hold chemo until completion of  abx treatment.


f/u final cx ( Bl ) 


Monitor CBC and CMP 


if persistent bacteremia :   RICHARD is optional to Ro jeffery-valve abscess 








Subjective


Allergies:  


Coded Allergies:  


     No Known Allergies (Unverified , 10/12/17)


Subjective


afebrile in 36hrs


leukocytosis improving


repeat Bcx , 27 NTD





LUE +DVT where pICC line is





Objective


Vital Signs





Last 24 Hour Vital Signs








  Date Time  Temp Pulse Resp B/P (MAP) Pulse Ox O2 Delivery O2 Flow Rate FiO2


 


18 11:42 98.0 92 20 112/74 95   


 


18 08:14 98.6 104 20 112/74 95   


 


18 03:47 98.6 110 18 108/70 100   


 


18 23:57 97.5 98 20 143/94 96 Room Air  


 


18 19:34 97.9 94 20 118/72 100 Room Air  


 


18 16:00 97.5 104 20 126/88 97   


 


18 13:54 98.1       








Height (Feet):  5


Height (Inches):  7.00


Weight (Pounds):  200


Objective


HEENT:  No pale conjunctivae.  No icterus.


NECK:  No lymphadenopathy.


CHEST:  Clear.


HEART:  S1 and S2.


ABDOMEN:  Soft and nontender.


EXTREMITIES:  No cyanosis at this time. L arm with PICC line, +swelling and TTP

, no signs of infection


NEUROLOGIC:  Awake and alert.





Laboratory Tests








Test


  18


05:40


 


White Blood Count


  11.2 K/UL


(4.8-10.8)  H


 


Red Blood Count


  3.21 M/UL


(4.20-5.40)  L


 


Hemoglobin


  9.5 G/DL


(12.0-16.0)  L


 


Hematocrit


  29.2 %


(37.0-47.0)  L


 


Mean Corpuscular Volume 91 FL (80-99)  


 


Mean Corpuscular Hemoglobin


  29.5 PG


(27.0-31.0)


 


Mean Corpuscular Hemoglobin


Concent 32.4 G/DL


(32.0-36.0)


 


Red Cell Distribution Width


  16.2 %


(11.6-14.8)  H


 


Platelet Count


  347 K/UL


(150-450)


 


Mean Platelet Volume


  6.6 FL


(6.5-10.1)


 


Neutrophils (%) (Auto)


  75.1 %


(45.0-75.0)  H


 


Lymphocytes (%) (Auto)


  16.8 %


(20.0-45.0)  L


 


Monocytes (%) (Auto)


  7.3 %


(1.0-10.0)


 


Eosinophils (%) (Auto)


  0.4 %


(0.0-3.0)


 


Basophils (%) (Auto)


  0.4 %


(0.0-2.0)


 


Sodium Level


  138 MMOL/L


(136-145)


 


Potassium Level


  4.2 MMOL/L


(3.5-5.1)


 


Chloride Level


  101 MMOL/L


()


 


Carbon Dioxide Level


  26 MMOL/L


(21-32)


 


Anion Gap


  11 mmol/L


(5-15)


 


Blood Urea Nitrogen


  9 mg/dL (7-18)


 


 


Creatinine


  0.8 MG/DL


(0.55-1.30)


 


Estimat Glomerular Filtration


Rate > 60 mL/min


(>60)


 


Glucose Level


  101 MG/DL


()


 


Calcium Level


  10.1 MG/DL


(8.5-10.1)











Current Medications








 Medications


  (Trade)  Dose


 Ordered  Sig/Indira


 Route


 PRN Reason  Start Time


 Stop Time Status Last Admin


Dose Admin


 


 Acetaminophen


  (Tylenol)  650 mg  Q4H  PRN


 ORAL


 fever  18 05:30


 18 05:29  18 12:55


 


 


 Al Hydroxide/Mg


 Hydroxide


  (Mylanta II)  30 ml  Q6H  PRN


 ORAL


 dyspepsia  18 05:30


 18 05:29   


 


 


 Cefazolin Sodium


 2 gm/Sodium


 Chloride  55 ml @ 


 110 mls/hr  ONCE  ONCE


 IVPB


   18 12:00


 18 12:29   


 


 


 Cefazolin Sodium


 2 gm/Sodium


 Chloride  55 ml @ 


 110 mls/hr  Q6HR


 IVP


   18 18:00


 18 17:59   


 


 


 Chlorhexidine


 Gluconate


  (Payal-Hex 2%)  1 applic  DAILY@2000


 TOPIC


   18 20:00


 18 19:59  18 20:14


 


 


 Dextrose


  (Dextrose 50%)    STAT  PRN


 IV


 Hypoglycemia  18 05:30


 18 05:29   


 


 


 Diphenhydramine


 HCl


  (Benadryl)  25 mg  Q6H  PRN


 ORAL


 Itching/Pruritis  18 05:30


 18 05:29   


 


 


 Folic Acid


  (Folate)  1 mg  DAILY


 ORAL


   18 09:00


 18 08:59  18 08:31


 


 


 Miconazole Nitrate


  (Monistat)  1 applic  BEDTIME


 VAGIN


   18 21:00


 18 20:59  18 20:19


 


 


 Morphine Sulfate


  (Morphine


 Sulfate)  4 mg  Q4H  PRN


 IVP


 Severe Pain (Pain Scale 7-10)  18 21:45


 2/3/18 21:44  18 08:21


 


 


 Nitroglycerin


  (Ntg)  0.4 mg  Q5M X 3 DOSES PRN


 SL


 Prn Chest Pain  18 05:30


 18 05:29   


 


 


 Ondansetron HCl


  (Zofran)  4 mg  Q6H  PRN


 IVP


 Nausea & Vomiting  18 05:30


 18 05:29  18 06:33


 


 


 Pantoprazole


  (Protonix)  40 mg  ACBREAKFAST


 ORAL


   18 06:30


 3/2/18 06:29  18 05:40


 


 


 Polyethylene


 Glycol


  (Miralax)  17 gm  HSPRN  PRN


 ORAL


 Constipation  18 05:30


 18 05:29  18 06:16


 


 


 Promethazine HCl


  (Phenergan)  25 mg  Q8H  PRN


 ORAL


 Nausea & Vomiting  18 10:45


 18 10:44   


 

















Karol Burton M.D. 2018 12:19

## 2018-01-31 NOTE — GI PROGRESS NOTE
Assessment/Plan


Problems:  


(1) Bloodstream infection due to port-a-cath


ICD Codes:  T80.211A - Bloodstream infection due to central venous catheter, 

initial encounter


SNOMED:  224297112


(2) Nausea & vomiting


ICD Codes:  R11.2 - Nausea with vomiting, unspecified


SNOMED:  34993577


(3) Breast CA


ICD Codes:  C50.919 - Malignant neoplasm of unspecified site of unspecified 

female breast


SNOMED:  751596504


(4) Abnormal LFTs


ICD Codes:  R94.5 - Abnormal results of liver function studies


SNOMED:  751092593


(5) Chemotherapy induced nausea and vomiting


ICD Codes:  R11.2 - Nausea with vomiting, unspecified; T45.1X5A - Adverse 

effect of antineoplastic and immunosuppressive drugs, initial encounter


SNOMED:  193881731


(6) Dehydration


ICD Codes:  E86.0 - Dehydration


SNOMED:  66441219


(7) Electrolyte imbalance


ICD Codes:  E87.8 - Other disorders of electrolyte and fluid balance, not 

elsewhere classified


SNOMED:  590480119


Status:  stable


Status Narrative


Discussed with Dr. Marie.


Assessment/Plan


CT reviewed, see full report >>  suggests thrombophlebitis.


iron deficiency >> venofer





okay for DC per GI standpoint


adv to regular diet, tolerating


PICC placed


abx


pain control


Phenergan and zofran prn


fu labs





Subjective


Subjective


N/V/D resolved





Objective





Last 24 Hour Vital Signs








  Date Time  Temp Pulse Resp B/P (MAP) Pulse Ox O2 Delivery O2 Flow Rate FiO2


 


1/31/18 08:14 98.6 104 20 112/74 95   


 


1/31/18 03:47 98.6 110 18 108/70 100   


 


1/30/18 23:57 97.5 98 20 143/94 96 Room Air  


 


1/30/18 19:34 97.9 94 20 118/72 100 Room Air  


 


1/30/18 16:00 97.5 104 20 126/88 97   


 


1/30/18 13:54 98.1       


 


1/30/18 12:00 99.0 105 18 112/73 96 Room Air  

















Intake and Output  


 


 1/30/18 1/31/18





 19:00 07:00


 


Intake Total 750 ml 900 ml


 


Balance 750 ml 900 ml


 


  


 


Intake Oral 750 ml 800 ml


 


IV Total  100 ml


 


# Voids 2 2











Laboratory Tests








Test


  1/31/18


05:40


 


White Blood Count


  11.2 K/UL


(4.8-10.8)  H


 


Red Blood Count


  3.21 M/UL


(4.20-5.40)  L


 


Hemoglobin


  9.5 G/DL


(12.0-16.0)  L


 


Hematocrit


  29.2 %


(37.0-47.0)  L


 


Mean Corpuscular Volume 91 FL (80-99)  


 


Mean Corpuscular Hemoglobin


  29.5 PG


(27.0-31.0)


 


Mean Corpuscular Hemoglobin


Concent 32.4 G/DL


(32.0-36.0)


 


Red Cell Distribution Width


  16.2 %


(11.6-14.8)  H


 


Platelet Count


  347 K/UL


(150-450)


 


Mean Platelet Volume


  6.6 FL


(6.5-10.1)


 


Neutrophils (%) (Auto)


  75.1 %


(45.0-75.0)  H


 


Lymphocytes (%) (Auto)


  16.8 %


(20.0-45.0)  L


 


Monocytes (%) (Auto)


  7.3 %


(1.0-10.0)


 


Eosinophils (%) (Auto)


  0.4 %


(0.0-3.0)


 


Basophils (%) (Auto)


  0.4 %


(0.0-2.0)


 


Sodium Level


  138 MMOL/L


(136-145)


 


Potassium Level


  4.2 MMOL/L


(3.5-5.1)


 


Chloride Level


  101 MMOL/L


()


 


Carbon Dioxide Level


  26 MMOL/L


(21-32)


 


Anion Gap


  11 mmol/L


(5-15)


 


Blood Urea Nitrogen


  9 mg/dL (7-18)


 


 


Creatinine


  0.8 MG/DL


(0.55-1.30)


 


Estimat Glomerular Filtration


Rate > 60 mL/min


(>60)


 


Glucose Level


  101 MG/DL


()


 


Calcium Level


  10.1 MG/DL


(8.5-10.1)








Height (Feet):  5


Height (Inches):  7.00


Weight (Pounds):  200


General Appearance:  WD/WN, no apparent distress, alert


Cardiovascular:  normal rate


Respiratory/Chest:  normal breath sounds, no respiratory distress


Abdominal Exam:  normal bowel sounds, non tender, soft


Extremities:  normal range of motion, non-tender











Maggi Montanez N.PMartina Jan 31, 2018 11:10

## 2018-01-31 NOTE — PULMONOLOGY PROGRESS NOTE
Assessment/Plan


Problems:  


(1) Bacteremia


Assessment & Plan:  persistent





(2) Septic thrombophlebitis


(3) Sepsis


(4) Chemotherapy induced nausea and vomiting


(5) Breast CA


(6) Bloodstream infection due to port-a-cath


Assessment/Plan


improving


on Cefazoline now


check cultures


all notes reviewed


dc planning in progress/


iv abx at home





Subjective


ROS Limited/Unobtainable:  No


Constitutional:  Reports: no symptoms


HEENT:  Repors: no symptoms


Allergies:  


Coded Allergies:  


     No Known Allergies (Unverified , 10/12/17)





Objective





Last 24 Hour Vital Signs








  Date Time  Temp Pulse Resp B/P (MAP) Pulse Ox O2 Delivery O2 Flow Rate FiO2


 


1/31/18 15:51 98.4 99 20 111/72 93   


 


1/31/18 11:42 98.0 92 20 112/74 95   


 


1/31/18 08:14 98.6 104 20 112/74 95   


 


1/31/18 03:47 98.6 110 18 108/70 100   


 


1/30/18 23:57 97.5 98 20 143/94 96 Room Air  


 


1/30/18 19:34 97.9 94 20 118/72 100 Room Air  

















Intake and Output  


 


 1/30/18 1/31/18





 19:00 07:00


 


Intake Total 750 ml 900 ml


 


Balance 750 ml 900 ml


 


  


 


Intake Oral 750 ml 800 ml


 


IV Total  100 ml


 


# Voids 2 2








Objective


General Appearance:  no acute distress, other - A/A/O x 3 morbidly obese AA 

male in NAD


HEENT:  normocephalic, atraumatic, anicteric, mucous membranes moist


Respiratory/Chest:  lungs clear -with moderate air exchange  no respiratory 

distress, no accessory muscle use, tenderness around Left upper chest area


Cardiovascular:  normal rate 


Abdomen:  normal bowel sounds, soft, non tender , obese


Extremities:  pedal pulses normal, ot


Neurologic/Psychiatric:  no motor/sensory deficits, alert, oriented x 3, 

responsive


Musculoskeletal:  normal muscle bulk


Laboratory Tests


1/31/18 05:40: 


White Blood Count 11.2H, Red Blood Count 3.21L, Hemoglobin 9.5L, Hematocrit 

29.2L, Mean Corpuscular Volume 91, Mean Corpuscular Hemoglobin 29.5, Mean 

Corpuscular Hemoglobin Concent 32.4, Red Cell Distribution Width 16.2H, 

Platelet Count 347, Mean Platelet Volume 6.6, Neutrophils (%) (Auto) 75.1H, 

Lymphocytes (%) (Auto) 16.8L, Monocytes (%) (Auto) 7.3, Eosinophils (%) (Auto) 

0.4, Basophils (%) (Auto) 0.4, Sodium Level 138, Potassium Level 4.2, Chloride 

Level 101, Carbon Dioxide Level 26, Anion Gap 11, Blood Urea Nitrogen 9, 

Creatinine 0.8, Estimat Glomerular Filtration Rate > 60, Glucose Level 101, 

Calcium Level 10.1





Current Medications








 Medications


  (Trade)  Dose


 Ordered  Sig/Indira


 Route


 PRN Reason  Start Time


 Stop Time Status Last Admin


Dose Admin


 


 Acetaminophen


  (Tylenol)  650 mg  Q4H  PRN


 ORAL


 fever  1/19/18 05:30


 2/18/18 05:29  1/30/18 12:55


 


 


 Al Hydroxide/Mg


 Hydroxide


  (Mylanta II)  30 ml  Q6H  PRN


 ORAL


 dyspepsia  1/19/18 05:30


 2/18/18 05:29   


 


 


 Cefazolin Sodium


 2 gm/Sodium


 Chloride  55 ml @ 


 110 mls/hr  Q6HR


 IVP


   1/31/18 18:00


 2/7/18 17:59   


 


 


 Chlorhexidine


 Gluconate


  (Payal-Hex 2%)  1 applic  DAILY@2000


 TOPIC


   1/26/18 20:00


 2/25/18 19:59  1/29/18 20:14


 


 


 Dextrose


  (Dextrose 50%)    STAT  PRN


 IV


 Hypoglycemia  1/19/18 05:30


 2/18/18 05:29   


 


 


 Diphenhydramine


 HCl


  (Benadryl)  25 mg  Q6H  PRN


 ORAL


 Itching/Pruritis  1/19/18 05:30


 2/18/18 05:29   


 


 


 Folic Acid


  (Folate)  1 mg  DAILY


 ORAL


   1/22/18 09:00


 2/21/18 08:59  1/31/18 08:31


 


 


 Miconazole Nitrate


  (Monistat)  1 applic  BEDTIME


 VAGIN


   1/29/18 21:00


 2/5/18 20:59  1/30/18 20:19


 


 


 Morphine Sulfate


  (Morphine


 Sulfate)  4 mg  Q4H  PRN


 IVP


 Severe Pain (Pain Scale 7-10)  1/27/18 21:45


 2/3/18 21:44  1/31/18 13:51


 


 


 Nitroglycerin


  (Ntg)  0.4 mg  Q5M X 3 DOSES PRN


 SL


 Prn Chest Pain  1/19/18 05:30


 2/18/18 05:29   


 


 


 Ondansetron HCl


  (Zofran)  4 mg  Q6H  PRN


 IVP


 Nausea & Vomiting  1/19/18 05:30


 2/18/18 05:29  1/27/18 06:33


 


 


 Pantoprazole


  (Protonix)  40 mg  ACBREAKFAST


 ORAL


   1/31/18 06:30


 3/2/18 06:29  1/31/18 05:40


 


 


 Polyethylene


 Glycol


  (Miralax)  17 gm  HSPRN  PRN


 ORAL


 Constipation  1/19/18 05:30


 2/18/18 05:29  1/25/18 06:16


 


 


 Promethazine HCl


  (Phenergan)  25 mg  Q8H  PRN


 ORAL


 Nausea & Vomiting  1/19/18 10:45


 2/18/18 10:44   


 


 


 Rivaroxaban


  (Xarelto)  15 mg  BID


 ORAL


   1/31/18 15:00


 3/2/18 14:59  1/31/18 14:44


 


 


 Temazepam


  (Restoril)  15 mg  HSPRN  PRN


 ORAL


 Insomnia  1/31/18 21:00


 2/7/18 20:59   


 

















JOE PRITCHETT Jan 31, 2018 18:46

## 2018-01-31 NOTE — INTERNAL MED PROGRESS NOTE
Subjective


Date of Service:  Jan 31, 2018


Physician Name


Kylah Olson


Attending Physician


Dave Christine MD





Current Medications








 Medications


  (Trade)  Dose


 Ordered  Sig/Indira


 Route


 PRN Reason  Start Time


 Stop Time Status Last Admin


Dose Admin


 


 Acetaminophen


  (Tylenol)  650 mg  Q4H  PRN


 ORAL


 fever  1/19/18 05:30


 2/18/18 05:29  1/30/18 12:55


 


 


 Al Hydroxide/Mg


 Hydroxide


  (Mylanta II)  30 ml  Q6H  PRN


 ORAL


 dyspepsia  1/19/18 05:30


 2/18/18 05:29   


 


 


 Cefazolin Sodium


 2 gm/Sodium


 Chloride  55 ml @ 


 110 mls/hr  ONCE  ONCE


 IVPB


   1/31/18 12:00


 1/31/18 12:29   


 


 


 Cefazolin Sodium


 2 gm/Sodium


 Chloride  55 ml @ 


 110 mls/hr  Q6HR


 IVP


   1/31/18 18:00


 2/7/18 17:59   


 


 


 Chlorhexidine


 Gluconate


  (Payal-Hex 2%)  1 applic  DAILY@2000


 TOPIC


   1/26/18 20:00


 2/25/18 19:59  1/29/18 20:14


 


 


 Dextrose


  (Dextrose 50%)    STAT  PRN


 IV


 Hypoglycemia  1/19/18 05:30


 2/18/18 05:29   


 


 


 Diphenhydramine


 HCl


  (Benadryl)  25 mg  Q6H  PRN


 ORAL


 Itching/Pruritis  1/19/18 05:30


 2/18/18 05:29   


 


 


 Folic Acid


  (Folate)  1 mg  DAILY


 ORAL


   1/22/18 09:00


 2/21/18 08:59  1/31/18 08:31


 


 


 Miconazole Nitrate


  (Monistat)  1 applic  BEDTIME


 VAGIN


   1/29/18 21:00


 2/5/18 20:59  1/30/18 20:19


 


 


 Morphine Sulfate


  (Morphine


 Sulfate)  4 mg  Q4H  PRN


 IVP


 Severe Pain (Pain Scale 7-10)  1/27/18 21:45


 2/3/18 21:44  1/31/18 08:21


 


 


 Nitroglycerin


  (Ntg)  0.4 mg  Q5M X 3 DOSES PRN


 SL


 Prn Chest Pain  1/19/18 05:30


 2/18/18 05:29   


 


 


 Ondansetron HCl


  (Zofran)  4 mg  Q6H  PRN


 IVP


 Nausea & Vomiting  1/19/18 05:30


 2/18/18 05:29  1/27/18 06:33


 


 


 Pantoprazole


  (Protonix)  40 mg  ACBREAKFAST


 ORAL


   1/31/18 06:30


 3/2/18 06:29  1/31/18 05:40


 


 


 Polyethylene


 Glycol


  (Miralax)  17 gm  HSPRN  PRN


 ORAL


 Constipation  1/19/18 05:30


 2/18/18 05:29  1/25/18 06:16


 


 


 Promethazine HCl


  (Phenergan)  25 mg  Q8H  PRN


 ORAL


 Nausea & Vomiting  1/19/18 10:45


 2/18/18 10:44   


 








Allergies:  


Coded Allergies:  


     No Known Allergies (Unverified , 10/12/17)


ROS Limited/Unobtainable:  No


Constitutional:  Reports: no symptoms


HEENT:  Reports: no symptoms


Cardiovascular:  Reports: no symptoms


Respiratory:  Reports: no symptoms


Gastrointestinal/Abdominal:  Reports: no symptoms


Genitourinary:  Reports: no symptoms


Neurologic/Psychiatric:  Reports: no symptoms


Subjective


31 YO F admitted with nausea and vomiting.  Now sepsis.  New DVT left upper 

extremity..  S/P Port A Cath removal 1/22/17.  .  Cover for Int Med-Dr Christine





Objective





Last Vital Signs








  Date Time  Temp Pulse Resp B/P (MAP) Pulse Ox O2 Delivery O2 Flow Rate FiO2


 


1/31/18 08:14 98.6 104 20 112/74 95   


 


1/30/18 23:57      Room Air  











Laboratory Tests








Test


  1/31/18


05:40


 


White Blood Count


  11.2 K/UL


(4.8-10.8)  H


 


Red Blood Count


  3.21 M/UL


(4.20-5.40)  L


 


Hemoglobin


  9.5 G/DL


(12.0-16.0)  L


 


Hematocrit


  29.2 %


(37.0-47.0)  L


 


Mean Corpuscular Volume 91 FL (80-99)  


 


Mean Corpuscular Hemoglobin


  29.5 PG


(27.0-31.0)


 


Mean Corpuscular Hemoglobin


Concent 32.4 G/DL


(32.0-36.0)


 


Red Cell Distribution Width


  16.2 %


(11.6-14.8)  H


 


Platelet Count


  347 K/UL


(150-450)


 


Mean Platelet Volume


  6.6 FL


(6.5-10.1)


 


Neutrophils (%) (Auto)


  75.1 %


(45.0-75.0)  H


 


Lymphocytes (%) (Auto)


  16.8 %


(20.0-45.0)  L


 


Monocytes (%) (Auto)


  7.3 %


(1.0-10.0)


 


Eosinophils (%) (Auto)


  0.4 %


(0.0-3.0)


 


Basophils (%) (Auto)


  0.4 %


(0.0-2.0)


 


Sodium Level


  138 MMOL/L


(136-145)


 


Potassium Level


  4.2 MMOL/L


(3.5-5.1)


 


Chloride Level


  101 MMOL/L


()


 


Carbon Dioxide Level


  26 MMOL/L


(21-32)


 


Anion Gap


  11 mmol/L


(5-15)


 


Blood Urea Nitrogen


  9 mg/dL (7-18)


 


 


Creatinine


  0.8 MG/DL


(0.55-1.30)


 


Estimat Glomerular Filtration


Rate > 60 mL/min


(>60)


 


Glucose Level


  101 MG/DL


()


 


Calcium Level


  10.1 MG/DL


(8.5-10.1)

















Intake and Output  


 


 1/30/18 1/31/18





 19:00 07:00


 


Intake Total 750 ml 900 ml


 


Balance 750 ml 900 ml


 


  


 


Intake Oral 750 ml 800 ml


 


IV Total  100 ml


 


# Voids 2 2








Objective


General Appearance:  WD/WN, alert, moderate distress


EENT:  PERRL/EOMI, normal ENT inspection


Neck:  non-tender, normal alignment, supple, normal inspection


Cardiovascular:  normal peripheral pulses, normal rate, regular rhythm, no 

gallop/murmur, no JVD


Respiratory/Chest:  chest wall non-tender, lungs clear, normal breath sounds, 

no respiratory distress, no accessory muscle use


Abdomen:  normal bowel sounds, non tender, soft, no organomegaly, no mass


Extremities:  normal range of motion, non-tender


Neurologic:  CNs II-XII grossly normal, no motor/sensory deficits


Skin:  normal pigmentation, warm/dry





Assessment/Plan


Problem List:  


(1) Nausea & vomiting


Assessment & Plan:  continue zofran





(2) Leukocytosis


Assessment & Plan:  Due to sepsis.  Antibiotic per ID





(3) Bloodstream infection due to port-a-cath


Assessment & Plan:  S/P removal port A Cath on 1/22/18.  See Surgery note.  

Daptomycin and Ancef day #12/42 per ID.  S/P PICC for 42 day antibiotic therapy 

(3/8/18)





(4) Sepsis


Assessment & Plan:  Staph aureus.  Await transesophageal echo to R/O 

endocarditis.  Continue daptomycin and ancef Day # 12/42 per ID (3/8/18)





(5) UTI (urinary tract infection)


(6) Breast CA


Assessment & Plan:  Currently on chemotherapy





(7) Fever


Assessment & Plan:  see ID note.





(8) Deep venous thrombosis of left upper extremity


Assessment & Plan:  ?remove PICC?  Await hematology recs.





Status:  not improved


Assessment/Plan


Hold Discharge Planning due to DVT:  home with home health for IV antibiotics











KYLAH OLSON Jan 31, 2018 11:37

## 2018-01-31 NOTE — GENERAL PROGRESS NOTE
Assessment/Plan


Assessment/Plan


#. Left upper extremity new picc associated dvt


---> continue on xarelto (started on 1/31/18)


#. Anemia secondary to iron deficiency --> Continue the patient on IV iron


--> Blood transfusion not required unless symptomatic or hgb <7. Has been >9


#. Pancytopenia likely secondary to recent chemotherapy.  In addition, the 

patient with infection.  


--> The patient remains stable 


--> Levels are improved. wbc and platelets WNL. Hemoglobin stable >9


--> Monitor and needs to complete full course of antibiotics


#. Breast cancer, status post chemotherapy with nausea, vomiting, and allergy 

reaction.


--> Cont. treatment as outpatient. 


--> Monitor closely. 


#. Septic thrombophlebitis.  


--> The patient currently on broad-spectrum antibiotics.  


--> Continue to closely monitor.


#. Sepsis with bacteremia, has been seen by ID Service.


--> On vancomycin


#. Nausea and vomiting, likely secondary to chemotherapy reaction.  


--> We will continue to closely follow


#. Dehydration.  


--> Continue fluids as needed.





Subjective


HEENT:  Denies: no symptoms, eye pain, blurred vision, tearing, double vision, 

ear pain, ear discharge, nose pain, nose congestion, throat pain, throat 

swelling, mouth pain, mouth swelling, other


Cardiovascular:  Denies: no symptoms, chest pain, edema, irregular heart rate, 

lightheadedness, palpitations, syncope, other


Respiratory:  Denies: no symptoms, cough, orthopnea, shortness of breath, SOB 

with excertion, SOB at rest, sputum, stridor, wheezing, other


Gastrointestinal/Abdominal:  Denies: no symptoms, abdomen distended, abdominal 

pain, black stools, tarry stools, blood in stool, constipated, diarrhea, 

difficulty swallowing, nausea, poor appetite, poor fluid intake, rectal bleeding

, vomiting, other


Genitourinary:  Denies: no symptoms, burning, discharge, frequency, flank pain, 

hematuria, incontinence, pain, urgency, other


Neurologic/Psychiatric:  Denies: no symptoms, anxiety, depressed, emotional 

problems, headache, numbness, paresthesia, pre-existing deficit, seizure, 

tingling, tremors, weakness, other


Endocrine:  Denies: no symptoms, excessive sweating, flushing, intolerance to 

cold, intolerance to heat, increased hunger, increased thirst, increased urine, 

unexplained weight gain, unexplained weight loss, other


Hematologic/Lymphatic:  Denies: no symptoms, anemia, easy bleeding, easy 

bruising, other


Allergies:  


Coded Allergies:  


     No Known Allergies (Unverified , 10/12/17)


Subjective


On abx IV. No major events overnight. new picc associated dvt





Objective





Last 24 Hour Vital Signs








  Date Time  Temp Pulse Resp B/P (MAP) Pulse Ox O2 Delivery O2 Flow Rate FiO2


 


1/31/18 11:42 98.0 92 20 112/74 95   


 


1/31/18 08:14 98.6 104 20 112/74 95   


 


1/31/18 03:47 98.6 110 18 108/70 100   


 


1/30/18 23:57 97.5 98 20 143/94 96 Room Air  


 


1/30/18 19:34 97.9 94 20 118/72 100 Room Air  


 


1/30/18 16:00 97.5 104 20 126/88 97   


 


1/30/18 13:54 98.1       

















Intake and Output  


 


 1/30/18 1/31/18





 19:00 07:00


 


Intake Total 750 ml 900 ml


 


Balance 750 ml 900 ml


 


  


 


Intake Oral 750 ml 800 ml


 


IV Total  100 ml


 


# Voids 2 2








Laboratory Tests


1/31/18 05:40: 


White Blood Count 11.2H, Red Blood Count 3.21L, Hemoglobin 9.5L, Hematocrit 

29.2L, Mean Corpuscular Volume 91, Mean Corpuscular Hemoglobin 29.5, Mean 

Corpuscular Hemoglobin Concent 32.4, Red Cell Distribution Width 16.2H, 

Platelet Count 347, Mean Platelet Volume 6.6, Neutrophils (%) (Auto) 75.1H, 

Lymphocytes (%) (Auto) 16.8L, Monocytes (%) (Auto) 7.3, Eosinophils (%) (Auto) 

0.4, Basophils (%) (Auto) 0.4, Sodium Level 138, Potassium Level 4.2, Chloride 

Level 101, Carbon Dioxide Level 26, Anion Gap 11, Blood Urea Nitrogen 9, 

Creatinine 0.8, Estimat Glomerular Filtration Rate > 60, Glucose Level 101, 

Calcium Level 10.1


Height (Feet):  5


Height (Inches):  7.00


Weight (Pounds):  200


General Appearance:  alert


EENT:  normal ENT inspection


Cardiovascular:  regular rhythm


Respiratory/Chest:  normal breath sounds


Abdomen:  no organomegaly


Extremities:  non-tender


Edema:  1+ Leg (L), 1+ Leg (R)


Edema:  mild edema


Neurologic:  alert


Skin:  warm/dry











Wagner West Jan 31, 2018 13:29

## 2018-02-01 VITALS — DIASTOLIC BLOOD PRESSURE: 73 MMHG | SYSTOLIC BLOOD PRESSURE: 113 MMHG

## 2018-02-01 VITALS — DIASTOLIC BLOOD PRESSURE: 72 MMHG | SYSTOLIC BLOOD PRESSURE: 117 MMHG

## 2018-02-01 VITALS — DIASTOLIC BLOOD PRESSURE: 69 MMHG | SYSTOLIC BLOOD PRESSURE: 110 MMHG

## 2018-02-01 VITALS — SYSTOLIC BLOOD PRESSURE: 109 MMHG | DIASTOLIC BLOOD PRESSURE: 76 MMHG

## 2018-02-01 VITALS — DIASTOLIC BLOOD PRESSURE: 68 MMHG | SYSTOLIC BLOOD PRESSURE: 114 MMHG

## 2018-02-01 LAB
ADD MANUAL DIFF: NO
ANION GAP SERPL CALC-SCNC: 9 MMOL/L (ref 5–15)
BASOPHILS NFR BLD AUTO: 0.8 % (ref 0–2)
BUN SERPL-MCNC: 6 MG/DL (ref 7–18)
CALCIUM SERPL-MCNC: 9.6 MG/DL (ref 8.5–10.1)
CHLORIDE SERPL-SCNC: 101 MMOL/L (ref 98–107)
CO2 SERPL-SCNC: 26 MMOL/L (ref 21–32)
CREAT SERPL-MCNC: 0.7 MG/DL (ref 0.55–1.3)
EOSINOPHIL NFR BLD AUTO: 0.7 % (ref 0–3)
ERYTHROCYTE [DISTWIDTH] IN BLOOD BY AUTOMATED COUNT: 15.7 % (ref 11.6–14.8)
HCT VFR BLD CALC: 29.3 % (ref 37–47)
HGB BLD-MCNC: 9.5 G/DL (ref 12–16)
LYMPHOCYTES NFR BLD AUTO: 19 % (ref 20–45)
MCV RBC AUTO: 91 FL (ref 80–99)
MONOCYTES NFR BLD AUTO: 6 % (ref 1–10)
NEUTROPHILS NFR BLD AUTO: 73.5 % (ref 45–75)
PLATELET # BLD: 322 K/UL (ref 150–450)
POTASSIUM SERPL-SCNC: 3.7 MMOL/L (ref 3.5–5.1)
RBC # BLD AUTO: 3.23 M/UL (ref 4.2–5.4)
SODIUM SERPL-SCNC: 136 MMOL/L (ref 136–145)
WBC # BLD AUTO: 10.4 K/UL (ref 4.8–10.8)

## 2018-02-01 RX ADMIN — SODIUM CHLORIDE SCH MLS/HR: 0.9 INJECTION INTRAVENOUS at 17:28

## 2018-02-01 RX ADMIN — MORPHINE SULFATE PRN MG: 4 INJECTION INTRAVENOUS at 16:44

## 2018-02-01 RX ADMIN — RIVAROXABAN SCH MG: 15 TABLET, FILM COATED ORAL at 17:28

## 2018-02-01 RX ADMIN — SODIUM CHLORIDE SCH MLS/HR: 0.9 INJECTION INTRAVENOUS at 00:15

## 2018-02-01 RX ADMIN — RIVAROXABAN SCH MG: 15 TABLET, FILM COATED ORAL at 08:09

## 2018-02-01 RX ADMIN — SODIUM CHLORIDE SCH MLS/HR: 0.9 INJECTION INTRAVENOUS at 11:55

## 2018-02-01 RX ADMIN — MORPHINE SULFATE PRN MG: 4 INJECTION INTRAVENOUS at 08:09

## 2018-02-01 RX ADMIN — SODIUM CHLORIDE SCH MLS/HR: 0.9 INJECTION INTRAVENOUS at 06:03

## 2018-02-01 RX ADMIN — MORPHINE SULFATE PRN MG: 4 INJECTION INTRAVENOUS at 12:26

## 2018-02-01 NOTE — INFECTIOUS DISEASES PROG NOTE
Assessment/Plan


Assessment/Plan








Sepsis , SP 


Bacteremia Staph A /  line infection, / persistent due to septic 

thrombophlebitis  , ( all surg, Wnd  and blood cx are : MSSA , repeat Culture 

on  previously reported as MRSA, now corrected to MSSA) 


  2d Echo no vegetation


  -s/p port removal ; OR wnd Cx :+4 MSSA 


    -OR findings:The port had a capsule around it and a fluid collection within 

the capsule. Fluid collection was evacuated and fibrinous purulent exudate was 

taken.


   wnd Cx ( over the port) : MSSA


  CT: Implanted infusion port in the left chest. No definite fluid collection 

around the port. However, there appears to be occlusion of the left jugular 

vein as well as the left brachiocephalic vein with stranding around the 

brachiocephalic vein. This suggests thrombophlebitis.


  - BCx  MSSA, , , ;  neg, 27 NTD





Fever, improving


    -u/a wbc 5/10; Ucx mixed gram positive growth


    -Influenza neg


    -CXR no focal consolidation


.


Leukocytosis, mild recurrent; imrpoving





LUE PICC line associated DVT (Internal jugular and SCV)





Nausea, vomiting/diarrhea (probably due to chemo vs related to sepsis)- resolved


   -Abd US: Hepatosplenomegaly.Otherwise, unremarkable abdominal sonogram





Elevated LFTs, resolved








-s/p PICC line placement 





- Right breast mastectomy, status post lumpectomy.


-. History of  and tubal ligation.


-. Status post Port-A-Cath placement.








PLAN:





continue  IV  Ancef  ( AB Rx d# 13 ) will treat for 6 weeks from 1st negative 

BCx on  for septic thrombophlebitis (end date 3/8/18) 


  -weekly CBC, CMP





    - SP Daptomycin #3


    - SP Vancomycin d # 7


    -  SP  Zosyn # 4 


    - SP Ceftriaxone x1





-LUE DVT and PICC management per PCP and heme-onc


 -started on AC


 -line can be retained if functioning per guidelines





-Hold chemo until completion of  abx treatment.


f/u final cx ( Bl ) 


Monitor CBC and CMP 


if persistent bacteremia :   RICHARD is optional to Ro jeffery-valve abscess 








Subjective


Allergies:  


Coded Allergies:  


     No Known Allergies (Unverified , 10/12/17)


Subjective


afebrile in > 72hrs


leukocytosis resolved


repeat Bcx  neg, 27 NTD





Objective


Vital Signs





Last 24 Hour Vital Signs








  Date Time  Temp Pulse Resp B/P (MAP) Pulse Ox O2 Delivery O2 Flow Rate FiO2


 


18 08:10 98.0 99 18 110/69 100   


 


18 04:00 98.2 105 18 113/73 100   


 


18 00:00 97.7 98 18 109/76 96   


 


18 20:00 98.7  20 134/77 100   


 


18 15:51 98.4 99 20 111/72 93   


 


18 11:42 98.0 92 20 112/74 95   








Height (Feet):  5


Height (Inches):  7.00


Weight (Pounds):  200


Objective


HEENT:  No pale conjunctivae.  No icterus.


NECK:  No lymphadenopathy.


CHEST:  Clear.


HEART:  S1 and S2.


ABDOMEN:  Soft and nontender.


EXTREMITIES:  No cyanosis at this time. L arm with PICC line, +swelling and TTP

, no signs of infection


NEUROLOGIC:  Awake and alert.





Laboratory Tests








Test


  18


07:45


 


White Blood Count


  10.4 K/UL


(4.8-10.8)


 


Red Blood Count


  3.23 M/UL


(4.20-5.40)  L


 


Hemoglobin


  9.5 G/DL


(12.0-16.0)  L


 


Hematocrit


  29.3 %


(37.0-47.0)  L


 


Mean Corpuscular Volume 91 FL (80-99)  


 


Mean Corpuscular Hemoglobin


  29.6 PG


(27.0-31.0)


 


Mean Corpuscular Hemoglobin


Concent 32.6 G/DL


(32.0-36.0)


 


Red Cell Distribution Width


  15.7 %


(11.6-14.8)  H


 


Platelet Count


  322 K/UL


(150-450)


 


Mean Platelet Volume


  7.3 FL


(6.5-10.1)


 


Neutrophils (%) (Auto)


  73.5 %


(45.0-75.0)


 


Lymphocytes (%) (Auto)


  19.0 %


(20.0-45.0)  L


 


Monocytes (%) (Auto)


  6.0 %


(1.0-10.0)


 


Eosinophils (%) (Auto)


  0.7 %


(0.0-3.0)


 


Basophils (%) (Auto)


  0.8 %


(0.0-2.0)


 


Sodium Level


  136 MMOL/L


(136-145)


 


Potassium Level


  3.7 MMOL/L


(3.5-5.1)


 


Chloride Level


  101 MMOL/L


()


 


Carbon Dioxide Level


  26 MMOL/L


(21-32)


 


Anion Gap


  9 mmol/L


(5-15)


 


Blood Urea Nitrogen


  6 mg/dL (7-18)


L


 


Creatinine


  0.7 MG/DL


(0.55-1.30)


 


Estimat Glomerular Filtration


Rate > 60 mL/min


(>60)


 


Glucose Level


  97 MG/DL


()


 


Calcium Level


  9.6 MG/DL


(8.5-10.1)











Current Medications








 Medications


  (Trade)  Dose


 Ordered  Sig/Indira


 Route


 PRN Reason  Start Time


 Stop Time Status Last Admin


Dose Admin


 


 Acetaminophen


  (Tylenol)  650 mg  Q4H  PRN


 ORAL


 fever  18 05:30


 18 05:29  18 12:55


 


 


 Al Hydroxide/Mg


 Hydroxide


  (Mylanta II)  30 ml  Q6H  PRN


 ORAL


 dyspepsia  18 05:30


 18 05:29   


 


 


 Cefazolin Sodium


 2 gm/Sodium


 Chloride  55 ml @ 


 110 mls/hr  Q6HR


 IVP


   18 18:00


 18 17:59  18 06:03


 


 


 Cetylpyridinium


 Chloride


  (Cepacol)  1 lozg  Q2H  PRN


 MAGGIE


 For Cough  18 19:00


 3/2/18 18:59  18 21:17


 


 


 Chlorhexidine


 Gluconate


  (Payal-Hex 2%)  1 applic  DAILY@2000


 TOPIC


   18 20:00


 18 19:59  18 21:17


 


 


 Dextrose


  (Dextrose 50%)    STAT  PRN


 IV


 Hypoglycemia  18 05:30


 18 05:29   


 


 


 Diphenhydramine


 HCl


  (Benadryl)  25 mg  Q6H  PRN


 ORAL


 Itching/Pruritis  18 05:30


 18 05:29   


 


 


 Folic Acid


  (Folate)  1 mg  DAILY


 ORAL


   18 09:00


 18 08:59  18 08:09


 


 


 Miconazole Nitrate


  (Monistat)  1 applic  BEDTIME


 VAGIN


   18 21:00


 18 20:59  18 21:17


 


 


 Morphine Sulfate


  (Morphine


 Sulfate)  4 mg  Q4H  PRN


 IVP


 Severe Pain (Pain Scale 7-10)  18 21:45


 2/3/18 21:44  18 08:09


 


 


 Nitroglycerin


  (Ntg)  0.4 mg  Q5M X 3 DOSES PRN


 SL


 Prn Chest Pain  18 05:30


 18 05:29   


 


 


 Ondansetron HCl


  (Zofran)  4 mg  Q6H  PRN


 IVP


 Nausea & Vomiting  18 05:30


 18 05:29  18 06:33


 


 


 Pantoprazole


  (Protonix)  40 mg  ACBREAKFAST


 ORAL


   18 06:30


 3/2/18 06:29  18 06:48


 


 


 Polyethylene


 Glycol


  (Miralax)  17 gm  HSPRN  PRN


 ORAL


 Constipation  18 05:30


 18 05:29  18 06:16


 


 


 Promethazine HCl


  (Phenergan)  25 mg  Q8H  PRN


 ORAL


 Nausea & Vomiting  18 10:45


 18 10:44   


 


 


 Rivaroxaban


  (Xarelto)  15 mg  BID


 ORAL


   18 15:00


 3/2/18 14:59  18 08:09


 


 


 Temazepam


  (Restoril)  15 mg  HSPRN  PRN


 ORAL


 Insomnia  18 21:00


 18 20:59  18 00:23


 

















Karol Burton M.D. 2018 11:24

## 2018-02-01 NOTE — INTERNAL MED PROGRESS NOTE
Subjective


Date of Service:  Feb 1, 2018


Physician Name


Kylah Olson


Attending Physician


Dave Christine MD





Current Medications








 Medications


  (Trade)  Dose


 Ordered  Sig/Indira


 Route


 PRN Reason  Start Time


 Stop Time Status Last Admin


Dose Admin


 


 Acetaminophen


  (Tylenol)  650 mg  Q4H  PRN


 ORAL


 fever  1/19/18 05:30


 2/18/18 05:29  1/30/18 12:55


 


 


 Al Hydroxide/Mg


 Hydroxide


  (Mylanta II)  30 ml  Q6H  PRN


 ORAL


 dyspepsia  1/19/18 05:30


 2/18/18 05:29   


 


 


 Cefazolin Sodium


 2 gm/Sodium


 Chloride  55 ml @ 


 110 mls/hr  Q6HR


 IVP


   1/31/18 18:00


 2/7/18 17:59  2/1/18 11:55


 


 


 Cetylpyridinium


 Chloride


  (Cepacol)  1 lozg  Q2H  PRN


 MAGGIE


 For Cough  1/31/18 19:00


 3/2/18 18:59  2/1/18 11:16


 


 


 Chlorhexidine


 Gluconate


  (Payal-Hex 2%)  1 applic  DAILY@2000


 TOPIC


   1/26/18 20:00


 2/25/18 19:59  1/31/18 21:17


 


 


 Dextrose


  (Dextrose 50%)    STAT  PRN


 IV


 Hypoglycemia  1/19/18 05:30


 2/18/18 05:29   


 


 


 Diphenhydramine


 HCl


  (Benadryl)  25 mg  Q6H  PRN


 ORAL


 Itching/Pruritis  1/19/18 05:30


 2/18/18 05:29   


 


 


 Folic Acid


  (Folate)  1 mg  DAILY


 ORAL


   1/22/18 09:00


 2/21/18 08:59  2/1/18 08:09


 


 


 Miconazole Nitrate


  (Monistat)  1 applic  BEDTIME


 VAGIN


   1/29/18 21:00


 2/5/18 20:59  1/31/18 21:17


 


 


 Morphine Sulfate


  (Morphine


 Sulfate)  4 mg  Q4H  PRN


 IVP


 Severe Pain (Pain Scale 7-10)  1/27/18 21:45


 2/3/18 21:44  2/1/18 12:26


 


 


 Nitroglycerin


  (Ntg)  0.4 mg  Q5M X 3 DOSES PRN


 SL


 Prn Chest Pain  1/19/18 05:30


 2/18/18 05:29   


 


 


 Ondansetron HCl


  (Zofran)  4 mg  Q6H  PRN


 IVP


 Nausea & Vomiting  1/19/18 05:30


 2/18/18 05:29  1/27/18 06:33


 


 


 Pantoprazole


  (Protonix)  40 mg  ACBREAKFAST


 ORAL


   1/31/18 06:30


 3/2/18 06:29  2/1/18 06:48


 


 


 Polyethylene


 Glycol


  (Miralax)  17 gm  HSPRN  PRN


 ORAL


 Constipation  1/19/18 05:30


 2/18/18 05:29  1/25/18 06:16


 


 


 Promethazine HCl


  (Phenergan)  25 mg  Q8H  PRN


 ORAL


 Nausea & Vomiting  1/19/18 10:45


 2/18/18 10:44   


 


 


 Rivaroxaban


  (Xarelto)  15 mg  BID


 ORAL


   1/31/18 15:00


 3/2/18 14:59  2/1/18 08:09


 


 


 Temazepam


  (Restoril)  15 mg  HSPRN  PRN


 ORAL


 Insomnia  1/31/18 21:00


 2/7/18 20:59  2/1/18 00:23


 








Allergies:  


Coded Allergies:  


     No Known Allergies (Unverified , 10/12/17)


ROS Limited/Unobtainable:  No


Constitutional:  Reports: no symptoms


HEENT:  Reports: no symptoms


Cardiovascular:  Reports: no symptoms


Respiratory:  Reports: no symptoms


Gastrointestinal/Abdominal:  Reports: no symptoms


Genitourinary:  Reports: no symptoms


Neurologic/Psychiatric:  Reports: no symptoms


Subjective


33 YO F admitted with nausea and vomiting.  Now sepsis.  New DVT left upper 

extremity..  S/P Port A Cath removal 1/22/17.  .  Cover for [facility]-Dr Christine  

Await discharge home today





Objective





Last Vital Signs








  Date Time  Temp Pulse Resp B/P (MAP) Pulse Ox O2 Delivery O2 Flow Rate FiO2


 


2/1/18 11:25 97.7 106 18 117/72 100   


 


1/30/18 23:57      Room Air  











Laboratory Tests








Test


  2/1/18


07:45


 


White Blood Count


  10.4 K/UL


(4.8-10.8)


 


Red Blood Count


  3.23 M/UL


(4.20-5.40)  L


 


Hemoglobin


  9.5 G/DL


(12.0-16.0)  L


 


Hematocrit


  29.3 %


(37.0-47.0)  L


 


Mean Corpuscular Volume 91 FL (80-99)  


 


Mean Corpuscular Hemoglobin


  29.6 PG


(27.0-31.0)


 


Mean Corpuscular Hemoglobin


Concent 32.6 G/DL


(32.0-36.0)


 


Red Cell Distribution Width


  15.7 %


(11.6-14.8)  H


 


Platelet Count


  322 K/UL


(150-450)


 


Mean Platelet Volume


  7.3 FL


(6.5-10.1)


 


Neutrophils (%) (Auto)


  73.5 %


(45.0-75.0)


 


Lymphocytes (%) (Auto)


  19.0 %


(20.0-45.0)  L


 


Monocytes (%) (Auto)


  6.0 %


(1.0-10.0)


 


Eosinophils (%) (Auto)


  0.7 %


(0.0-3.0)


 


Basophils (%) (Auto)


  0.8 %


(0.0-2.0)


 


Sodium Level


  136 MMOL/L


(136-145)


 


Potassium Level


  3.7 MMOL/L


(3.5-5.1)


 


Chloride Level


  101 MMOL/L


()


 


Carbon Dioxide Level


  26 MMOL/L


(21-32)


 


Anion Gap


  9 mmol/L


(5-15)


 


Blood Urea Nitrogen


  6 mg/dL (7-18)


L


 


Creatinine


  0.7 MG/DL


(0.55-1.30)


 


Estimat Glomerular Filtration


Rate > 60 mL/min


(>60)


 


Glucose Level


  97 MG/DL


()


 


Calcium Level


  9.6 MG/DL


(8.5-10.1)

















Intake and Output  


 


 1/31/18 2/1/18





 19:00 07:00


 


Intake Total 775 ml 110 ml


 


Balance 775 ml 110 ml


 


  


 


Intake Oral 720 ml 


 


IV Total 55 ml 110 ml


 


# Voids 3 4








Objective


General Appearance:  WD/WN, alert, moderate distress


EENT:  PERRL/EOMI, normal ENT inspection


Neck:  non-tender, normal alignment, supple, normal inspection


Cardiovascular:  normal peripheral pulses, normal rate, regular rhythm, no 

gallop/murmur, no JVD


Respiratory/Chest:  chest wall non-tender, lungs clear, normal breath sounds, 

no respiratory distress, no accessory muscle use


Abdomen:  normal bowel sounds, non tender, soft, no organomegaly, no mass


Extremities:  normal range of motion, non-tender


Neurologic:  CNs II-XII grossly normal, no motor/sensory deficits


Skin:  normal pigmentation, warm/dry





Assessment/Plan


Problem List:  


(1) Nausea & vomiting


Assessment & Plan:  continue zofran





(2) Leukocytosis


Assessment & Plan:  Due to sepsis.  Antibiotic per ID





(3) Bloodstream infection due to port-a-cath


Assessment & Plan:  S/P removal port A Cath on 1/22/18.  See Surgery note.  

Daptomycin and Ancef day #13/42 per ID.  S/P PICC for 42 day antibiotic therapy 

(3/8/18)





(4) Sepsis


Assessment & Plan:  Staph aureus.  Await transesophageal echo to R/O 

endocarditis.  Continue daptomycin and ancef Day # 13/42 per ID (3/8/18)





(5) UTI (urinary tract infection)


(6) Breast CA


Assessment & Plan:  Currently on chemotherapy





(7) Fever


Assessment & Plan:  see ID note.





(8) Deep venous thrombosis of left upper extremity


Assessment & Plan:  Xarelto 15 mg BID per hematology.  F/U with Dr West





Assessment/Plan


Hold Discharge Planning due to DVT:  home with home health for IV antibiotics











KYLAH OLSON Feb 1, 2018 15:44

## 2018-02-01 NOTE — GENERAL PROGRESS NOTE
Assessment/Plan


Status:  unchanged


Assessment/Plan


#. Left upper extremity new picc associated dvt


---> continue on xarelto (started on 1/31/18)


--> Continue to monitor closely. 


#. Anemia secondary to iron deficiency 


--> Continue the patient on IV iron


--> Blood transfusion not required unless symptomatic or hgb <7. Has been >9


#. Pancytopenia likely secondary to recent chemotherapy.  In addition, the 

patient with infection.  


--> The patient remains stable 


--> Levels are improved. wbc and platelets WNL. Hemoglobin stable >9


--> Monitor and needs to complete full course of antibiotics


#. Breast cancer, status post chemotherapy with nausea, vomiting, and allergy 

reaction.


--> Cont. treatment as outpatient. 


--> Monitor closely. 


#. Septic thrombophlebitis.  


--> The patient currently on broad-spectrum antibiotics.  


--> Continue to closely monitor.


#. Sepsis with bacteremia, has been seen by ID Service.


--> On vancomycin


#. Nausea and vomiting, likely secondary to chemotherapy reaction.  


--> We will continue to closely follow


#. Dehydration.  


--> Continue fluids as needed.





Subjective


Date patient seen:  Feb 1, 2018


Constitutional:  Denies: no symptoms, chills, diaphoresis, fever, malaise, 

weakness, other


HEENT:  Denies: no symptoms, eye pain, blurred vision, tearing, double vision, 

ear pain, ear discharge, nose pain, nose congestion, throat pain, throat 

swelling, mouth pain, mouth swelling, other


Cardiovascular:  Denies: no symptoms, chest pain, edema, irregular heart rate, 

lightheadedness, palpitations, syncope, other


Respiratory:  Denies: no symptoms, cough, orthopnea, shortness of breath, SOB 

with excertion, SOB at rest, sputum, stridor, wheezing, other


Gastrointestinal/Abdominal:  Denies: no symptoms, abdomen distended, abdominal 

pain, black stools, tarry stools, blood in stool, constipated, diarrhea, 

difficulty swallowing, nausea, poor appetite, poor fluid intake, rectal bleeding

, vomiting, other


Genitourinary:  Denies: no symptoms, burning, discharge, frequency, flank pain, 

hematuria, incontinence, pain, urgency, other


Hematologic/Lymphatic:  Reports: anemia


Allergies:  


Coded Allergies:  


     No Known Allergies (Unverified , 10/12/17)


Subjective


New picc associated DVT. No fever. On anticoag.





Objective





Last 24 Hour Vital Signs








  Date Time  Temp Pulse Resp B/P (MAP) Pulse Ox O2 Delivery O2 Flow Rate FiO2


 


2/1/18 16:05 97.0 102 18 114/68 100   


 


2/1/18 11:25 97.7 106 18 117/72 100   


 


2/1/18 08:10 98.0 99 18 110/69 100   


 


2/1/18 04:00 98.2 105 18 113/73 100   


 


2/1/18 00:00 97.7 98 18 109/76 96   

















Intake and Output  


 


 1/31/18 2/1/18





 19:00 07:00


 


Intake Total 775 ml 110 ml


 


Balance 775 ml 110 ml


 


  


 


Intake Oral 720 ml 


 


IV Total 55 ml 110 ml


 


# Voids 3 4








Laboratory Tests


2/1/18 07:45: 


White Blood Count 10.4, Red Blood Count 3.23L, Hemoglobin 9.5L, Hematocrit 29.3L

, Mean Corpuscular Volume 91, Mean Corpuscular Hemoglobin 29.6, Mean 

Corpuscular Hemoglobin Concent 32.6, Red Cell Distribution Width 15.7H, 

Platelet Count 322, Mean Platelet Volume 7.3, Neutrophils (%) (Auto) 73.5, 

Lymphocytes (%) (Auto) 19.0L, Monocytes (%) (Auto) 6.0, Eosinophils (%) (Auto) 

0.7, Basophils (%) (Auto) 0.8, Sodium Level 136, Potassium Level 3.7, Chloride 

Level 101, Carbon Dioxide Level 26, Anion Gap 9, Blood Urea Nitrogen 6L, 

Creatinine 0.7, Estimat Glomerular Filtration Rate > 60, Glucose Level 97, 

Calcium Level 9.6


Height (Feet):  5


Height (Inches):  7.00


Weight (Pounds):  200


General Appearance:  no apparent distress


Respiratory/Chest:  decreased breath sounds


Abdomen:  non tender


Skin:  warm/dry











Wagner West Feb 1, 2018 23:22

## 2018-02-01 NOTE — GI PROGRESS NOTE
Assessment/Plan


Problems:  


(1) Bloodstream infection due to port-a-cath


ICD Codes:  T80.211A - Bloodstream infection due to central venous catheter, 

initial encounter


SNOMED:  734961867


(2) Nausea & vomiting


ICD Codes:  R11.2 - Nausea with vomiting, unspecified


SNOMED:  29498080


(3) Breast CA


ICD Codes:  C50.919 - Malignant neoplasm of unspecified site of unspecified 

female breast


SNOMED:  195920006


(4) Abnormal LFTs


ICD Codes:  R94.5 - Abnormal results of liver function studies


SNOMED:  718077841


(5) Chemotherapy induced nausea and vomiting


ICD Codes:  R11.2 - Nausea with vomiting, unspecified; T45.1X5A - Adverse 

effect of antineoplastic and immunosuppressive drugs, initial encounter


SNOMED:  473815714


(6) Dehydration


ICD Codes:  E86.0 - Dehydration


SNOMED:  81814938


(7) Electrolyte imbalance


ICD Codes:  E87.8 - Other disorders of electrolyte and fluid balance, not 

elsewhere classified


SNOMED:  375864272


Status:  stable


Status Narrative


Discussed with Dr. Marie.


Assessment/Plan


CT reviewed, see full report >>  suggests thrombophlebitis.


iron deficiency >> venofer





okay for DC per GI standpoint


adv to regular diet, tolerating


PICC placed


abx


pain control


Phenergan and zofran prn


fu labs





Subjective


Subjective


N/V/D resolved





Objective





Last 24 Hour Vital Signs








  Date Time  Temp Pulse Resp B/P (MAP) Pulse Ox O2 Delivery O2 Flow Rate FiO2


 


2/1/18 11:25 97.7 106 18 117/72 100   


 


2/1/18 08:10 98.0 99 18 110/69 100   


 


2/1/18 04:00 98.2 105 18 113/73 100   


 


2/1/18 00:00 97.7 98 18 109/76 96   


 


1/31/18 20:00 98.7  20 134/77 100   


 


1/31/18 15:51 98.4 99 20 111/72 93   

















Intake and Output  


 


 1/31/18 2/1/18





 19:00 07:00


 


Intake Total 775 ml 110 ml


 


Balance 775 ml 110 ml


 


  


 


Intake Oral 720 ml 


 


IV Total 55 ml 110 ml


 


# Voids 3 4











Laboratory Tests








Test


  2/1/18


07:45


 


White Blood Count


  10.4 K/UL


(4.8-10.8)


 


Red Blood Count


  3.23 M/UL


(4.20-5.40)  L


 


Hemoglobin


  9.5 G/DL


(12.0-16.0)  L


 


Hematocrit


  29.3 %


(37.0-47.0)  L


 


Mean Corpuscular Volume 91 FL (80-99)  


 


Mean Corpuscular Hemoglobin


  29.6 PG


(27.0-31.0)


 


Mean Corpuscular Hemoglobin


Concent 32.6 G/DL


(32.0-36.0)


 


Red Cell Distribution Width


  15.7 %


(11.6-14.8)  H


 


Platelet Count


  322 K/UL


(150-450)


 


Mean Platelet Volume


  7.3 FL


(6.5-10.1)


 


Neutrophils (%) (Auto)


  73.5 %


(45.0-75.0)


 


Lymphocytes (%) (Auto)


  19.0 %


(20.0-45.0)  L


 


Monocytes (%) (Auto)


  6.0 %


(1.0-10.0)


 


Eosinophils (%) (Auto)


  0.7 %


(0.0-3.0)


 


Basophils (%) (Auto)


  0.8 %


(0.0-2.0)


 


Sodium Level


  136 MMOL/L


(136-145)


 


Potassium Level


  3.7 MMOL/L


(3.5-5.1)


 


Chloride Level


  101 MMOL/L


()


 


Carbon Dioxide Level


  26 MMOL/L


(21-32)


 


Anion Gap


  9 mmol/L


(5-15)


 


Blood Urea Nitrogen


  6 mg/dL (7-18)


L


 


Creatinine


  0.7 MG/DL


(0.55-1.30)


 


Estimat Glomerular Filtration


Rate > 60 mL/min


(>60)


 


Glucose Level


  97 MG/DL


()


 


Calcium Level


  9.6 MG/DL


(8.5-10.1)








Height (Feet):  5


Height (Inches):  7.00


Weight (Pounds):  200


General Appearance:  WD/WN, no apparent distress, alert


Cardiovascular:  normal rate


Respiratory/Chest:  normal breath sounds, no respiratory distress


Abdominal Exam:  normal bowel sounds, non tender, soft


Extremities:  normal range of motion, non-tender











Maggi Montanez N.P. Feb 1, 2018 12:10

## 2018-02-02 NOTE — DISCHARGE SUMMARY
Discharge Summary


Hospital Course


Date of Admission


Jan 19, 2018 at 04:45


Date of Discharge


Feb 1, 2018 at 19:00


Admitting Diagnosis


chemo reaction


HPI


Harvey Varela is a 32 year old female who was admitted on Jan 19, 2018 at 04:

45 for Chemo Reaction


Hospital Course


[id number]





Discharge


Discharge Disposition


Patient was discharged to Home with Home Health(06)


Discharge Diagnoses:  











Blaire Aceves NP Feb 2, 2018 16:18

## 2018-02-03 NOTE — DISCHARGE SUMMARY 2 SIG
DATE OF ADMISSION:  01/19/2018



DATE OF DISCHARGE:  02/01/2018



CONSULTANTS:

1. Fara Bolanos M.D.

2. Scar Marie M.D.

3. Karol Burton M.D.

4. Chrisotph Cason M.D.



BRIEF HOSPITAL COURSE:  The patient is a 32-year-old female with past

medical history significant for right breast CA stage II status post

lumpectomy in October 2017 and currently undergoing chemotherapy through

Port-A-Cath inserted in December 2017, presented to ED complaining of

vomiting, nonbilious and nonbloody vomitus with associated shortness of

breath.  Vomiting started after chemotherapy and had diffuse bony pain.

On evaluation at ED, she was found to have leukocytosis.  Blood work and

tests revealed significant urinary tract infection and elevated WBC

showing concerning for bacteremia and sepsis.  The patient was admitted for

IV hydration and IV antibiotics.  She was initially started on clear

liquid diet and was given symptomatic treatment with Zofran and Phenergan

for nausea.  She had an abdominal ultrasound that showed unremarkable

study.  She was initially started empirically on Zosyn.  Urine toxicology

was positive for marijuana and opiates.  She had a bone scan done that was

negative.  Blood culture was showing growth of Staphylococcus aureus.  She

had drainage coming out from the Port-A-Cath.  On 01/22/2018, she

underwent removal of infected left chest wall venous port and catheter by

Dr. Cason.  The patient will need long-term intravenous antibiotic.

CT showed thrombophlebitis.  Post removal of catheter, she continued to

have leukocytosis and was having fever.  Antibiotic was switched to Ancef.

She had a new PICC line inserted to the left arm.  Repeat blood cultures

were negative.  Daptomycin was added to her regimen.  She showed

improvement and needs to continue intravenous Ancef, needs to be treated

for six weeks from first negative blood culture, which was on 01/26/2018.

Estimated end of therapy would be 03/08/2018.  Urine culture showed mixed

gram-positive growth.  Influenza swabs were negative.  She had a venous

duplex of upper extremity that showed an acute DVT in the internal jugular

and subclavian vein.  She was started on Xarelto.  Leukocytosis resolved.

The patient was eventually discharged home to continue IV antibiotic and

Xarelto.



FINAL DIAGNOSES:

1. Sepsis.

2. Bacteremia with Staph A/line infection, present on admission; with 
persistent infection due

to septic thrombophlebitis 

3. Acute deep vein thrombosis on the left upper extremity.

4. Elevated liver transaminases, resolved.

5. Nausea, vomiting, and diarrhea, probably chemo-induced and related to

sepsis.

6. Breast cancer status post mastectomy and lumpectomy.

7. Urinary tract infection.

8. Anemia secondary to iron deficiency.

9. Pancytopenia secondary to chemotherapy.

10. Dehydration.

11. Status post removal of infected Port-A-Cath.

12. Hypokalemia.

13. Hyponatremia.



DISPOSITION:  The patient was discharged home with home health.



DISCHARGE MEDICATIONS:  Refer to medication list.



DISCHARGE INSTRUCTIONS:  Followup with PCP in a week.







  ______________________________________________

  Bret Valdovinos M.D.



I have been assigned to dictate discharge summary on this account and I

was not involved in the patient's management.



  ______________________________________________

  Blaire Aceves N.P.





DR:  CHERRY

D:  02/02/2018 16:17

T:  02/03/2018 01:21

JOB#:  [id number]

CC:



KOFFI

## 2018-02-06 NOTE — DIAGNOSTIC IMAGING REPORT
--------------- APPROVED REPORT --------------





CPT Code: 47272



Present Symptoms

Comments: Swelling and pain (left arm)

Hx of line



2D Evaluation







LEFT UPPER EXTREMITY: Venous imaging reveals acute thrombus in the internal jugular and 

subclavian veins. The remaining segments are within normal limits. There is no evidence 

of thrombus within the axillary and brachial veins. The basilic vein is also thrombosed 

at the upper arm level. The cephalic vein was within normal limits.

FRANDY Terrazas was notified of abnormal results at 1104

## 2018-05-12 ENCOUNTER — HOSPITAL ENCOUNTER (EMERGENCY)
Dept: HOSPITAL 72 - EMR | Age: 33
Discharge: HOME | End: 2018-05-12
Payer: MEDICAID

## 2018-05-12 VITALS — DIASTOLIC BLOOD PRESSURE: 68 MMHG | SYSTOLIC BLOOD PRESSURE: 120 MMHG

## 2018-05-12 VITALS — BODY MASS INDEX: 31.86 KG/M2 | HEIGHT: 67 IN | WEIGHT: 203 LBS

## 2018-05-12 VITALS — DIASTOLIC BLOOD PRESSURE: 79 MMHG | SYSTOLIC BLOOD PRESSURE: 113 MMHG

## 2018-05-12 DIAGNOSIS — K11.20: Primary | ICD-10-CM

## 2018-05-12 DIAGNOSIS — Z85.3: ICD-10-CM

## 2018-05-12 PROCEDURE — 99283 EMERGENCY DEPT VISIT LOW MDM: CPT

## 2018-05-13 NOTE — EMERGENCY ROOM REPORT
History of Present Illness


General


Chief Complaint:  General Complaint


Source:  Patient





Present Illness


HPI


33-year-old female presents ED with swelling to her jaw 1 day.  Noticed 

yesterday after she was eating.  Denies any pain.  Denies any fevers or chills.

  Denies any neck stiffness.  Patient is concerned that she has a blood clot.  

History of breast cancer and has had blood clots previously in her arm.  Denies 

chest pain or shortness of breath.  No other aggravating relieving factors.  

Denies any other associated symptoms


Allergies:  


Coded Allergies:  


     No Known Allergies (Unverified , 10/12/17)





Patient History


Past Medical History:  other - breast cancer


Past Surgical History:  none


Pertinent Family History:  none


Social History:  Denies: smoking, alcohol use, drug use


Last Menstrual Period:  APRIL 28,2018


Pregnant Now:  No


Immunizations:  UTD


Reviewed Nursing Documentation:  PMH: Agreed; PSxH: Agreed





Nursing Documentation-PMH


Hx Cardiac Problems:  No


Hx Pacemaker:  No


Hx Asthma:  No


Hx COPD:  No


Hx Diabetes:  No


Hx Cancer:  Yes - RT BREAST


Hx Gastrointestinal Problems:  No


Hx Dialysis:  No


Hx Neurological Problems:  No


Hx Cerebrovascular Accident:  No


Hx Seizures:  No





Review of Systems


All Other Systems:  negative except mentioned in HPI





Physical Exam





Vital Signs








  Date Time  Temp Pulse Resp B/P (MAP) Pulse Ox O2 Delivery O2 Flow Rate FiO2


 


5/12/18 20:04 98.3 115 16 135/86 100 Room Air  





 98.2       








Sp02 EP Interpretation:  reviewed, normal


General Appearance:  no apparent distress, alert, GCS 15, non-toxic


Head:  normocephalic, atraumatic


Eyes:  bilateral eye normal inspection, bilateral eye PERRL


ENT:  hearing grossly normal, normal pharynx, no angioedema, normal voice, 

other - swelling to L jaw. no fluctuance. no discharge


Neck:  full range of motion, supple, no meningismus, supple/symm/no masses


Respiratory:  chest non-tender, lungs clear, normal breath sounds, speaking 

full sentences


Cardiovascular #1:  normal inspection


Gastrointestinal:  normal inspection


Rectal:  deferred


Genitourinary:  no CVA tenderness


Musculoskeletal:  normal inspection


Neurologic:  alert, oriented x3, responsive, motor strength/tone normal, 

sensory intact, speech normal


Psychiatric:  normal inspection


Skin:  normal inspection


Lymphatic:  normal inspection





Medical Decision Making


Diagnostic Impression:  


 Primary Impression:  


 Acute sialoadenitis


ER Course


Hospital Course 


33-year-old female presents ED with swelling to left jaw 1 day





Differential diagnoses include: fracture, abscess, cellulitis, dental infection





Clinical course


Patient placed on stretcher.  After initial history, physical exam reveals a 

female in no acute distress.  Bilateral TM unremarkable.  Oropharynx 

unremarkable.  No evidence of dental abscess.  No tooth pain.  There is 

swelling appreciated to the left posterior cheek.  Consistent with 

sialoadenitis.  No nuchal rigidity.  No neck pain.





I discussed findings with patient.  Patient had concerns of blood clot given 

history of breast cancer and previous blood clot.  The swelling is not 

consistent with a DVT.  Since symptoms started after eating sialoadenitis is 

most likely diagnosis.  I offered to perform CT facial bone with contrast for 

reassurance but patient declined





recommendation is antibiotics, lemon candy.  Close follow-up with PMD.  However 

if symptoms were to progressively get worse or change I recommend patient 

return to ED for workup





Diagnosis - acute sialdoadenitis





Stable and discharged home with prescriptions for clindamycin. use lemon candy.

  Instructed to followup with PMD.  return to ED if symptoms recur or worsen





Last Vital Signs








  Date Time  Temp Pulse Resp B/P (MAP) Pulse Ox O2 Delivery O2 Flow Rate FiO2


 


5/12/18 20:43 97.9 65 18 113/79 100 Room Air  





 97.9       








Status:  improved


Disposition:  HOME, SELF-CARE


Condition:  Stable


Scripts


Clindamycin Hcl (CLINDAMYCIN HCL) 300 Mg Capsule


300 MG ORAL THREE TIMES A DAY, #21 CAP


   Prov: Shaka Hernandez MD         5/12/18


Referrals:  


NOT CHOSEN [facility]/MD,REFERRING (PCP)


Patient Instructions:  Parotitis, Easy-to-Read











Shaka Hernandez MD May 13, 2018 14:49